# Patient Record
Sex: FEMALE | Race: WHITE | Employment: PART TIME | URBAN - METROPOLITAN AREA
[De-identification: names, ages, dates, MRNs, and addresses within clinical notes are randomized per-mention and may not be internally consistent; named-entity substitution may affect disease eponyms.]

---

## 2017-01-11 ENCOUNTER — OFFICE VISIT (OUTPATIENT)
Dept: PODIATRY | Age: 57
End: 2017-01-11

## 2017-01-11 VITALS
HEART RATE: 86 BPM | OXYGEN SATURATION: 97 % | DIASTOLIC BLOOD PRESSURE: 80 MMHG | SYSTOLIC BLOOD PRESSURE: 126 MMHG | TEMPERATURE: 97.2 F | HEIGHT: 69 IN

## 2017-01-11 DIAGNOSIS — E11.610 CHARCOT FOOT DUE TO DIABETES MELLITUS (HCC): Primary | ICD-10-CM

## 2017-01-11 PROCEDURE — 99213 OFFICE O/P EST LOW 20 MIN: CPT | Performed by: PODIATRIST

## 2017-02-22 ENCOUNTER — OFFICE VISIT (OUTPATIENT)
Dept: PODIATRY | Age: 57
End: 2017-02-22

## 2017-02-22 VITALS
SYSTOLIC BLOOD PRESSURE: 122 MMHG | RESPIRATION RATE: 14 BRPM | TEMPERATURE: 97.8 F | DIASTOLIC BLOOD PRESSURE: 86 MMHG | HEIGHT: 68 IN | HEART RATE: 77 BPM | OXYGEN SATURATION: 97 %

## 2017-02-22 DIAGNOSIS — E11.610 CHARCOT FOOT DUE TO DIABETES MELLITUS (HCC): Primary | ICD-10-CM

## 2017-02-22 PROCEDURE — 99213 OFFICE O/P EST LOW 20 MIN: CPT | Performed by: PODIATRIST

## 2017-04-05 ENCOUNTER — OFFICE VISIT (OUTPATIENT)
Dept: FAMILY MEDICINE CLINIC | Age: 57
End: 2017-04-05

## 2017-04-05 VITALS
TEMPERATURE: 96.6 F | HEART RATE: 75 BPM | SYSTOLIC BLOOD PRESSURE: 102 MMHG | WEIGHT: 257 LBS | DIASTOLIC BLOOD PRESSURE: 76 MMHG | HEIGHT: 68 IN | BODY MASS INDEX: 38.95 KG/M2 | RESPIRATION RATE: 12 BRPM

## 2017-04-05 DIAGNOSIS — Z79.4 CONTROLLED TYPE 2 DIABETES MELLITUS WITH INSULIN THERAPY (HCC): Primary | ICD-10-CM

## 2017-04-05 DIAGNOSIS — J01.00 ACUTE MAXILLARY SINUSITIS, RECURRENCE NOT SPECIFIED: ICD-10-CM

## 2017-04-05 DIAGNOSIS — H69.82 ETD (EUSTACHIAN TUBE DYSFUNCTION), LEFT: ICD-10-CM

## 2017-04-05 DIAGNOSIS — M32.9 SLE (SYSTEMIC LUPUS ERYTHEMATOSUS) (HCC): ICD-10-CM

## 2017-04-05 DIAGNOSIS — E11.9 CONTROLLED TYPE 2 DIABETES MELLITUS WITH INSULIN THERAPY (HCC): Primary | ICD-10-CM

## 2017-04-05 PROCEDURE — 99213 OFFICE O/P EST LOW 20 MIN: CPT | Performed by: FAMILY MEDICINE

## 2017-04-05 RX ORDER — SULFAMETHOXAZOLE AND TRIMETHOPRIM 800; 160 MG/1; MG/1
1 TABLET ORAL 2 TIMES DAILY
Qty: 20 TABLET | Refills: 0 | Status: SHIPPED | OUTPATIENT
Start: 2017-04-05 | End: 2017-04-15

## 2017-04-07 DIAGNOSIS — E11.9 TYPE II DIABETES MELLITUS (HCC): ICD-10-CM

## 2017-04-16 ASSESSMENT — ENCOUNTER SYMPTOMS
SINUS PRESSURE: 1
COUGH: 1
ABDOMINAL PAIN: 0
SHORTNESS OF BREATH: 0
WHEEZING: 0

## 2017-04-18 ENCOUNTER — OFFICE VISIT (OUTPATIENT)
Dept: FAMILY MEDICINE CLINIC | Age: 57
End: 2017-04-18

## 2017-04-18 VITALS
DIASTOLIC BLOOD PRESSURE: 84 MMHG | BODY MASS INDEX: 37.13 KG/M2 | RESPIRATION RATE: 12 BRPM | HEIGHT: 68 IN | HEART RATE: 75 BPM | WEIGHT: 245 LBS | SYSTOLIC BLOOD PRESSURE: 126 MMHG | TEMPERATURE: 96.7 F

## 2017-04-18 DIAGNOSIS — L01.00 IMPETIGO: Primary | ICD-10-CM

## 2017-04-18 DIAGNOSIS — H65.05 RECURRENT ACUTE SEROUS OTITIS MEDIA OF LEFT EAR: ICD-10-CM

## 2017-04-18 PROCEDURE — 99213 OFFICE O/P EST LOW 20 MIN: CPT | Performed by: FAMILY MEDICINE

## 2017-04-18 RX ORDER — CLINDAMYCIN HYDROCHLORIDE 300 MG/1
300 CAPSULE ORAL 3 TIMES DAILY
Qty: 30 CAPSULE | Refills: 0 | Status: SHIPPED | OUTPATIENT
Start: 2017-04-18 | End: 2017-08-04 | Stop reason: SDUPTHER

## 2017-04-18 RX ORDER — MUPIROCIN CALCIUM 20 MG/G
CREAM TOPICAL
Qty: 15 G | Refills: 1 | Status: SHIPPED | OUTPATIENT
Start: 2017-04-18 | End: 2017-05-18

## 2017-04-24 ENCOUNTER — OFFICE VISIT (OUTPATIENT)
Dept: PODIATRY | Age: 57
End: 2017-04-24

## 2017-04-24 VITALS
HEART RATE: 81 BPM | DIASTOLIC BLOOD PRESSURE: 82 MMHG | HEIGHT: 68 IN | OXYGEN SATURATION: 95 % | BODY MASS INDEX: 38.34 KG/M2 | SYSTOLIC BLOOD PRESSURE: 130 MMHG | WEIGHT: 253 LBS | RESPIRATION RATE: 16 BRPM

## 2017-04-24 DIAGNOSIS — G60.9 IDIOPATHIC PERIPHERAL NEUROPATHY: ICD-10-CM

## 2017-04-24 DIAGNOSIS — E11.610 CHARCOT'S ARTHROPATHY, DIABETIC (HCC): Primary | ICD-10-CM

## 2017-04-24 PROCEDURE — 99213 OFFICE O/P EST LOW 20 MIN: CPT | Performed by: PODIATRIST

## 2017-04-25 RX ORDER — GABAPENTIN 300 MG/1
CAPSULE ORAL
Qty: 90 CAPSULE | Refills: 2 | Status: SHIPPED | OUTPATIENT
Start: 2017-04-25 | End: 2017-06-21 | Stop reason: SDUPTHER

## 2017-04-30 ASSESSMENT — ENCOUNTER SYMPTOMS
RHINORRHEA: 1
ABDOMINAL PAIN: 0
BACK PAIN: 1
SINUS PRESSURE: 1
COUGH: 0
SHORTNESS OF BREATH: 0
SORE THROAT: 0

## 2017-05-10 DIAGNOSIS — E11.9 CONTROLLED TYPE 2 DIABETES MELLITUS WITH INSULIN THERAPY (HCC): ICD-10-CM

## 2017-05-10 DIAGNOSIS — D69.6 THROMBOCYTOPENIA (HCC): Primary | ICD-10-CM

## 2017-05-10 DIAGNOSIS — D70.9 NEUTROPENIA, UNSPECIFIED TYPE (HCC): ICD-10-CM

## 2017-05-10 DIAGNOSIS — Z79.4 CONTROLLED TYPE 2 DIABETES MELLITUS WITH INSULIN THERAPY (HCC): ICD-10-CM

## 2017-05-10 DIAGNOSIS — M32.9 SLE (SYSTEMIC LUPUS ERYTHEMATOSUS) (HCC): ICD-10-CM

## 2017-05-10 LAB
ALBUMIN SERPL-MCNC: 3.9 G/DL (ref 3.9–4.9)
ALP BLD-CCNC: 89 U/L (ref 40–130)
ALT SERPL-CCNC: 16 U/L (ref 0–33)
ANION GAP SERPL CALCULATED.3IONS-SCNC: 16 MEQ/L (ref 7–13)
ANISOCYTOSIS: ABNORMAL
AST SERPL-CCNC: 29 U/L (ref 0–35)
BANDED NEUTROPHILS RELATIVE PERCENT: 5 % (ref 5–11)
BASOPHILS ABSOLUTE: 0 K/UL (ref 0–0.2)
BASOPHILS RELATIVE PERCENT: 1.2 %
BILIRUB SERPL-MCNC: 0.5 MG/DL (ref 0–1.2)
BUN BLDV-MCNC: 12 MG/DL (ref 6–20)
BURR CELLS: ABNORMAL
CALCIUM SERPL-MCNC: 8.8 MG/DL (ref 8.6–10.2)
CHLORIDE BLD-SCNC: 101 MEQ/L (ref 98–107)
CO2: 23 MEQ/L (ref 22–29)
CREAT SERPL-MCNC: 0.9 MG/DL (ref 0.5–0.9)
EOSINOPHILS ABSOLUTE: 0 K/UL (ref 0–0.7)
EOSINOPHILS RELATIVE PERCENT: 2 %
GFR AFRICAN AMERICAN: >60
GFR NON-AFRICAN AMERICAN: >60
GLOBULIN: 2.9 G/DL (ref 2.3–3.5)
GLUCOSE BLD-MCNC: 155 MG/DL (ref 74–109)
HBA1C MFR BLD: 7 % (ref 4.8–5.9)
HCT VFR BLD CALC: 31.5 % (ref 37–47)
HEMOGLOBIN: 10.7 G/DL (ref 12–16)
LDL CHOLESTEROL DIRECT: 76 MG/DL (ref 0–129)
LYMPHOCYTES ABSOLUTE: 0.5 K/UL (ref 1–4.8)
LYMPHOCYTES RELATIVE PERCENT: 23 %
MCH RBC QN AUTO: 28.6 PG (ref 27–31.3)
MCHC RBC AUTO-ENTMCNC: 33.9 % (ref 33–37)
MCV RBC AUTO: 84.1 FL (ref 82–100)
MICROCYTES: ABNORMAL
MONOCYTES ABSOLUTE: 0.2 K/UL (ref 0.2–0.8)
MONOCYTES RELATIVE PERCENT: 10.5 %
NEUTROPHILS ABSOLUTE: 1.4 K/UL (ref 1.4–6.5)
NEUTROPHILS RELATIVE PERCENT: 59 %
PDW BLD-RTO: 15.8 % (ref 11.5–14.5)
PLATELET # BLD: 70 K/UL (ref 130–400)
PLATELET SLIDE REVIEW: ABNORMAL
POIKILOCYTES: ABNORMAL
POTASSIUM SERPL-SCNC: 4.1 MEQ/L (ref 3.5–5.1)
PROMYELOCYTES PERCENT: 1 %
RBC # BLD: 3.74 M/UL (ref 4.2–5.4)
SMUDGE CELLS: 3.8
SODIUM BLD-SCNC: 140 MEQ/L (ref 132–144)
TOTAL PROTEIN: 6.8 G/DL (ref 6.4–8.1)
WBC # BLD: 2.2 K/UL (ref 4.8–10.8)

## 2017-05-17 ENCOUNTER — OFFICE VISIT (OUTPATIENT)
Dept: FAMILY MEDICINE CLINIC | Age: 57
End: 2017-05-17

## 2017-05-17 VITALS
SYSTOLIC BLOOD PRESSURE: 126 MMHG | TEMPERATURE: 96.5 F | HEART RATE: 78 BPM | HEIGHT: 68 IN | RESPIRATION RATE: 16 BRPM | DIASTOLIC BLOOD PRESSURE: 82 MMHG | BODY MASS INDEX: 38.04 KG/M2 | WEIGHT: 251 LBS

## 2017-05-17 DIAGNOSIS — D70.8 OTHER NEUTROPENIA (HCC): ICD-10-CM

## 2017-05-17 DIAGNOSIS — D69.6 THROMBOCYTOPENIA, ACQUIRED (HCC): ICD-10-CM

## 2017-05-17 DIAGNOSIS — J40 SINOBRONCHITIS: ICD-10-CM

## 2017-05-17 DIAGNOSIS — J32.9 SINOBRONCHITIS: ICD-10-CM

## 2017-05-17 DIAGNOSIS — E11.9 CONTROLLED TYPE 2 DIABETES MELLITUS WITHOUT COMPLICATION, WITHOUT LONG-TERM CURRENT USE OF INSULIN (HCC): Primary | ICD-10-CM

## 2017-05-17 PROCEDURE — 99213 OFFICE O/P EST LOW 20 MIN: CPT | Performed by: FAMILY MEDICINE

## 2017-05-17 RX ORDER — AMOXICILLIN 500 MG/1
CAPSULE ORAL
Qty: 40 CAPSULE | Refills: 0 | Status: SHIPPED | OUTPATIENT
Start: 2017-05-17 | End: 2017-06-14

## 2017-05-26 DIAGNOSIS — M32.9 SLE (SYSTEMIC LUPUS ERYTHEMATOSUS) (HCC): ICD-10-CM

## 2017-05-26 DIAGNOSIS — D69.6 THROMBOCYTOPENIA (HCC): ICD-10-CM

## 2017-05-26 DIAGNOSIS — D70.9 NEUTROPENIA, UNSPECIFIED TYPE (HCC): ICD-10-CM

## 2017-05-26 LAB
BASOPHILS ABSOLUTE: 0.1 K/UL (ref 0–0.2)
BASOPHILS RELATIVE PERCENT: 2.1 %
EOSINOPHILS ABSOLUTE: 0.1 K/UL (ref 0–0.7)
EOSINOPHILS RELATIVE PERCENT: 3.1 %
HCT VFR BLD CALC: 35.6 % (ref 37–47)
HEMOGLOBIN: 11.5 G/DL (ref 12–16)
LYMPHOCYTES ABSOLUTE: 0.6 K/UL (ref 1–4.8)
LYMPHOCYTES RELATIVE PERCENT: 23.1 %
MCH RBC QN AUTO: 27.8 PG (ref 27–31.3)
MCHC RBC AUTO-ENTMCNC: 32.3 % (ref 33–37)
MCV RBC AUTO: 86.2 FL (ref 82–100)
MONOCYTES ABSOLUTE: 0.3 K/UL (ref 0.2–0.8)
MONOCYTES RELATIVE PERCENT: 12.2 %
NEUTROPHILS ABSOLUTE: 1.5 K/UL (ref 1.4–6.5)
NEUTROPHILS RELATIVE PERCENT: 59.5 %
PDW BLD-RTO: 16.2 % (ref 11.5–14.5)
PLATELET # BLD: 95 K/UL (ref 130–400)
PLATELET SLIDE REVIEW: ABNORMAL
RBC # BLD: 4.12 M/UL (ref 4.2–5.4)
WBC # BLD: 2.4 K/UL (ref 4.8–10.8)

## 2017-05-27 DIAGNOSIS — D70.8 OTHER NEUTROPENIA (HCC): ICD-10-CM

## 2017-05-27 DIAGNOSIS — D69.6 THROMBOCYTOPENIA (HCC): Primary | ICD-10-CM

## 2017-05-29 ASSESSMENT — ENCOUNTER SYMPTOMS
TROUBLE SWALLOWING: 0
BLOOD IN STOOL: 0
ABDOMINAL PAIN: 0
SORE THROAT: 1
RHINORRHEA: 1
SINUS PRESSURE: 1
BACK PAIN: 1
SHORTNESS OF BREATH: 0
VOICE CHANGE: 0

## 2017-06-14 ENCOUNTER — OFFICE VISIT (OUTPATIENT)
Dept: PODIATRY | Age: 57
End: 2017-06-14

## 2017-06-14 VITALS
RESPIRATION RATE: 14 BRPM | HEIGHT: 68 IN | HEART RATE: 77 BPM | BODY MASS INDEX: 38.49 KG/M2 | WEIGHT: 254 LBS | SYSTOLIC BLOOD PRESSURE: 132 MMHG | TEMPERATURE: 97.6 F | DIASTOLIC BLOOD PRESSURE: 80 MMHG

## 2017-06-14 DIAGNOSIS — E11.610 CHARCOT FOOT DUE TO DIABETES MELLITUS (HCC): Primary | ICD-10-CM

## 2017-06-14 DIAGNOSIS — Z98.890 POST-OPERATIVE STATE: ICD-10-CM

## 2017-06-14 PROCEDURE — 99214 OFFICE O/P EST MOD 30 MIN: CPT | Performed by: PODIATRIST

## 2017-06-21 ENCOUNTER — OFFICE VISIT (OUTPATIENT)
Dept: FAMILY MEDICINE CLINIC | Age: 57
End: 2017-06-21

## 2017-06-21 VITALS
DIASTOLIC BLOOD PRESSURE: 78 MMHG | HEART RATE: 67 BPM | SYSTOLIC BLOOD PRESSURE: 118 MMHG | TEMPERATURE: 96.7 F | HEIGHT: 68 IN | WEIGHT: 251 LBS | RESPIRATION RATE: 16 BRPM | BODY MASS INDEX: 38.04 KG/M2

## 2017-06-21 DIAGNOSIS — E11.42 DM TYPE 2 WITH DIABETIC PERIPHERAL NEUROPATHY (HCC): Primary | ICD-10-CM

## 2017-06-21 DIAGNOSIS — I10 ESSENTIAL HYPERTENSION: ICD-10-CM

## 2017-06-21 DIAGNOSIS — D70.2 OTHER DRUG-INDUCED NEUTROPENIA (HCC): ICD-10-CM

## 2017-06-21 PROCEDURE — 99213 OFFICE O/P EST LOW 20 MIN: CPT | Performed by: FAMILY MEDICINE

## 2017-06-21 RX ORDER — GABAPENTIN 300 MG/1
CAPSULE ORAL
Qty: 90 CAPSULE | Refills: 5 | Status: SHIPPED | OUTPATIENT
Start: 2017-06-21 | End: 2018-01-04 | Stop reason: SDUPTHER

## 2017-06-21 ASSESSMENT — PATIENT HEALTH QUESTIONNAIRE - PHQ9
2. FEELING DOWN, DEPRESSED OR HOPELESS: 0
SUM OF ALL RESPONSES TO PHQ QUESTIONS 1-9: 0
SUM OF ALL RESPONSES TO PHQ9 QUESTIONS 1 & 2: 0
1. LITTLE INTEREST OR PLEASURE IN DOING THINGS: 0

## 2017-07-02 ASSESSMENT — ENCOUNTER SYMPTOMS
ABDOMINAL PAIN: 0
NAUSEA: 0
DIARRHEA: 0
VOMITING: 0
BLOOD IN STOOL: 0
SHORTNESS OF BREATH: 0

## 2017-07-21 DIAGNOSIS — D70.2 OTHER DRUG-INDUCED NEUTROPENIA (HCC): ICD-10-CM

## 2017-07-21 LAB
BASOPHILS ABSOLUTE: 0 K/UL (ref 0–0.2)
BASOPHILS RELATIVE PERCENT: 1.4 %
EOSINOPHILS ABSOLUTE: 0.1 K/UL (ref 0–0.7)
EOSINOPHILS RELATIVE PERCENT: 3.7 %
HCT VFR BLD CALC: 33.3 % (ref 37–47)
HEMOGLOBIN: 11 G/DL (ref 12–16)
LYMPHOCYTES ABSOLUTE: 0.6 K/UL (ref 1–4.8)
LYMPHOCYTES RELATIVE PERCENT: 18.1 %
MCH RBC QN AUTO: 27.3 PG (ref 27–31.3)
MCHC RBC AUTO-ENTMCNC: 32.9 % (ref 33–37)
MCV RBC AUTO: 83 FL (ref 82–100)
MONOCYTES ABSOLUTE: 0.4 K/UL (ref 0.2–0.8)
MONOCYTES RELATIVE PERCENT: 12 %
NEUTROPHILS ABSOLUTE: 2.1 K/UL (ref 1.4–6.5)
NEUTROPHILS RELATIVE PERCENT: 64.8 %
PDW BLD-RTO: 16.9 % (ref 11.5–14.5)
PLATELET # BLD: 92 K/UL (ref 130–400)
PLATELET SLIDE REVIEW: ABNORMAL
RBC # BLD: 4.02 M/UL (ref 4.2–5.4)
RBC # BLD: NORMAL 10*6/UL
WBC # BLD: 3.3 K/UL (ref 4.8–10.8)

## 2017-08-02 ENCOUNTER — OFFICE VISIT (OUTPATIENT)
Dept: PODIATRY | Age: 57
End: 2017-08-02

## 2017-08-02 VITALS
SYSTOLIC BLOOD PRESSURE: 130 MMHG | DIASTOLIC BLOOD PRESSURE: 80 MMHG | HEIGHT: 68 IN | RESPIRATION RATE: 16 BRPM | HEART RATE: 77 BPM | WEIGHT: 251 LBS | BODY MASS INDEX: 38.04 KG/M2

## 2017-08-02 DIAGNOSIS — M25.474 SWELLING OF FOOT JOINT, RIGHT: Primary | ICD-10-CM

## 2017-08-02 DIAGNOSIS — E11.610 CHARCOT FOOT DUE TO DIABETES MELLITUS (HCC): ICD-10-CM

## 2017-08-02 PROCEDURE — 99214 OFFICE O/P EST MOD 30 MIN: CPT | Performed by: PODIATRIST

## 2017-08-04 ENCOUNTER — OFFICE VISIT (OUTPATIENT)
Dept: FAMILY MEDICINE CLINIC | Age: 57
End: 2017-08-04

## 2017-08-04 VITALS
RESPIRATION RATE: 14 BRPM | HEART RATE: 70 BPM | SYSTOLIC BLOOD PRESSURE: 116 MMHG | BODY MASS INDEX: 38.19 KG/M2 | WEIGHT: 252 LBS | HEIGHT: 68 IN | DIASTOLIC BLOOD PRESSURE: 80 MMHG | TEMPERATURE: 97 F

## 2017-08-04 DIAGNOSIS — D70.8 OTHER NEUTROPENIA (HCC): ICD-10-CM

## 2017-08-04 DIAGNOSIS — H65.05 RECURRENT ACUTE SEROUS OTITIS MEDIA OF LEFT EAR: ICD-10-CM

## 2017-08-04 DIAGNOSIS — L01.00 IMPETIGO: ICD-10-CM

## 2017-08-04 PROCEDURE — 99213 OFFICE O/P EST LOW 20 MIN: CPT | Performed by: FAMILY MEDICINE

## 2017-08-04 RX ORDER — CLINDAMYCIN HYDROCHLORIDE 300 MG/1
300 CAPSULE ORAL 3 TIMES DAILY
Qty: 30 CAPSULE | Refills: 0 | Status: SHIPPED | OUTPATIENT
Start: 2017-08-04 | End: 2017-08-14

## 2017-08-13 ASSESSMENT — ENCOUNTER SYMPTOMS
BLOOD IN STOOL: 0
SHORTNESS OF BREATH: 0
COUGH: 0
ABDOMINAL PAIN: 0
CHEST TIGHTNESS: 0

## 2017-08-25 ENCOUNTER — OFFICE VISIT (OUTPATIENT)
Dept: PODIATRY | Age: 57
End: 2017-08-25

## 2017-08-25 VITALS
BODY MASS INDEX: 36.73 KG/M2 | HEIGHT: 69 IN | WEIGHT: 248 LBS | RESPIRATION RATE: 14 BRPM | DIASTOLIC BLOOD PRESSURE: 78 MMHG | TEMPERATURE: 97.8 F | SYSTOLIC BLOOD PRESSURE: 118 MMHG | HEART RATE: 82 BPM

## 2017-08-25 DIAGNOSIS — M14.672 CHARCOT'S JOINT OF LEFT FOOT: ICD-10-CM

## 2017-08-25 DIAGNOSIS — M79.671 RIGHT FOOT PAIN: Primary | ICD-10-CM

## 2017-08-25 DIAGNOSIS — M14.671 CHARCOT'S JOINT OF RIGHT FOOT: ICD-10-CM

## 2017-08-25 PROCEDURE — 99213 OFFICE O/P EST LOW 20 MIN: CPT | Performed by: PODIATRIST

## 2017-08-27 DIAGNOSIS — E11.42 TYPE 2 DIABETES, CONTROLLED, WITH PERIPHERAL NEUROPATHY (HCC): ICD-10-CM

## 2017-08-27 RX ORDER — INSULIN GLARGINE 100 [IU]/ML
INJECTION, SOLUTION SUBCUTANEOUS
Qty: 5 PEN | Refills: 5 | Status: SHIPPED | OUTPATIENT
Start: 2017-08-27 | End: 2017-11-04 | Stop reason: ALTCHOICE

## 2017-09-13 ENCOUNTER — OFFICE VISIT (OUTPATIENT)
Dept: PODIATRY | Age: 57
End: 2017-09-13

## 2017-09-13 VITALS
RESPIRATION RATE: 14 BRPM | TEMPERATURE: 96.9 F | DIASTOLIC BLOOD PRESSURE: 70 MMHG | SYSTOLIC BLOOD PRESSURE: 118 MMHG | HEIGHT: 69 IN | HEART RATE: 78 BPM

## 2017-09-13 DIAGNOSIS — E11.610 CHARCOT FOOT DUE TO DIABETES MELLITUS (HCC): Primary | ICD-10-CM

## 2017-09-13 PROCEDURE — 99213 OFFICE O/P EST LOW 20 MIN: CPT | Performed by: PODIATRIST

## 2017-10-02 ENCOUNTER — OFFICE VISIT (OUTPATIENT)
Dept: FAMILY MEDICINE CLINIC | Age: 57
End: 2017-10-02

## 2017-10-02 VITALS
HEART RATE: 81 BPM | SYSTOLIC BLOOD PRESSURE: 128 MMHG | DIASTOLIC BLOOD PRESSURE: 82 MMHG | HEIGHT: 69 IN | RESPIRATION RATE: 14 BRPM | BODY MASS INDEX: 37.03 KG/M2 | WEIGHT: 250 LBS | TEMPERATURE: 96.7 F

## 2017-10-02 DIAGNOSIS — E11.9 TYPE 2 DIABETES MELLITUS WITHOUT COMPLICATION, WITH LONG-TERM CURRENT USE OF INSULIN (HCC): ICD-10-CM

## 2017-10-02 DIAGNOSIS — Z79.4 TYPE 2 DIABETES MELLITUS WITHOUT COMPLICATION, WITH LONG-TERM CURRENT USE OF INSULIN (HCC): ICD-10-CM

## 2017-10-02 DIAGNOSIS — L08.9 INFECTED SEBACEOUS CYST: ICD-10-CM

## 2017-10-02 DIAGNOSIS — Z23 NEED FOR INFLUENZA VACCINATION: ICD-10-CM

## 2017-10-02 DIAGNOSIS — L72.3 INFECTED SEBACEOUS CYST: Primary | ICD-10-CM

## 2017-10-02 DIAGNOSIS — L72.3 INFECTED SEBACEOUS CYST: ICD-10-CM

## 2017-10-02 DIAGNOSIS — L08.9 INFECTED SEBACEOUS CYST: Primary | ICD-10-CM

## 2017-10-02 PROCEDURE — 99213 OFFICE O/P EST LOW 20 MIN: CPT | Performed by: FAMILY MEDICINE

## 2017-10-02 PROCEDURE — 90471 IMMUNIZATION ADMIN: CPT | Performed by: FAMILY MEDICINE

## 2017-10-02 PROCEDURE — 90688 IIV4 VACCINE SPLT 0.5 ML IM: CPT | Performed by: FAMILY MEDICINE

## 2017-10-02 RX ORDER — SULFAMETHOXAZOLE AND TRIMETHOPRIM 800; 160 MG/1; MG/1
TABLET ORAL
Qty: 30 TABLET | Refills: 0 | Status: SHIPPED | OUTPATIENT
Start: 2017-10-02 | End: 2017-11-04 | Stop reason: ALTCHOICE

## 2017-10-02 NOTE — PROGRESS NOTES
Subjective  Remedios Najera, 65 y.o. female presents today with:  Chief Complaint   Patient presents with    Breast Mass     on right breast area that busted a few weeks ago       HPI  Patient presents today for a boil on right breast area that busted a few weeks ago. Had earlier smaller cyst which improved without rxs;now draining cyst inferior right breast;no fever/chills  Rev/rxs; No abuse nor side effects on meds   ; No cardio-pulmonary probs;compliant with diet/rxs;no new gi-gu complaints   Vaccine Information Sheet, \"Influenza - Inactivated\" OR \"Live - Intranasal\"  given to Remedios Najera. Patient responses:    Have you ever had a reaction to a flu vaccine? No  Are you able to eat eggs without adverse effects? Yes  Do you have any current illness? No  Have you ever had Guillian [place] Syndrome? No    Flu vaccine given per order. Please see immunization tab. Review of Systems   Constitutional: Negative for fatigue and fever. Respiratory: Negative for chest tightness and shortness of breath. Cardiovascular: Positive for chest pain (right breast). Negative for palpitations and leg swelling. Gastrointestinal: Negative for abdominal pain and blood in stool. Genitourinary: Negative for dysuria, flank pain and frequency. Musculoskeletal: Positive for arthralgias and back pain. Skin: Positive for rash. Neurological: Negative for weakness, light-headedness and headaches. Allergies   Allergen Reactions    Biaxin [Clarithromycin]      gi    Cephalexin      vomiting    Codeine Itching       Past Medical History:   Diagnosis Date    DJD (degenerative joint disease)     Dyslipidemia     Hypertension     Sensory neuropathy ([address]     [facility] lupus [facility]matosus [facility]     Type II diabetes mellitus ([facility] [facility]      Past Surgical History:   Procedure Laterality Date    CHOLECYSTECTOMY  2008      [id number] [facility]    RIGHT HAND    HEMORRHOID SURGERY  1982    OTHER SURGICAL HISTORY process tenderness and no muscular tenderness present. Carotid bruit is not present. No tracheal deviation present. No thyroid mass and no thyromegaly present. Cardiovascular: Normal rate, regular rhythm, S1 normal and S2 normal.   No extrasystoles are present. Exam reveals no S3 and no distant heart sounds. Pulmonary/Chest: She has no wheezes. She has no rhonchi. She has no rales. She exhibits tenderness. Right breast exhibits mass (2.5x2cm opened cyst;chaperoned/KS), skin change and tenderness. Skin: Rash (see above diagram) noted. Rash is pustular (6 o'clock on right breast). She is not diaphoretic. There is erythema. Psychiatric: Mood, memory and affect normal.          ;  Assessment & Plan   1. Infected sebaceous cyst,right breast  Wound Culture    sulfamethoxazole-trimethoprim (BACTRIM DS;SEPTRA DS) 800-160 MG per tablet   2. Need for influenza vaccination  INFLUENZA, QUADV, 3 YRS AND OLDER, IM, MDV, 0.5ML (FLUZONE QUADV)   3. Type 2 diabetes mellitus without complication, with long-term current use of insulin ([facility])  Microalbumin / creatinine urine ratio    insulin glargine (BASAGLAR KWIKPEN) 100 UNIT/ML injection pen   wound culture done;4x4 gauze applied;start bds and atif call;keli if worse  ;Continue same meds, as common side effects and use reviewed-call if ineffective or problems ;   Outpatient Encounter Prescriptions as of 10/2/2017   Medication Sig Dispense Refill    insulin glargine (BASAGLAR KWIKPEN) 100 UNIT/ML injection pen Inject 50 Units into the skin every morning (before breakfast) 5 Pen 5    metFORMIN (GLUCOPHAGE) 1000 MG tablet TAKE ONE TABLET BY MOUTH TWICE DAILY WITH MEALS 60 tablet 5    gabapentin (NEURONTIN) 300 MG capsule TAKE ONE CAPSULE BY MOUTH THREE TIMES DAILY 90 capsule 5    glipiZIDE (GLUCOTROL) 10 MG tablet TAKE ONE TABLET BY MOUTH THREE TIMES DAILY BEFORE  MEALS 90 tablet 5    leflunomide (ARAVA) 20 MG tablet       hydroxychloroquine (PLAQUENIL) 200 MG tablet

## 2017-10-02 NOTE — MR AVS SNAPSHOT
Additional Information about your Body Mass Index (BMI)           Your BMI as listed above is considered obese (30 or more). BMI is an estimate of body fat, calculated from your height and weight. The higher your BMI, the greater your risk of heart disease, high blood pressure, type 2 diabetes, stroke, gallstones, arthritis, sleep apnea, and certain cancers. BMI is not perfect. It may overestimate body fat in athletes and people who are more muscular. Even a small weight loss (between 5 and 10 percent of your current weight) by decreasing your calorie intake and becoming more physically active will help lower your risk of developing or worsening diseases associated with obesity. Learn more at: [email or web address]             Today's Medication Changes          These changes are accurate as of: 10/2/17 10:21 AM.  If you have any questions, ask your nurse or doctor. START taking these medications           sulfamethoxazole-trimethoprim 800-160 MG per tablet   Commonly known as:  BACTRIM DS;SEPTRA DS   Instructions:   Take 2 tabs stat, then 1 bid   Quantity:  30 tablet   Refills:  0   Started by:  Aicha Novak, DO            Where to Get Your Medications      These medications were sent to [address] [place] [address] [phone]  [address] [place] [address]     Phone:  [phone]     sulfamethoxazole-trimethoprim 800-160 MG per tablet               Your Current Medications Are              sulfamethoxazole-trimethoprim (BACTRIM DS;SEPTRA DS) 800-160 MG per tablet Take 2 tabs stat, then 1 bid    metFORMIN (GLUCOPHAGE) 1000 MG tablet TAKE ONE TABLET BY MOUTH TWICE DAILY WITH MEALS    gabapentin (NEURONTIN) 300 MG capsule TAKE ONE CAPSULE BY MOUTH THREE TIMES DAILY    glipiZIDE (GLUCOTROL) 10 MG tablet TAKE ONE TABLET BY MOUTH THREE TIMES DAILY BEFORE  MEALS

## 2017-10-03 LAB
CREATININE URINE: 52 MG/DL
MICROALBUMIN UR-MCNC: 1.8 MG/DL
MICROALBUMIN/CREAT UR-RTO: 34.6 MG/G (ref 0–30)

## 2017-10-04 ENCOUNTER — TELEPHONE (OUTPATIENT)
Dept: FAMILY MEDICINE CLINIC | Age: 57
End: 2017-10-04

## 2017-10-05 DIAGNOSIS — N61.0 CELLULITIS OF RIGHT BREAST: Primary | ICD-10-CM

## 2017-10-05 LAB
GRAM STAIN RESULT: ABNORMAL
ORGANISM: ABNORMAL
ORGANISM: ABNORMAL
WOUND/ABSCESS: ABNORMAL

## 2017-10-05 RX ORDER — MUPIROCIN CALCIUM 20 MG/G
CREAM TOPICAL
Qty: 15 G | Refills: 1 | Status: SHIPPED | OUTPATIENT
Start: 2017-10-05 | End: 2017-11-04

## 2017-10-06 ENCOUNTER — TELEPHONE (OUTPATIENT)
Dept: FAMILY MEDICINE CLINIC | Age: 57
End: 2017-10-06

## 2017-10-09 ASSESSMENT — ENCOUNTER SYMPTOMS
BLOOD IN STOOL: 0
BACK PAIN: 1
CHEST TIGHTNESS: 0
ABDOMINAL PAIN: 0
SHORTNESS OF BREATH: 0

## 2017-10-17 DIAGNOSIS — N61.1 ABSCESS OF RIGHT BREAST: Primary | ICD-10-CM

## 2017-10-17 RX ORDER — AMOXICILLIN AND CLAVULANATE POTASSIUM 875; 125 MG/1; MG/1
1 TABLET, FILM COATED ORAL 2 TIMES DAILY
Qty: 20 TABLET | Refills: 0 | Status: SHIPPED | OUTPATIENT
Start: 2017-10-17 | End: 2018-03-15 | Stop reason: SDUPTHER

## 2017-10-27 DIAGNOSIS — D70.8 OTHER NEUTROPENIA (HCC): ICD-10-CM

## 2017-10-27 LAB
ANION GAP SERPL CALCULATED.3IONS-SCNC: 15 MEQ/L (ref 7–13)
BASOPHILS ABSOLUTE: 0 K/UL (ref 0–0.2)
BASOPHILS RELATIVE PERCENT: 1.7 %
BUN BLDV-MCNC: 11 MG/DL (ref 6–20)
CALCIUM SERPL-MCNC: 9.2 MG/DL (ref 8.6–10.2)
CHLORIDE BLD-SCNC: 100 MEQ/L (ref 98–107)
CO2: 25 MEQ/L (ref 22–29)
CREAT SERPL-MCNC: 0.76 MG/DL (ref 0.5–0.9)
EOSINOPHILS ABSOLUTE: 0 K/UL (ref 0–0.7)
EOSINOPHILS RELATIVE PERCENT: 2.2 %
GFR AFRICAN AMERICAN: >60
GFR NON-AFRICAN AMERICAN: >60
GLUCOSE BLD-MCNC: 134 MG/DL (ref 74–109)
HBA1C MFR BLD: 7.2 % (ref 4.8–5.9)
HCT VFR BLD CALC: 34.2 % (ref 37–47)
HEMOGLOBIN: 11.4 G/DL (ref 12–16)
LYMPHOCYTES ABSOLUTE: 0.7 K/UL (ref 1–4.8)
LYMPHOCYTES RELATIVE PERCENT: 30.8 %
MCH RBC QN AUTO: 27.3 PG (ref 27–31.3)
MCHC RBC AUTO-ENTMCNC: 33.4 % (ref 33–37)
MCV RBC AUTO: 81.8 FL (ref 82–100)
MONOCYTES ABSOLUTE: 0.2 K/UL (ref 0.2–0.8)
MONOCYTES RELATIVE PERCENT: 10.6 %
NEUTROPHILS ABSOLUTE: 1.2 K/UL (ref 1.4–6.5)
NEUTROPHILS RELATIVE PERCENT: 54.7 %
PDW BLD-RTO: 17.5 % (ref 11.5–14.5)
PLATELET # BLD: 56 K/UL (ref 130–400)
PLATELET SLIDE REVIEW: ABNORMAL
POTASSIUM SERPL-SCNC: 4.4 MEQ/L (ref 3.5–5.1)
RBC # BLD: 4.18 M/UL (ref 4.2–5.4)
SLIDE REVIEW: ABNORMAL
SODIUM BLD-SCNC: 140 MEQ/L (ref 132–144)
WBC # BLD: 2.2 K/UL (ref 4.8–10.8)

## 2017-11-04 ENCOUNTER — OFFICE VISIT (OUTPATIENT)
Dept: FAMILY MEDICINE CLINIC | Age: 57
End: 2017-11-04

## 2017-11-04 VITALS
WEIGHT: 245 LBS | SYSTOLIC BLOOD PRESSURE: 128 MMHG | DIASTOLIC BLOOD PRESSURE: 86 MMHG | RESPIRATION RATE: 14 BRPM | HEART RATE: 69 BPM | TEMPERATURE: 97.1 F | HEIGHT: 69 IN | BODY MASS INDEX: 36.29 KG/M2

## 2017-11-04 DIAGNOSIS — M32.8 OTHER FORMS OF SYSTEMIC LUPUS ERYTHEMATOSUS, UNSPECIFIED ORGAN INVOLVEMENT STATUS (HCC): ICD-10-CM

## 2017-11-04 DIAGNOSIS — D69.59 DRUG-INDUCED IMMUNE THROMBOCYTOPENIA: ICD-10-CM

## 2017-11-04 DIAGNOSIS — T50.905A DRUG-INDUCED IMMUNE THROMBOCYTOPENIA: ICD-10-CM

## 2017-11-04 DIAGNOSIS — D70.2 DRUG-INDUCED LEUKOPENIA (HCC): Primary | ICD-10-CM

## 2017-11-04 PROCEDURE — G8484 FLU IMMUNIZE NO ADMIN: HCPCS | Performed by: FAMILY MEDICINE

## 2017-11-04 PROCEDURE — G8417 CALC BMI ABV UP PARAM F/U: HCPCS | Performed by: FAMILY MEDICINE

## 2017-11-04 PROCEDURE — 3014F SCREEN MAMMO DOC REV: CPT | Performed by: FAMILY MEDICINE

## 2017-11-04 PROCEDURE — 99213 OFFICE O/P EST LOW 20 MIN: CPT | Performed by: FAMILY MEDICINE

## 2017-11-04 PROCEDURE — G8427 DOCREV CUR MEDS BY ELIG CLIN: HCPCS | Performed by: FAMILY MEDICINE

## 2017-11-04 PROCEDURE — 1036F TOBACCO NON-USER: CPT | Performed by: FAMILY MEDICINE

## 2017-11-04 PROCEDURE — 3017F COLORECTAL CA SCREEN DOC REV: CPT | Performed by: FAMILY MEDICINE

## 2017-11-04 PROCEDURE — 3045F PR MOST RECENT HEMOGLOBIN A1C LEVEL 7.0-9.0%: CPT | Performed by: FAMILY MEDICINE

## 2017-11-04 NOTE — PROGRESS NOTES
Subjective  Madi Santiago, 65 y.o. female presents today with:  Chief Complaint   Patient presents with    Cyst     3 mnth f/u     Diabetes       HPI  Patient presents today for a three month follow up for cyst and diabetes. Pt aware of lower wbc and rheum d/c the Arava[still on plaquenil]  Joints--stable and NO rash/bleeding  Am sugars/100-145  ; No cardio-pulmonary probs;compliant with diet/rxs;no new gi-gu complaints   Review of Systems   Constitutional: Positive for fatigue. Negative for fever and unexpected weight change. Eyes: Negative for visual disturbance. Respiratory: Negative for shortness of breath and wheezing. Cardiovascular: Negative for chest pain, palpitations and leg swelling. Gastrointestinal: Negative for abdominal pain and blood in stool. Genitourinary: Negative for dysuria, flank pain and frequency. Musculoskeletal: Positive for arthralgias and myalgias. Negative for gait problem. Skin: Negative for rash. Neurological: Positive for numbness (feet). Negative for weakness and light-headedness. Psychiatric/Behavioral: Negative for confusion. right lower chest cyst doing well after bds/augmentin    Allergies   Allergen Reactions    Biaxin [Clarithromycin]      gi    Codeine Itching    Cephalexin Nausea And Vomiting       Past Medical History:   Diagnosis Date    DJD (degenerative joint disease)     Dyslipidemia     Hypertension     Sensory neuropathy ([address]     Systemic lupus erythematosus ([address]     Type II diabetes mellitus ([address]      Past Surgical History:   Procedure Laterality Date    CHOLECYSTECTOMY  2008    DR. TAVERA    CYST REMOVAL  1995    RIGHT HAND    HEMORRHOID SURGERY  1982    OTHER SURGICAL HISTORY Left 09/01/2016    CARMELITA WEBBER DPM  ARTHRODESIS TALONAVICULAR NAVICULAR CUNEIFORM 1ST METATARSAL CUNEIFORM BONE GRAFT    TUBAL LIGATION  1988    TUMOR REMOVAL  1984    LEFT LEG     Social History     Social History    Marital status:      Spouse Musculoskeletal: She exhibits no edema. Left knee: Tenderness found. Medial joint line (tr/4 tx) tenderness noted. Neurological: A sensory deficit (reduced touch/vib in feet x2) is present. Skin: Rash noted. Rash is nodular (cyst .wo redness/drainage). She is not diaphoretic. Psychiatric: Mood, memory and affect normal.     Orders Only on 10/27/2017   Component Date Value Ref Range Status    Hemoglobin A1C 10/27/2017 7.2* 4.8 - 5.9 % Final    Sodium 10/27/2017 140  132 - 144 mEq/L Final    Potassium 10/27/2017 4.4  3.5 - 5.1 mEq/L Final    Chloride 10/27/2017 100  98 - 107 mEq/L Final    CO2 10/27/2017 25  22 - 29 mEq/L Final    Anion Gap 10/27/2017 15* 7 - 13 mEq/L Final    Glucose 10/27/2017 134* 74 - 109 mg/dL Final    BUN 10/27/2017 11  6 - 20 mg/dL Final    CREATININE 10/27/2017 0.76  0.50 - 0.90 mg/dL Final    GFR Non- 10/27/2017 >60.0  >60 Final    Comment: >60 mL/min/1.73m2 EGFR, calc. for ages 25 and older using the  MDRD formula (not corrected for weight), is valid for stable  renal function.  GFR  10/27/2017 >60.0  >60 Final    Comment: >60 mL/min/1.73m2 EGFR, calc. for ages 25 and older using the  MDRD formula (not corrected for weight), is valid for stable  renal function.       Calcium 10/27/2017 9.2  8.6 - 10.2 mg/dL Final    WBC 10/27/2017 2.2* 4.8 - 10.8 K/uL Final    RBC 10/27/2017 4.18* 4.20 - 5.40 M/uL Final    Hemoglobin 10/27/2017 11.4* 12.0 - 16.0 g/dL Final    Hematocrit 10/27/2017 34.2* 37.0 - 47.0 % Final    MCV 10/27/2017 81.8* 82.0 - 100.0 fL Final    MCH 10/27/2017 27.3  27.0 - 31.3 pg Final    MCHC 10/27/2017 33.4  33.0 - 37.0 % Final    RDW 10/27/2017 17.5* 11.5 - 14.5 % Final    Platelets 44/81/0650 56* 130 - 400 K/uL Final    PLATELET SLIDE REVIEW 10/27/2017 Decreased   Final    SLIDE REVIEW 10/27/2017 see below   Final    Neutrophils % 10/27/2017 54.7  % Final    Lymphocytes % 10/27/2017 30.8  % Final  Monocytes % 10/27/2017 10.6  % Final    Eosinophils % 10/27/2017 2.2  % Final    Basophils % 10/27/2017 1.7  % Final    Neutrophils # 10/27/2017 1.2* 1.4 - 6.5 K/uL Final    Lymphocytes # 10/27/2017 0.7* 1.0 - 4.8 K/uL Final    Monocytes # 10/27/2017 0.2  0.2 - 0.8 K/uL Final    Eosinophils # 10/27/2017 0.0  0.0 - 0.7 K/uL Final    Basophils # 10/27/2017 0.0  0.0 - 0.2 K/uL Final     WOUND/ABSCESS 10/02/2017  9:37 PM [address] [facility]   Gram Stain Result  (Abnormal) 10/02/2017  9:37 PM [address] [facility]    (A)   Rare WBC's   No epithelial cells   Moderate Gram positive cocci in clusters-resembling Staph     Organism  (Abnormal) 10/02/2017  9:37 PM [address] [facility]   BHS Group B (Strep agalacticae)     WOUND/ABSCESS 10/02/2017  9:37 PM [address] [facility]   Moderate growth   No further workup   Susceptibility testing of penicillin and other beta-lactams is   not necessary for beta hemolytic Streptococci since resistant   strains have not been identified. (CLSI [id number], 2017)     Organism  (Abnormal) 10/02/2017  9:37 PM [address] [facility]   Staphylococcus coagulase-negative     WOUND/ABSCESS 10/02/2017  9:37 PM [address] [facility]   Heavy growth   No further workup     Testing Performed By     Lab - Abbreviation   ;rev lab  Assessment & Plan   1. Drug-induced leukopenia (HCC)  CBC Auto Differential   2. Other forms of systemic lupus erythematosus, unspecified organ involvement status (HCC)  C-Reactive Protein   3. Uncontrolled type 2 diabetes mellitus with diabetic cataract, without long-term current use of insulin ([facility]     4.  Drug-induced immune thrombocytopenia     f/u cbc in 4-6 wks OFF arava  Up pm lantus to 40-50  Orders Placed This Encounter   Procedures    CBC Auto Differential     Standing Status:   Future     Standing Expiration Date:   11/4/2018    C-Reactive Protein     Standing Status:   Future     Standing Expiration Date:   11/4/2018     No orders of the defined types were placed in this encounter. Medications Discontinued During This Encounter   Medication Reason    sulfamethoxazole-trimethoprim (BACTRIM DS;SEPTRA DS) 800-160 MG per tablet Therapy completed    leflunomide (ARAVA) 20 MG tablet Therapy completed    LANTUS SOLOSTAR 100 UNIT/ML injection pen Therapy completed     Return in about 2 months (around 2018) for dm. ;See above orders, as use and side effects reviewed ; Outpatient Encounter Prescriptions as of 2017   Medication Sig Dispense Refill    [] mupirocin (BACTROBAN) 2 % cream Apply topically 3 times daily. 15 g 1    insulin glargine (BASAGLAR KWIKPEN) 100 UNIT/ML injection pen Inject 50 Units into the skin every morning (before breakfast) 5 Pen 5    metFORMIN (GLUCOPHAGE) 1000 MG tablet TAKE ONE TABLET BY MOUTH TWICE DAILY WITH MEALS 60 tablet 5    gabapentin (NEURONTIN) 300 MG capsule TAKE ONE CAPSULE BY MOUTH THREE TIMES DAILY 90 capsule 5    [DISCONTINUED] glipiZIDE (GLUCOTROL) 10 MG tablet TAKE ONE TABLET BY MOUTH THREE TIMES DAILY BEFORE  MEALS 90 tablet 5    hydroxychloroquine (PLAQUENIL) 200 MG tablet       Cholecalciferol (VITAMIN D3) 47142 UNITS CAPS Take 1 capsule by mouth once a week      [DISCONTINUED] sulfamethoxazole-trimethoprim (BACTRIM DS;SEPTRA DS) 800-160 MG per tablet Take 2 tabs stat, then 1 bid 30 tablet 0    [DISCONTINUED] LANTUS SOLOSTAR 100 UNIT/ML injection pen INJECT 38 UNITS SUBCUTANEOUSLY ONCE DAILY 5 Pen 5    [DISCONTINUED] leflunomide (ARAVA) 20 MG tablet        No facility-administered encounter medications on file as of 2017. Call or return to clinic prn if these symptoms worsen or fail to improve as anticipated.       Janice Jang,

## 2017-11-09 DIAGNOSIS — E11.9 TYPE II DIABETES MELLITUS (HCC): ICD-10-CM

## 2017-11-09 RX ORDER — GLIPIZIDE 10 MG/1
TABLET ORAL
Qty: 90 TABLET | Refills: 5 | Status: SHIPPED | OUTPATIENT
Start: 2017-11-09 | End: 2018-03-12 | Stop reason: SDUPTHER

## 2017-11-12 ASSESSMENT — ENCOUNTER SYMPTOMS
BLOOD IN STOOL: 0
WHEEZING: 0
ABDOMINAL PAIN: 0
SHORTNESS OF BREATH: 0

## 2017-11-20 ENCOUNTER — OFFICE VISIT (OUTPATIENT)
Dept: PODIATRY | Age: 57
End: 2017-11-20

## 2017-11-20 VITALS
WEIGHT: 250 LBS | OXYGEN SATURATION: 97 % | SYSTOLIC BLOOD PRESSURE: 120 MMHG | HEART RATE: 74 BPM | TEMPERATURE: 97.4 F | HEIGHT: 69 IN | RESPIRATION RATE: 14 BRPM | BODY MASS INDEX: 37.03 KG/M2 | DIASTOLIC BLOOD PRESSURE: 78 MMHG

## 2017-11-20 DIAGNOSIS — E11.610 CHARCOT FOOT DUE TO DIABETES MELLITUS (HCC): Primary | ICD-10-CM

## 2017-11-20 PROCEDURE — 3045F PR MOST RECENT HEMOGLOBIN A1C LEVEL 7.0-9.0%: CPT | Performed by: PODIATRIST

## 2017-11-20 PROCEDURE — G8427 DOCREV CUR MEDS BY ELIG CLIN: HCPCS | Performed by: PODIATRIST

## 2017-11-20 PROCEDURE — 1036F TOBACCO NON-USER: CPT | Performed by: PODIATRIST

## 2017-11-20 PROCEDURE — G8417 CALC BMI ABV UP PARAM F/U: HCPCS | Performed by: PODIATRIST

## 2017-11-20 PROCEDURE — 3017F COLORECTAL CA SCREEN DOC REV: CPT | Performed by: PODIATRIST

## 2017-11-20 PROCEDURE — 99213 OFFICE O/P EST LOW 20 MIN: CPT | Performed by: PODIATRIST

## 2017-11-20 PROCEDURE — G8484 FLU IMMUNIZE NO ADMIN: HCPCS | Performed by: PODIATRIST

## 2017-11-20 PROCEDURE — 3014F SCREEN MAMMO DOC REV: CPT | Performed by: PODIATRIST

## 2017-11-24 NOTE — PROGRESS NOTES
Statement of Resident/Student Supervision:  The resident/student was under my direct supervision during today's patient encounter. I am in agreement with the medical chart documentation, as this is a reflection of my personal examination, assessment and treatment plan. Additionally, I was present with the resident/student during the entire history and exam and documented the patient's assessment and treatment plan. I discussed the management with the resident. Ryan Bowman

## 2017-12-15 DIAGNOSIS — M32.8 OTHER FORMS OF SYSTEMIC LUPUS ERYTHEMATOSUS, UNSPECIFIED ORGAN INVOLVEMENT STATUS (HCC): ICD-10-CM

## 2017-12-15 DIAGNOSIS — D70.2 DRUG-INDUCED LEUKOPENIA (HCC): ICD-10-CM

## 2017-12-15 LAB
BASOPHILS ABSOLUTE: 0 K/UL (ref 0–0.2)
BASOPHILS RELATIVE PERCENT: 1 %
C-REACTIVE PROTEIN: 2 MG/L (ref 0–5)
EOSINOPHILS ABSOLUTE: 0.1 K/UL (ref 0–0.7)
EOSINOPHILS RELATIVE PERCENT: 2.7 %
HCT VFR BLD CALC: 35.8 % (ref 37–47)
HEMOGLOBIN: 11.8 G/DL (ref 12–16)
LYMPHOCYTES ABSOLUTE: 0.7 K/UL (ref 1–4.8)
LYMPHOCYTES RELATIVE PERCENT: 28.8 %
MCH RBC QN AUTO: 28.5 PG (ref 27–31.3)
MCHC RBC AUTO-ENTMCNC: 33 % (ref 33–37)
MCV RBC AUTO: 86.2 FL (ref 82–100)
MONOCYTES ABSOLUTE: 0.2 K/UL (ref 0.2–0.8)
MONOCYTES RELATIVE PERCENT: 9.6 %
NEUTROPHILS ABSOLUTE: 1.4 K/UL (ref 1.4–6.5)
NEUTROPHILS RELATIVE PERCENT: 57.9 %
PDW BLD-RTO: 16.9 % (ref 11.5–14.5)
PLATELET # BLD: 79 K/UL (ref 130–400)
PLATELET SLIDE REVIEW: ABNORMAL
RBC # BLD: 4.16 M/UL (ref 4.2–5.4)
RBC # BLD: NORMAL 10*6/UL
WBC # BLD: 2.5 K/UL (ref 4.8–10.8)

## 2018-01-04 ENCOUNTER — OFFICE VISIT (OUTPATIENT)
Dept: FAMILY MEDICINE CLINIC | Age: 58
End: 2018-01-04

## 2018-01-04 VITALS
HEIGHT: 69 IN | DIASTOLIC BLOOD PRESSURE: 84 MMHG | WEIGHT: 269 LBS | SYSTOLIC BLOOD PRESSURE: 116 MMHG | TEMPERATURE: 97.2 F | RESPIRATION RATE: 16 BRPM | BODY MASS INDEX: 39.84 KG/M2 | HEART RATE: 72 BPM

## 2018-01-04 DIAGNOSIS — B96.89 ACUTE BACTERIAL SINUSITIS: Primary | ICD-10-CM

## 2018-01-04 DIAGNOSIS — J01.90 ACUTE BACTERIAL SINUSITIS: Primary | ICD-10-CM

## 2018-01-04 DIAGNOSIS — Z23 NEED FOR PROPHYLACTIC VACCINATION AGAINST STREPTOCOCCUS PNEUMONIAE (PNEUMOCOCCUS): ICD-10-CM

## 2018-01-04 DIAGNOSIS — E11.42 DM TYPE 2 WITH DIABETIC PERIPHERAL NEUROPATHY (HCC): ICD-10-CM

## 2018-01-04 DIAGNOSIS — I10 ESSENTIAL HYPERTENSION: ICD-10-CM

## 2018-01-04 DIAGNOSIS — D70.2 DRUG-INDUCED LEUKOPENIA (HCC): ICD-10-CM

## 2018-01-04 PROCEDURE — 3046F HEMOGLOBIN A1C LEVEL >9.0%: CPT | Performed by: FAMILY MEDICINE

## 2018-01-04 PROCEDURE — G8417 CALC BMI ABV UP PARAM F/U: HCPCS | Performed by: FAMILY MEDICINE

## 2018-01-04 PROCEDURE — G8484 FLU IMMUNIZE NO ADMIN: HCPCS | Performed by: FAMILY MEDICINE

## 2018-01-04 PROCEDURE — 3017F COLORECTAL CA SCREEN DOC REV: CPT | Performed by: FAMILY MEDICINE

## 2018-01-04 PROCEDURE — 99213 OFFICE O/P EST LOW 20 MIN: CPT | Performed by: FAMILY MEDICINE

## 2018-01-04 PROCEDURE — 3014F SCREEN MAMMO DOC REV: CPT | Performed by: FAMILY MEDICINE

## 2018-01-04 PROCEDURE — G8427 DOCREV CUR MEDS BY ELIG CLIN: HCPCS | Performed by: FAMILY MEDICINE

## 2018-01-04 PROCEDURE — 90471 IMMUNIZATION ADMIN: CPT | Performed by: FAMILY MEDICINE

## 2018-01-04 PROCEDURE — 90670 PCV13 VACCINE IM: CPT | Performed by: FAMILY MEDICINE

## 2018-01-04 PROCEDURE — 1036F TOBACCO NON-USER: CPT | Performed by: FAMILY MEDICINE

## 2018-01-04 RX ORDER — AMOXICILLIN 500 MG/1
CAPSULE ORAL
Qty: 40 CAPSULE | Refills: 0 | Status: SHIPPED | OUTPATIENT
Start: 2018-01-04 | End: 2018-01-24

## 2018-01-04 RX ORDER — GABAPENTIN 300 MG/1
CAPSULE ORAL
Qty: 90 CAPSULE | Refills: 5 | Status: SHIPPED | OUTPATIENT
Start: 2018-01-04 | End: 2018-05-29 | Stop reason: SDUPTHER

## 2018-01-04 NOTE — PROGRESS NOTES
Subjective  Creta Proud, 65 y.o. female presents today with:  Chief Complaint   Patient presents with    Diabetes     2 mnth f/u        HPI  Patient presents today for a two month follow up for diabetes. Marianna Hollingsworth f/u left Charcot's joint-left ankle/foot  Dr. Wesly Santiago; pm--on qhs basaglar qhs    OFF arava w/ leukopenia--no rash and sle-stable on new rxs  Rev/rxs; No abuse nor side effects on meds   ; No cardio-pulmonary probs;compliant with /rxs;no new gi-gu complaints   Review of Systems   Constitutional: Negative for fatigue, fever and unexpected weight change. HENT: Positive for congestion, sinus pain, sinus pressure and sore throat. Negative for mouth sores, tinnitus, trouble swallowing and voice change. Eyes: Negative for visual disturbance. Respiratory: Negative for cough and shortness of breath. Cardiovascular: Negative for chest pain. Gastrointestinal: Negative for abdominal pain and blood in stool. Genitourinary: Positive for frequency. Negative for dysuria and flank pain. Musculoskeletal: Positive for arthralgias, gait problem, joint swelling and myalgias. Skin: Negative for rash. Neurological: Positive for weakness (left ankle-foot) and numbness. Negative for light-headedness and headaches. Hematological: Does not bruise/bleed easily. Psychiatric/Behavioral: Negative for dysphoric mood. Allergies   Allergen Reactions    Biaxin [Clarithromycin]      gi    Codeine Itching    Cephalexin Nausea And Vomiting       Past Medical History:   Diagnosis Date    DJD (degenerative joint disease)     Dyslipidemia     Hypertension     Sensory neuropathy ([address]     Systemic lupus erythematosus ([address]     Type II diabetes mellitus ([address]      Past Surgical History:   Procedure Laterality Date    CHOLECYSTECTOMY  2008    DR. TAVERA    CYST REMOVAL  1995    RIGHT HAND    HEMORRHOID SURGERY  1982    OTHER SURGICAL HISTORY Left 09/01/2016    Mimi Zavaleta DPCARMELITA  ARTHRODESIS Left Ear: Ear canal normal. No tenderness. Tympanic membrane is retracted. Tympanic membrane is not injected. A middle ear effusion is present. Nose: Mucosal edema and rhinorrhea present. Left sinus exhibits maxillary sinus tenderness. Mouth/Throat: No oral lesions. Posterior oropharyngeal erythema present. No oropharyngeal exudate. Eyes: No scleral icterus. Neck: Normal range of motion. Neck supple. Carotid bruit is not present. No neck rigidity. No thyroid mass and no thyromegaly present. Cardiovascular: Normal rate, regular rhythm, S1 normal, S2 normal and normal heart sounds. No extrasystoles are present. No murmur heard. Pulmonary/Chest: Effort normal and breath sounds normal.   Musculoskeletal: She exhibits no edema. Neurological: She is alert and oriented to person, place, and time. She has intact cranial nerves. A sensory deficit (red vib in both feet) is present. Gait abnormal.   Skin: She is not diaphoretic.    Psychiatric: Mood and affect normal.     Orders Only on 12/15/2017   Component Date Value Ref Range Status    WBC 12/15/2017 2.5* 4.8 - 10.8 K/uL Final    RBC 12/15/2017 4.16* 4.20 - 5.40 M/uL Final    Hemoglobin 12/15/2017 11.8* 12.0 - 16.0 g/dL Final    Hematocrit 12/15/2017 35.8* 37.0 - 47.0 % Final    MCV 12/15/2017 86.2  82.0 - 100.0 fL Final    MCH 12/15/2017 28.5  27.0 - 31.3 pg Final    MCHC 12/15/2017 33.0  33.0 - 37.0 % Final    RDW 12/15/2017 16.9* 11.5 - 14.5 % Final    Platelets 55/46/3329 79* 130 - 400 K/uL Final    PLATELET SLIDE REVIEW 12/15/2017 Decreased   Final    Neutrophils % 12/15/2017 57.9  % Final    Lymphocytes % 12/15/2017 28.8  % Final    Monocytes % 12/15/2017 9.6  % Final    Eosinophils % 12/15/2017 2.7  % Final    Basophils % 12/15/2017 1.0  % Final    Neutrophils # 12/15/2017 1.4  1.4 - 6.5 K/uL Final    Lymphocytes # 12/15/2017 0.7* 1.0 - 4.8 K/uL Final    Monocytes # 12/15/2017 0.2  0.2 - 0.8 K/uL Final    Eosinophils # 12/15/2017 0.1  0.0 - 0.7 K/uL Final    Basophils # 12/15/2017 0.0  0.0 - 0.2 K/uL Final    RBC Morphology 12/15/2017 Normal   Final    CRP 12/15/2017 2.0  0.0 - 5.0 mg/L Final       10/27/2017  3:11 PM - Kem[facility], [facility] Incoming Lab Results From Soft     Component Results     Component Value Ref Range & Units Status Collected Lab   WBC 2.2   4.8 - 10.8 K/uL Final 10/27/2017 10:30 AM [facility]   RBC 4.18   4.20 - 5.40 M/uL Final 10/27/2017 10:30 AM [facility]   Hemoglobin 11.4   12.0 - 16.0 g/dL Final 10/27/2017 10:30 AM  - [facility]   Hematocrit 34.2   37.0 - 47.0 % Final 10/27/2017 10:30 AM  - [facility]   MCV 81.8   82.0 - 100.0 fL Final 10/27/2017 10:30 AM  - [facility]   MCH 27.3  27.0 - 31.3 pg Final 10/27/2017 10:30 AM  - [facility]   MCHC 33.4  33.0 - 37.0 % Final 10/27/2017 10:30 AM  - [facility]   RDW 17.5   11.5 - 14.5 % Final 10/27/2017 10:30 AM  - [facility]   Platelets 56   508 - 400 K/uL Final 10/27/2017 10:30 AM [address] [facility]   PLATELET SLIDE REVIEW Decreased   Final 10/27/2017 10:30 AM [address] REVIEW see below   Final 10/27/2017 10:30 AM [facility]   Slide review agrees with reported results   Neutrophils % 54.7  % Final 10/27/2017 10:30 AM [address]   Lymphocytes % 30.8  % Final 10/27/2017 10:30 AM [facility]   Monocytes % 10.6  % Final 10/27/2017 10:30 AM [facility]   Eosinophils % 2.2        10/27/2017  2:03 PM - Kem[facility], [facility] Incoming Lab Results From [facility]     Component Results     Component Value Ref Range & Units Status Collected Lab   Sodium 140  132 - 144 mEq/L Final 10/27/2017 10:30 AM [facility]   Potassium 4.4  3.5 - 5.1 mEq/L Final 10/27/2017 10:30 AM [facility]   Chloride 100  98 - 107 mEq/L Final 10/27/2017 10:30 AM [facility]   CO2 25  22 - 29 mEq/L Final 10/27/2017 10:30 AM Sig: TAKE ONE CAPSULE BY MOUTH THREE TIMES DAILY. Dispense:  90 capsule     Refill:  5    amoxicillin (AMOXIL) 500 MG capsule     Sig: Take 2 caps bid     Dispense:  40 capsule     Refill:  0     tolerates     Medications Discontinued During This Encounter   Medication Reason    gabapentin (NEURONTIN) 300 MG capsule Reorder     Return in about 9 weeks (around 3/8/2018). ;  Outpatient Encounter Prescriptions as of 1/4/2018   Medication Sig Dispense Refill    gabapentin (NEURONTIN) 300 MG capsule TAKE ONE CAPSULE BY MOUTH THREE TIMES DAILY. 90 capsule 5    glipiZIDE (GLUCOTROL) 10 MG tablet TAKE ONE TABLET BY MOUTH THREE TIMES DAILY BEFORE MEAL(S) 90 tablet 5    insulin glargine (BASAGLAR KWIKPEN) 100 UNIT/ML injection pen Inject 50 Units into the skin every morning (before breakfast) 5 Pen 5    metFORMIN (GLUCOPHAGE) 1000 MG tablet TAKE ONE TABLET BY MOUTH TWICE DAILY WITH MEALS 60 tablet 5    hydroxychloroquine (PLAQUENIL) 200 MG tablet       Cholecalciferol (VITAMIN D3) 40493 UNITS CAPS Take 1 capsule by mouth once a week      amoxicillin (AMOXIL) 500 MG capsule Take 2 caps bid 40 capsule 0    [DISCONTINUED] gabapentin (NEURONTIN) 300 MG capsule TAKE ONE CAPSULE BY MOUTH THREE TIMES DAILY 90 capsule 5     No facility-administered encounter medications on file as of 1/4/2018. call if worse  F/u podiatry  Call or return to clinic prn if these symptoms worsen or fail to improve as anticipated.       Surya Correia,

## 2018-01-11 ASSESSMENT — ENCOUNTER SYMPTOMS
BLOOD IN STOOL: 0
SINUS PAIN: 1
SHORTNESS OF BREATH: 0
ABDOMINAL PAIN: 0
VOICE CHANGE: 0
SINUS PRESSURE: 1
TROUBLE SWALLOWING: 0
SORE THROAT: 1
COUGH: 0

## 2018-01-24 ENCOUNTER — OFFICE VISIT (OUTPATIENT)
Dept: PODIATRY | Age: 58
End: 2018-01-24
Payer: COMMERCIAL

## 2018-01-24 VITALS
HEIGHT: 68 IN | OXYGEN SATURATION: 98 % | WEIGHT: 260 LBS | SYSTOLIC BLOOD PRESSURE: 122 MMHG | BODY MASS INDEX: 39.4 KG/M2 | TEMPERATURE: 97.9 F | DIASTOLIC BLOOD PRESSURE: 78 MMHG | RESPIRATION RATE: 16 BRPM | HEART RATE: 82 BPM

## 2018-01-24 DIAGNOSIS — E11.610 CHARCOT FOOT DUE TO DIABETES MELLITUS (HCC): Primary | ICD-10-CM

## 2018-01-24 PROCEDURE — 1036F TOBACCO NON-USER: CPT | Performed by: PODIATRIST

## 2018-01-24 PROCEDURE — 3046F HEMOGLOBIN A1C LEVEL >9.0%: CPT | Performed by: PODIATRIST

## 2018-01-24 PROCEDURE — G8417 CALC BMI ABV UP PARAM F/U: HCPCS | Performed by: PODIATRIST

## 2018-01-24 PROCEDURE — G8484 FLU IMMUNIZE NO ADMIN: HCPCS | Performed by: PODIATRIST

## 2018-01-24 PROCEDURE — 3014F SCREEN MAMMO DOC REV: CPT | Performed by: PODIATRIST

## 2018-01-24 PROCEDURE — 99213 OFFICE O/P EST LOW 20 MIN: CPT | Performed by: PODIATRIST

## 2018-01-24 PROCEDURE — 3017F COLORECTAL CA SCREEN DOC REV: CPT | Performed by: PODIATRIST

## 2018-01-24 PROCEDURE — G8427 DOCREV CUR MEDS BY ELIG CLIN: HCPCS | Performed by: PODIATRIST

## 2018-03-05 DIAGNOSIS — E11.42 DM TYPE 2 WITH DIABETIC PERIPHERAL NEUROPATHY (HCC): ICD-10-CM

## 2018-03-05 DIAGNOSIS — D70.2 DRUG-INDUCED LEUKOPENIA (HCC): ICD-10-CM

## 2018-03-05 LAB
ANION GAP SERPL CALCULATED.3IONS-SCNC: 16 MEQ/L (ref 7–13)
BASOPHILS ABSOLUTE: 0 K/UL (ref 0–0.2)
BASOPHILS RELATIVE PERCENT: 0.9 %
BUN BLDV-MCNC: 11 MG/DL (ref 6–20)
CALCIUM SERPL-MCNC: 9.5 MG/DL (ref 8.6–10.2)
CHLORIDE BLD-SCNC: 101 MEQ/L (ref 98–107)
CO2: 26 MEQ/L (ref 22–29)
CREAT SERPL-MCNC: 0.83 MG/DL (ref 0.5–0.9)
EOSINOPHILS ABSOLUTE: 0.1 K/UL (ref 0–0.7)
EOSINOPHILS RELATIVE PERCENT: 2.1 %
GFR AFRICAN AMERICAN: >60
GFR NON-AFRICAN AMERICAN: >60
GLUCOSE BLD-MCNC: 208 MG/DL (ref 74–109)
HBA1C MFR BLD: 7.6 % (ref 4.8–5.9)
HCT VFR BLD CALC: 37.5 % (ref 37–47)
HEMOGLOBIN: 12.6 G/DL (ref 12–16)
LYMPHOCYTES ABSOLUTE: 0.6 K/UL (ref 1–4.8)
LYMPHOCYTES RELATIVE PERCENT: 23.1 %
MCH RBC QN AUTO: 29.3 PG (ref 27–31.3)
MCHC RBC AUTO-ENTMCNC: 33.5 % (ref 33–37)
MCV RBC AUTO: 87.4 FL (ref 82–100)
MONOCYTES ABSOLUTE: 0.2 K/UL (ref 0.2–0.8)
MONOCYTES RELATIVE PERCENT: 8.2 %
NEUTROPHILS ABSOLUTE: 1.8 K/UL (ref 1.4–6.5)
NEUTROPHILS RELATIVE PERCENT: 65.7 %
PDW BLD-RTO: 17 % (ref 11.5–14.5)
PLATELET # BLD: 96 K/UL (ref 130–400)
PLATELET SLIDE REVIEW: ABNORMAL
POTASSIUM SERPL-SCNC: 4.5 MEQ/L (ref 3.5–5.1)
RBC # BLD: 4.29 M/UL (ref 4.2–5.4)
RBC # BLD: NORMAL 10*6/UL
SODIUM BLD-SCNC: 143 MEQ/L (ref 132–144)
WBC # BLD: 2.8 K/UL (ref 4.8–10.8)

## 2018-03-12 ENCOUNTER — OFFICE VISIT (OUTPATIENT)
Dept: FAMILY MEDICINE CLINIC | Age: 58
End: 2018-03-12
Payer: COMMERCIAL

## 2018-03-12 VITALS
RESPIRATION RATE: 16 BRPM | HEIGHT: 68 IN | SYSTOLIC BLOOD PRESSURE: 134 MMHG | TEMPERATURE: 96.3 F | HEART RATE: 83 BPM | WEIGHT: 259 LBS | BODY MASS INDEX: 39.25 KG/M2 | DIASTOLIC BLOOD PRESSURE: 78 MMHG

## 2018-03-12 DIAGNOSIS — H65.02 ACUTE SEROUS OTITIS MEDIA OF LEFT EAR, RECURRENCE NOT SPECIFIED: ICD-10-CM

## 2018-03-12 DIAGNOSIS — Z79.4 TYPE 2 DIABETES MELLITUS WITHOUT COMPLICATION, WITH LONG-TERM CURRENT USE OF INSULIN (HCC): Primary | ICD-10-CM

## 2018-03-12 DIAGNOSIS — D70.2 DRUG-INDUCED LEUKOPENIA (HCC): ICD-10-CM

## 2018-03-12 DIAGNOSIS — H61.22 IMPACTED CERUMEN OF LEFT EAR: ICD-10-CM

## 2018-03-12 DIAGNOSIS — I10 ESSENTIAL HYPERTENSION: ICD-10-CM

## 2018-03-12 DIAGNOSIS — R30.0 DYSURIA: ICD-10-CM

## 2018-03-12 DIAGNOSIS — N30.90 CYSTITIS: ICD-10-CM

## 2018-03-12 DIAGNOSIS — E11.9 TYPE 2 DIABETES MELLITUS WITHOUT COMPLICATION, WITH LONG-TERM CURRENT USE OF INSULIN (HCC): Primary | ICD-10-CM

## 2018-03-12 LAB
BILIRUBIN, POC: ABNORMAL
BLOOD URINE, POC: ABNORMAL
CLARITY, POC: CLEAR
COLOR, POC: YELLOW
GLUCOSE URINE, POC: ABNORMAL
KETONES, POC: ABNORMAL
LEUKOCYTE EST, POC: ABNORMAL
NITRITE, POC: ABNORMAL
PH, POC: 6
PROTEIN, POC: ABNORMAL
SPECIFIC GRAVITY, POC: 1.01
UROBILINOGEN, POC: ABNORMAL

## 2018-03-12 PROCEDURE — G8417 CALC BMI ABV UP PARAM F/U: HCPCS | Performed by: FAMILY MEDICINE

## 2018-03-12 PROCEDURE — 1036F TOBACCO NON-USER: CPT | Performed by: FAMILY MEDICINE

## 2018-03-12 PROCEDURE — 3045F PR MOST RECENT HEMOGLOBIN A1C LEVEL 7.0-9.0%: CPT | Performed by: FAMILY MEDICINE

## 2018-03-12 PROCEDURE — 81003 URINALYSIS AUTO W/O SCOPE: CPT | Performed by: FAMILY MEDICINE

## 2018-03-12 PROCEDURE — 3014F SCREEN MAMMO DOC REV: CPT | Performed by: FAMILY MEDICINE

## 2018-03-12 PROCEDURE — G8482 FLU IMMUNIZE ORDER/ADMIN: HCPCS | Performed by: FAMILY MEDICINE

## 2018-03-12 PROCEDURE — 3017F COLORECTAL CA SCREEN DOC REV: CPT | Performed by: FAMILY MEDICINE

## 2018-03-12 PROCEDURE — G8427 DOCREV CUR MEDS BY ELIG CLIN: HCPCS | Performed by: FAMILY MEDICINE

## 2018-03-12 PROCEDURE — 99214 OFFICE O/P EST MOD 30 MIN: CPT | Performed by: FAMILY MEDICINE

## 2018-03-12 PROCEDURE — 69210 REMOVE IMPACTED EAR WAX UNI: CPT | Performed by: FAMILY MEDICINE

## 2018-03-12 RX ORDER — FLUTICASONE PROPIONATE 50 MCG
2 SPRAY, SUSPENSION (ML) NASAL DAILY
Qty: 1 BOTTLE | Refills: 3 | Status: SHIPPED | OUTPATIENT
Start: 2018-03-12 | End: 2018-05-29 | Stop reason: ALTCHOICE

## 2018-03-12 RX ORDER — GLIPIZIDE 10 MG/1
TABLET ORAL
Qty: 90 TABLET | Refills: 5 | Status: SHIPPED | OUTPATIENT
Start: 2018-03-12 | End: 2018-08-02 | Stop reason: SDUPTHER

## 2018-03-12 RX ORDER — SULFAMETHOXAZOLE AND TRIMETHOPRIM 800; 160 MG/1; MG/1
1 TABLET ORAL 2 TIMES DAILY
Qty: 20 TABLET | Refills: 0 | Status: SHIPPED | OUTPATIENT
Start: 2018-03-12 | End: 2018-03-22

## 2018-03-12 NOTE — PROGRESS NOTES
tenderness. Mouth/Throat: No oral lesions. Posterior oropharyngeal erythema present. No oropharyngeal exudate. Eyes: No scleral icterus. Neck: Normal range of motion. Neck supple. Carotid bruit is not present. No neck rigidity. No thyroid mass and no thyromegaly present. Cardiovascular: Normal rate, regular rhythm, S1 normal, S2 normal and normal heart sounds. No extrasystoles are present. No murmur heard. Pulses:       Dorsalis pedis pulses are 2+ on the right side, and 2+ on the left side. Posterior tibial pulses are 2+ on the right side, and 2+ on the left side. Pulmonary/Chest: Effort normal. No respiratory distress. She has no wheezes. She has rhonchi in the left upper field. She has no rales. Abdominal: Normal appearance and bowel sounds are normal. She exhibits no mass. There is no hepatosplenomegaly. There is tenderness (tr/4 tx) in the suprapubic area. There is no rebound and no CVA tenderness. Musculoskeletal: She exhibits no edema. Skin: No rash (no new rashes) noted. She is not diaphoretic. Psychiatric: Mood, memory and affect normal.     Orders Only on 03/05/2018   Component Date Value Ref Range Status    WBC 03/05/2018 2.8* 4.8 - 10.8 K/uL Final    RBC 03/05/2018 4.29  4. 20 - 5.40 M/uL Final    Hemoglobin 03/05/2018 12.6  12.0 - 16.0 g/dL Final    Hematocrit 03/05/2018 37.5  37.0 - 47.0 % Final    MCV 03/05/2018 87.4  82.0 - 100.0 fL Final    MCH 03/05/2018 29.3  27.0 - 31.3 pg Final    MCHC 03/05/2018 33.5  33.0 - 37.0 % Final    RDW 03/05/2018 17.0* 11.5 - 14.5 % Final    Platelets 82/57/2815 96* 130 - 400 K/uL Final    PLATELET SLIDE REVIEW 03/05/2018 Decreased   Final    Neutrophils % 03/05/2018 65.7  % Final    Lymphocytes % 03/05/2018 23.1  % Final    Monocytes % 03/05/2018 8.2  % Final    Eosinophils % 03/05/2018 2.1  % Final    Basophils % 03/05/2018 0.9  % Final    Neutrophils # 03/05/2018 1.8  1.4 - 6.5 K/uL Final    Lymphocytes # 03/05/2018 0.6* Neg    Ketones, UA 03/12/2018  9:46 AM Unknown   Neg    Spec Grav, UA 03/12/2018  9:46 AM Unknown   1.015    Blood, UA POC 03/12/2018  9:46 AM Unknown   Neg    pH, UA 03/12/2018  9:46 AM Unknown   6.0    Protein, UA POC 03/12/2018  9:46 AM Unknown   Neg    Urobilinogen, UA 03/12/2018  9:46 AM Unknown   3.5 umol/L    Leukocytes, UA  (Abnormal) 03/12/2018  9:46 AM Unknown   70 Castillo/uL     Nitrite, UA 03/12/2018  9:46 AM Unknown   Neg    Lab and      ;  Assessment & Plan   1. Type 2 diabetes mellitus without complication, with long-term current use of insulin (HCC)  glipiZIDE (GLUCOTROL) 10 MG tablet    metFORMIN (GLUCOPHAGE) 1000 MG tablet    insulin glargine (BASAGLAR KWIKPEN) 100 UNIT/ML injection pen   2. Dysuria  POCT Urinalysis No Micro (Auto)    Urine Culture   3. Cystitis  POCT Urinalysis No Micro (Auto)    Urine Culture    sulfamethoxazole-trimethoprim (BACTRIM DS;SEPTRA DS) 800-160 MG per tablet   4. Impacted cerumen of left ear  74245 - NE REMOVE IMPACTED EAR WAX   5. Acute serous otitis media of left ear, recurrence not specified  sulfamethoxazole-trimethoprim (BACTRIM DS;SEPTRA DS) 800-160 MG per tablet    fluticasone (FLONASE) 50 MCG/ACT nasal spray   6. Drug-induced leukopenia (HCC),improved     7. Essential hypertension     disc lab and abnl ua  morena. bds and will start[c&s pending]  f/u Pod/Rheum prn  Call if worse;Ceruminosis is noted. Wax is removed by syringing and manual debridement. Instructions for home care to prevent wax buildup are given. Nonda Musa    ;See above orders, as use and side effects reviewed ;   Outpatient Encounter Prescriptions as of 3/12/2018   Medication Sig Dispense Refill    glipiZIDE (GLUCOTROL) 10 MG tablet TAKE ONE TABLET BY MOUTH THREE TIMES DAILY BEFORE MEAL(S) 90 tablet 5    metFORMIN (GLUCOPHAGE) 1000 MG tablet TAKE ONE TABLET BY MOUTH TWICE DAILY WITH MEALS 60 tablet 5    insulin glargine (BASAGLAR KWIKPEN) 100 UNIT/ML injection pen Inject 60 Units into the skin every DS) 800-160 MG per tablet     Sig: Take 1 tablet by mouth 2 times daily for 10 days Take with food     Dispense:  20 tablet     Refill:  0    fluticasone (FLONASE) 50 MCG/ACT nasal spray     Si sprays by Nasal route daily     Dispense:  1 Bottle     Refill:  3     Medications Discontinued During This Encounter   Medication Reason    glipiZIDE (GLUCOTROL) 10 MG tablet Reorder    metFORMIN (GLUCOPHAGE) 1000 MG tablet Reorder    insulin glargine (BASAGLAR KWIKPEN) 100 UNIT/ML injection pen Reorder     Return in about 2 months (around 2018). Cbc in 2 mos  Call or return to clinic prn if these symptoms worsen or fail to improve as anticipated.       Vishal Novoa, DO

## 2018-03-14 ENCOUNTER — OFFICE VISIT (OUTPATIENT)
Dept: PODIATRY | Age: 58
End: 2018-03-14
Payer: COMMERCIAL

## 2018-03-14 VITALS
BODY MASS INDEX: 40.62 KG/M2 | RESPIRATION RATE: 14 BRPM | OXYGEN SATURATION: 98 % | WEIGHT: 268 LBS | HEART RATE: 87 BPM | DIASTOLIC BLOOD PRESSURE: 72 MMHG | TEMPERATURE: 98 F | SYSTOLIC BLOOD PRESSURE: 124 MMHG | HEIGHT: 68 IN

## 2018-03-14 DIAGNOSIS — M19.90 OSTEOARTHRITIS, UNSPECIFIED OSTEOARTHRITIS TYPE, UNSPECIFIED SITE: Primary | ICD-10-CM

## 2018-03-14 DIAGNOSIS — M14.671 CHARCOT'S JOINT OF RIGHT FOOT: ICD-10-CM

## 2018-03-14 PROCEDURE — G8427 DOCREV CUR MEDS BY ELIG CLIN: HCPCS | Performed by: PODIATRIST

## 2018-03-14 PROCEDURE — 1036F TOBACCO NON-USER: CPT | Performed by: PODIATRIST

## 2018-03-14 PROCEDURE — 3014F SCREEN MAMMO DOC REV: CPT | Performed by: PODIATRIST

## 2018-03-14 PROCEDURE — G8417 CALC BMI ABV UP PARAM F/U: HCPCS | Performed by: PODIATRIST

## 2018-03-14 PROCEDURE — 3017F COLORECTAL CA SCREEN DOC REV: CPT | Performed by: PODIATRIST

## 2018-03-14 PROCEDURE — G8482 FLU IMMUNIZE ORDER/ADMIN: HCPCS | Performed by: PODIATRIST

## 2018-03-14 PROCEDURE — 99213 OFFICE O/P EST LOW 20 MIN: CPT | Performed by: PODIATRIST

## 2018-03-15 DIAGNOSIS — J01.90 ACUTE BACTERIAL SINUSITIS: Primary | ICD-10-CM

## 2018-03-15 DIAGNOSIS — B96.89 ACUTE BACTERIAL SINUSITIS: Primary | ICD-10-CM

## 2018-03-15 DIAGNOSIS — Z88.1 DRUG ALLERGY, ANTIBIOTIC: ICD-10-CM

## 2018-03-15 LAB
ORGANISM: ABNORMAL
URINE CULTURE, ROUTINE: ABNORMAL
URINE CULTURE, ROUTINE: ABNORMAL

## 2018-03-15 RX ORDER — AMOXICILLIN AND CLAVULANATE POTASSIUM 875; 125 MG/1; MG/1
1 TABLET, FILM COATED ORAL 2 TIMES DAILY
Qty: 20 TABLET | Refills: 0 | Status: SHIPPED | OUTPATIENT
Start: 2018-03-15 | End: 2018-03-25

## 2018-03-15 RX ORDER — METHYLPREDNISOLONE 4 MG/1
TABLET ORAL
Qty: 21 TABLET | Refills: 1 | Status: SHIPPED | OUTPATIENT
Start: 2018-03-15 | End: 2018-05-29 | Stop reason: ALTCHOICE

## 2018-03-19 ASSESSMENT — ENCOUNTER SYMPTOMS
COUGH: 1
BACK PAIN: 1
NAUSEA: 1
CHEST TIGHTNESS: 0
DIARRHEA: 0
SORE THROAT: 0
CONSTIPATION: 0
VOICE CHANGE: 0
SHORTNESS OF BREATH: 0
ABDOMINAL PAIN: 0
RHINORRHEA: 1
SINUS PAIN: 1

## 2018-05-29 ENCOUNTER — OFFICE VISIT (OUTPATIENT)
Dept: FAMILY MEDICINE CLINIC | Age: 58
End: 2018-05-29
Payer: COMMERCIAL

## 2018-05-29 VITALS
DIASTOLIC BLOOD PRESSURE: 74 MMHG | TEMPERATURE: 97.2 F | SYSTOLIC BLOOD PRESSURE: 128 MMHG | RESPIRATION RATE: 14 BRPM | HEART RATE: 80 BPM | HEIGHT: 68 IN | WEIGHT: 265 LBS | BODY MASS INDEX: 40.16 KG/M2

## 2018-05-29 DIAGNOSIS — N30.90 CYSTITIS: ICD-10-CM

## 2018-05-29 DIAGNOSIS — I10 ESSENTIAL HYPERTENSION: ICD-10-CM

## 2018-05-29 DIAGNOSIS — E11.42 DM TYPE 2 WITH DIABETIC PERIPHERAL NEUROPATHY (HCC): Primary | ICD-10-CM

## 2018-05-29 LAB
BILIRUBIN, POC: NORMAL
BLOOD URINE, POC: NORMAL
CLARITY, POC: CLEAR
COLOR, POC: YELLOW
GLUCOSE URINE, POC: NORMAL
KETONES, POC: NORMAL
LEUKOCYTE EST, POC: NORMAL
NITRITE, POC: NORMAL
PH, POC: 6
PROTEIN, POC: NORMAL
SPECIFIC GRAVITY, POC: 1.01
UROBILINOGEN, POC: NORMAL

## 2018-05-29 PROCEDURE — 2022F DILAT RTA XM EVC RTNOPTHY: CPT | Performed by: FAMILY MEDICINE

## 2018-05-29 PROCEDURE — 81003 URINALYSIS AUTO W/O SCOPE: CPT | Performed by: FAMILY MEDICINE

## 2018-05-29 PROCEDURE — G8417 CALC BMI ABV UP PARAM F/U: HCPCS | Performed by: FAMILY MEDICINE

## 2018-05-29 PROCEDURE — G8427 DOCREV CUR MEDS BY ELIG CLIN: HCPCS | Performed by: FAMILY MEDICINE

## 2018-05-29 PROCEDURE — 3045F PR MOST RECENT HEMOGLOBIN A1C LEVEL 7.0-9.0%: CPT | Performed by: FAMILY MEDICINE

## 2018-05-29 PROCEDURE — 1036F TOBACCO NON-USER: CPT | Performed by: FAMILY MEDICINE

## 2018-05-29 PROCEDURE — 3017F COLORECTAL CA SCREEN DOC REV: CPT | Performed by: FAMILY MEDICINE

## 2018-05-29 PROCEDURE — 99213 OFFICE O/P EST LOW 20 MIN: CPT | Performed by: FAMILY MEDICINE

## 2018-05-30 RX ORDER — GABAPENTIN 300 MG/1
CAPSULE ORAL
Qty: 90 CAPSULE | Refills: 5 | Status: SHIPPED | OUTPATIENT
Start: 2018-05-30 | End: 2019-02-01 | Stop reason: SDUPTHER

## 2018-06-04 ASSESSMENT — ENCOUNTER SYMPTOMS
BLOOD IN STOOL: 0
SHORTNESS OF BREATH: 0
ABDOMINAL PAIN: 0
CHEST TIGHTNESS: 0
NAUSEA: 0

## 2018-06-13 ENCOUNTER — OFFICE VISIT (OUTPATIENT)
Dept: PODIATRY | Age: 58
End: 2018-06-13
Payer: COMMERCIAL

## 2018-06-13 VITALS
HEART RATE: 66 BPM | RESPIRATION RATE: 14 BRPM | TEMPERATURE: 97 F | WEIGHT: 266.7 LBS | DIASTOLIC BLOOD PRESSURE: 78 MMHG | HEIGHT: 68 IN | BODY MASS INDEX: 40.42 KG/M2 | OXYGEN SATURATION: 99 % | SYSTOLIC BLOOD PRESSURE: 122 MMHG

## 2018-06-13 DIAGNOSIS — E11.610 CHARCOT FOOT DUE TO DIABETES MELLITUS (HCC): Primary | ICD-10-CM

## 2018-06-13 PROCEDURE — 1036F TOBACCO NON-USER: CPT | Performed by: PODIATRIST

## 2018-06-13 PROCEDURE — 3045F PR MOST RECENT HEMOGLOBIN A1C LEVEL 7.0-9.0%: CPT | Performed by: PODIATRIST

## 2018-06-13 PROCEDURE — 99213 OFFICE O/P EST LOW 20 MIN: CPT | Performed by: PODIATRIST

## 2018-06-13 PROCEDURE — 3017F COLORECTAL CA SCREEN DOC REV: CPT | Performed by: PODIATRIST

## 2018-06-13 PROCEDURE — G8417 CALC BMI ABV UP PARAM F/U: HCPCS | Performed by: PODIATRIST

## 2018-06-13 PROCEDURE — 2022F DILAT RTA XM EVC RTNOPTHY: CPT | Performed by: PODIATRIST

## 2018-06-13 PROCEDURE — G8427 DOCREV CUR MEDS BY ELIG CLIN: HCPCS | Performed by: PODIATRIST

## 2018-06-19 ENCOUNTER — NURSE ONLY (OUTPATIENT)
Dept: FAMILY MEDICINE CLINIC | Age: 58
End: 2018-06-19

## 2018-06-21 ENCOUNTER — PATIENT MESSAGE (OUTPATIENT)
Dept: FAMILY MEDICINE CLINIC | Age: 58
End: 2018-06-21

## 2018-06-22 RX ORDER — INSULIN LISPRO 100 [IU]/ML
INJECTION, SUSPENSION SUBCUTANEOUS
Qty: 5 PEN | Refills: 5 | Status: SHIPPED | OUTPATIENT
Start: 2018-06-22 | End: 2018-06-25 | Stop reason: CLARIF

## 2018-08-02 ENCOUNTER — OFFICE VISIT (OUTPATIENT)
Dept: FAMILY MEDICINE CLINIC | Age: 58
End: 2018-08-02
Payer: COMMERCIAL

## 2018-08-02 VITALS
WEIGHT: 260 LBS | DIASTOLIC BLOOD PRESSURE: 74 MMHG | TEMPERATURE: 96.8 F | HEIGHT: 68 IN | RESPIRATION RATE: 14 BRPM | HEART RATE: 73 BPM | BODY MASS INDEX: 39.4 KG/M2 | SYSTOLIC BLOOD PRESSURE: 116 MMHG

## 2018-08-02 DIAGNOSIS — E11.9 TYPE 2 DIABETES MELLITUS WITHOUT COMPLICATION, WITH LONG-TERM CURRENT USE OF INSULIN (HCC): Primary | ICD-10-CM

## 2018-08-02 DIAGNOSIS — Z79.4 TYPE 2 DIABETES MELLITUS WITHOUT COMPLICATION, WITH LONG-TERM CURRENT USE OF INSULIN (HCC): Primary | ICD-10-CM

## 2018-08-02 PROCEDURE — G8427 DOCREV CUR MEDS BY ELIG CLIN: HCPCS | Performed by: FAMILY MEDICINE

## 2018-08-02 PROCEDURE — 3017F COLORECTAL CA SCREEN DOC REV: CPT | Performed by: FAMILY MEDICINE

## 2018-08-02 PROCEDURE — 2022F DILAT RTA XM EVC RTNOPTHY: CPT | Performed by: FAMILY MEDICINE

## 2018-08-02 PROCEDURE — 1036F TOBACCO NON-USER: CPT | Performed by: FAMILY MEDICINE

## 2018-08-02 PROCEDURE — 3045F PR MOST RECENT HEMOGLOBIN A1C LEVEL 7.0-9.0%: CPT | Performed by: FAMILY MEDICINE

## 2018-08-02 PROCEDURE — G8417 CALC BMI ABV UP PARAM F/U: HCPCS | Performed by: FAMILY MEDICINE

## 2018-08-02 PROCEDURE — 99213 OFFICE O/P EST LOW 20 MIN: CPT | Performed by: FAMILY MEDICINE

## 2018-08-02 RX ORDER — GLIPIZIDE 10 MG/1
TABLET ORAL
Qty: 90 TABLET | Refills: 5 | Status: SHIPPED | OUTPATIENT
Start: 2018-08-02 | End: 2019-03-13 | Stop reason: SDUPTHER

## 2018-08-02 ASSESSMENT — PATIENT HEALTH QUESTIONNAIRE - PHQ9
1. LITTLE INTEREST OR PLEASURE IN DOING THINGS: 0
SUM OF ALL RESPONSES TO PHQ QUESTIONS 1-9: 0
SUM OF ALL RESPONSES TO PHQ9 QUESTIONS 1 & 2: 0
2. FEELING DOWN, DEPRESSED OR HOPELESS: 0

## 2018-08-09 ASSESSMENT — ENCOUNTER SYMPTOMS
ABDOMINAL PAIN: 0
CHEST TIGHTNESS: 0
NAUSEA: 0
DIARRHEA: 0
SHORTNESS OF BREATH: 0
BLOOD IN STOOL: 0

## 2018-08-15 ENCOUNTER — OFFICE VISIT (OUTPATIENT)
Dept: PODIATRY | Age: 58
End: 2018-08-15
Payer: COMMERCIAL

## 2018-08-15 VITALS
OXYGEN SATURATION: 97 % | BODY MASS INDEX: 40.28 KG/M2 | HEART RATE: 79 BPM | DIASTOLIC BLOOD PRESSURE: 80 MMHG | TEMPERATURE: 97 F | HEIGHT: 68 IN | RESPIRATION RATE: 14 BRPM | SYSTOLIC BLOOD PRESSURE: 136 MMHG | WEIGHT: 265.8 LBS

## 2018-08-15 DIAGNOSIS — E11.610 CHARCOT FOOT DUE TO DIABETES MELLITUS (HCC): Primary | ICD-10-CM

## 2018-08-15 PROCEDURE — 2022F DILAT RTA XM EVC RTNOPTHY: CPT | Performed by: PODIATRIST

## 2018-08-15 PROCEDURE — 1036F TOBACCO NON-USER: CPT | Performed by: PODIATRIST

## 2018-08-15 PROCEDURE — 99213 OFFICE O/P EST LOW 20 MIN: CPT | Performed by: PODIATRIST

## 2018-08-15 PROCEDURE — G8417 CALC BMI ABV UP PARAM F/U: HCPCS | Performed by: PODIATRIST

## 2018-08-15 PROCEDURE — 3045F PR MOST RECENT HEMOGLOBIN A1C LEVEL 7.0-9.0%: CPT | Performed by: PODIATRIST

## 2018-08-15 PROCEDURE — 3017F COLORECTAL CA SCREEN DOC REV: CPT | Performed by: PODIATRIST

## 2018-08-15 PROCEDURE — G8427 DOCREV CUR MEDS BY ELIG CLIN: HCPCS | Performed by: PODIATRIST

## 2018-10-17 DIAGNOSIS — Z79.4 TYPE 2 DIABETES MELLITUS WITHOUT COMPLICATION, WITH LONG-TERM CURRENT USE OF INSULIN (HCC): ICD-10-CM

## 2018-10-17 DIAGNOSIS — E11.9 TYPE 2 DIABETES MELLITUS WITHOUT COMPLICATION, WITH LONG-TERM CURRENT USE OF INSULIN (HCC): ICD-10-CM

## 2018-10-17 LAB
ANION GAP SERPL CALCULATED.3IONS-SCNC: 19 MEQ/L (ref 7–13)
BUN BLDV-MCNC: 14 MG/DL (ref 6–20)
CALCIUM SERPL-MCNC: 9.3 MG/DL (ref 8.6–10.2)
CHLORIDE BLD-SCNC: 100 MEQ/L (ref 98–107)
CO2: 24 MEQ/L (ref 22–29)
CREAT SERPL-MCNC: 0.82 MG/DL (ref 0.5–0.9)
GFR AFRICAN AMERICAN: >60
GFR NON-AFRICAN AMERICAN: >60
GLUCOSE BLD-MCNC: 137 MG/DL (ref 74–109)
HBA1C MFR BLD: 6.8 % (ref 4.8–5.9)
LDL CHOLESTEROL DIRECT: 79 MG/DL (ref 0–129)
POTASSIUM SERPL-SCNC: 4.6 MEQ/L (ref 3.5–5.1)
SODIUM BLD-SCNC: 143 MEQ/L (ref 132–144)

## 2018-11-09 ENCOUNTER — OFFICE VISIT (OUTPATIENT)
Dept: FAMILY MEDICINE CLINIC | Age: 58
End: 2018-11-09
Payer: COMMERCIAL

## 2018-11-09 VITALS
TEMPERATURE: 97.1 F | BODY MASS INDEX: 40.87 KG/M2 | DIASTOLIC BLOOD PRESSURE: 74 MMHG | WEIGHT: 268.8 LBS | SYSTOLIC BLOOD PRESSURE: 118 MMHG | HEART RATE: 88 BPM | RESPIRATION RATE: 18 BRPM

## 2018-11-09 DIAGNOSIS — Z79.4 CONTROLLED TYPE 2 DIABETES MELLITUS WITH INSULIN THERAPY (HCC): Primary | ICD-10-CM

## 2018-11-09 DIAGNOSIS — E53.8 B12 DEFICIENCY: ICD-10-CM

## 2018-11-09 DIAGNOSIS — E11.9 CONTROLLED TYPE 2 DIABETES MELLITUS WITH INSULIN THERAPY (HCC): Primary | ICD-10-CM

## 2018-11-09 DIAGNOSIS — M14.679 CHARCOT'S JOINT OF ANKLE, UNSPECIFIED LATERALITY: ICD-10-CM

## 2018-11-09 PROCEDURE — 99213 OFFICE O/P EST LOW 20 MIN: CPT | Performed by: FAMILY MEDICINE

## 2018-11-09 PROCEDURE — 3044F HG A1C LEVEL LT 7.0%: CPT | Performed by: FAMILY MEDICINE

## 2018-11-09 PROCEDURE — G8417 CALC BMI ABV UP PARAM F/U: HCPCS | Performed by: FAMILY MEDICINE

## 2018-11-09 PROCEDURE — 3017F COLORECTAL CA SCREEN DOC REV: CPT | Performed by: FAMILY MEDICINE

## 2018-11-09 PROCEDURE — 1036F TOBACCO NON-USER: CPT | Performed by: FAMILY MEDICINE

## 2018-11-09 PROCEDURE — 2022F DILAT RTA XM EVC RTNOPTHY: CPT | Performed by: FAMILY MEDICINE

## 2018-11-09 PROCEDURE — G8427 DOCREV CUR MEDS BY ELIG CLIN: HCPCS | Performed by: FAMILY MEDICINE

## 2018-11-09 PROCEDURE — G8484 FLU IMMUNIZE NO ADMIN: HCPCS | Performed by: FAMILY MEDICINE

## 2018-11-09 RX ORDER — LANOLIN ALCOHOL/MO/W.PET/CERES
1000 CREAM (GRAM) TOPICAL DAILY
COMMUNITY

## 2018-11-09 NOTE — PROGRESS NOTES
;  Subjective  Sean Hernandez, 63 y.o. female presents today with:  Chief Complaint   Patient presents with    Diabetes     follow up    Immunizations     flu shot       HPI  To recheck dm  Overall, on 2-oral rx and split insulin, am sugars better[]  Rev/rxs; No abuse nor side effects on meds   F/u Podiatry[charcots], and Rheum[SLE]  Back/knees--same and on chr brace for left foot/ankle  ; No cardio-pulmonary probs;compliant with diet/rxs;no new gi-gu complaints   ;NO cp/palp/mora/edema   Mood-stable  Review of Systems   Constitutional: Positive for fatigue. Negative for fever and unexpected weight change. Eyes: Negative for visual disturbance. Respiratory: Negative for shortness of breath. Cardiovascular: Negative for palpitations and leg swelling. Gastrointestinal: Negative for abdominal pain, blood in stool and diarrhea. Genitourinary: Negative for dysuria, flank pain and frequency. Musculoskeletal: Positive for arthralgias and myalgias. Skin: Negative for pallor. Neurological: Positive for weakness (of both ankle) and numbness (lower feet). Negative for light-headedness and headaches. Psychiatric/Behavioral: Negative for dysphoric mood. Allergies   Allergen Reactions    Biaxin [Clarithromycin]      gi    Codeine Itching    Cephalexin Nausea And Vomiting       Past Medical History:   Diagnosis Date    DJD (degenerative joint disease)     Dyslipidemia     Hypertension     Sensory neuropathy     Systemic lupus erythematosus ([address]     Type II diabetes mellitus ([facility] [address]      Past Surgical History:   Procedure Laterality Date    CHOLECYSTECTOMY  2008    DR. TAVERA    CYST REMOVAL  1995    RIGHT HAND    HEMORRHOID SURGERY  1982    OTHER SURGICAL HISTORY Left 09/01/2016    CARMELITA WEBBER DPM  ARTHRODESIS TALONAVICULAR NAVICULAR CUNEIFORM 1ST METATARSAL CUNEIFORM BONE GRAFT    TUBAL LIGATION  1988    TUMOR REMOVAL  1984    LEFT LEG     Social History     Social History    Marital status:      Spouse name: N/A    Number of children: N/A    Years of education: N/A     Occupational History    Not on file. Social History Main Topics    Smoking status: Former Smoker     Packs/day: 1.50     Years: 27.00     Types: Cigarettes     Start date: 6/15/1974     Quit date: 4/17/2001    Smokeless tobacco: Never Used      Comment: Quit    Alcohol use No    Drug use: No    Sexual activity: Yes     Partners: Male      Comment:      Other Topics Concern    Not on file     Social History Narrative    No narrative on file     Family History   Problem Relation Age of Onset    Heart Failure Father     Diabetes Mother           Current Outpatient Prescriptions on File Prior to Visit   Medication Sig Dispense Refill    metFORMIN (GLUCOPHAGE) 1000 MG tablet TAKE ONE TABLET BY MOUTH TWICE DAILY WITH MEALS 60 tablet 5    glipiZIDE (GLUCOTROL) 10 MG tablet TAKE ONE TABLET BY MOUTH THREE TIMES DAILY BEFORE MEAL(S) 90 tablet 5    insulin aspart protamine-insulin aspart (NOVOLOG MIX 70/30 FLEXPEN) (70-30) 100 UNIT/ML injection Inject 20 Units into the skin 2 times daily (with meals) 5 pen 5    hydroxychloroquine (PLAQUENIL) 200 MG tablet       Cholecalciferol (VITAMIN D3) 34141 UNITS CAPS Take 1 capsule by mouth once a week      gabapentin (NEURONTIN) 300 MG capsule TAKE ONE CAPSULE BY MOUTH THREE TIMES DAILY. 90 capsule 5     No current facility-administered medications on file prior to visit. Objective    Vitals:    11/09/18 0854   BP: 118/74   Site: Left Upper Arm   Position: Sitting   Cuff Size: Large Adult   Pulse: 88   Resp: 18   Temp: 97.1 °F (36.2 °C)   TempSrc: Temporal   Weight: 268 lb 12.8 oz (121.9 kg)     Physical Exam   Constitutional: She is oriented to person, place, and time. She appears well-developed and well-nourished. No distress. HENT:   Head: Normocephalic. Right Ear: Hearing normal. Tympanic membrane is retracted. Tympanic membrane is not injected. D3) 90162 UNITS CAPS Take 1 capsule by mouth once a week      gabapentin (NEURONTIN) 300 MG capsule TAKE ONE CAPSULE BY MOUTH THREE TIMES DAILY. 90 capsule 5     No facility-administered encounter medications on file as of 11/9/2018. Orders Placed This Encounter   Procedures    Hemoglobin A1C     Standing Status:   Future     Standing Expiration Date:   11/9/2019    Basic Metabolic Panel     Standing Status:   Future     Standing Expiration Date:   11/9/2019     No orders of the defined types were placed in this encounter. There are no discontinued medications. Return in about 4 months (around 3/9/2019) for dm.  ;continue accuchecks and DM/diet/exercise;continue annual eye.a nd nightly foot exam;   Same rx as above,use s/e reviewed;target goals reviewed;report significant glucose changes; keep f/u lab and appt.;prior home/ov lab reviewed   Call or return to clinic prn if these symptoms worsen or fail to improve as anticipated. Zulma Mayen DO          Assessment & Plan    Diagnosis Orders   1. Controlled type 2 diabetes mellitus with insulin therapy ([facility] [address]  Hemoglobin M3S    Basic Metabolic Panel   2. Charcot's joint of ankle, unspecified laterality     3. B12 deficiency  vitamin B-12 (CYANOCOBALAMIN) 1000 MCG tablet     Orders Placed This Encounter   Procedures    Hemoglobin A1C     Standing Status:   Future     Standing Expiration Date:   11/9/2019    Basic Metabolic Panel     Standing Status:   Future     Standing Expiration Date:   11/9/2019     No orders of the defined types were placed in this encounter. There are no discontinued medications. Return in about 4 months (around 3/9/2019) for dm. Call or return to clinic prn if these symptoms worsen or fail to improve as anticipated.       Zulma Mayen DO

## 2018-11-18 ASSESSMENT — ENCOUNTER SYMPTOMS
BLOOD IN STOOL: 0
ABDOMINAL PAIN: 0
DIARRHEA: 0
SHORTNESS OF BREATH: 0

## 2018-11-28 ENCOUNTER — OFFICE VISIT (OUTPATIENT)
Dept: FAMILY MEDICINE CLINIC | Age: 58
End: 2018-11-28
Payer: COMMERCIAL

## 2018-11-28 VITALS
HEIGHT: 68 IN | TEMPERATURE: 97.6 F | BODY MASS INDEX: 40.77 KG/M2 | DIASTOLIC BLOOD PRESSURE: 78 MMHG | RESPIRATION RATE: 14 BRPM | HEART RATE: 76 BPM | WEIGHT: 269 LBS | SYSTOLIC BLOOD PRESSURE: 118 MMHG

## 2018-11-28 DIAGNOSIS — L01.00 IMPETIGO: ICD-10-CM

## 2018-11-28 DIAGNOSIS — L21.9 SEBORRHEIC DERMATITIS: Primary | ICD-10-CM

## 2018-11-28 PROCEDURE — G8484 FLU IMMUNIZE NO ADMIN: HCPCS | Performed by: FAMILY MEDICINE

## 2018-11-28 PROCEDURE — 2022F DILAT RTA XM EVC RTNOPTHY: CPT | Performed by: FAMILY MEDICINE

## 2018-11-28 PROCEDURE — G8427 DOCREV CUR MEDS BY ELIG CLIN: HCPCS | Performed by: FAMILY MEDICINE

## 2018-11-28 PROCEDURE — 3017F COLORECTAL CA SCREEN DOC REV: CPT | Performed by: FAMILY MEDICINE

## 2018-11-28 PROCEDURE — 1036F TOBACCO NON-USER: CPT | Performed by: FAMILY MEDICINE

## 2018-11-28 PROCEDURE — 3044F HG A1C LEVEL LT 7.0%: CPT | Performed by: FAMILY MEDICINE

## 2018-11-28 PROCEDURE — 99213 OFFICE O/P EST LOW 20 MIN: CPT | Performed by: FAMILY MEDICINE

## 2018-11-28 PROCEDURE — G8417 CALC BMI ABV UP PARAM F/U: HCPCS | Performed by: FAMILY MEDICINE

## 2018-11-28 RX ORDER — CLINDAMYCIN HYDROCHLORIDE 300 MG/1
300 CAPSULE ORAL 3 TIMES DAILY
Qty: 30 CAPSULE | Refills: 0 | Status: SHIPPED | OUTPATIENT
Start: 2018-11-28 | End: 2018-12-08

## 2018-11-28 RX ORDER — TRIAMCINOLONE ACETONIDE 1 MG/G
CREAM TOPICAL
Qty: 15 G | Refills: 2 | Status: SHIPPED | OUTPATIENT
Start: 2018-11-28

## 2018-11-28 NOTE — PROGRESS NOTES
deviation or nasal septal hematoma. No epistaxis. No foreign bodies. Right sinus exhibits no maxillary sinus tenderness. Left sinus exhibits no maxillary sinus tenderness. Mouth/Throat: Oropharynx is clear and moist. No uvula swelling. Scaling,red m-p rash; no ulcers/crusting; no lesions  Minor lichenification   Neck: No JVD present. Carotid bruit is not present. No thyroid mass and no thyromegaly present. Pulmonary/Chest: No respiratory distress. She has no wheezes. She has no rhonchi. She has no rales. Lymphadenopathy:        Head (right side): No submental and no submandibular adenopathy present. Head (left side): No submental and no submandibular adenopathy present. Right cervical: No posterior cervical adenopathy present. Left cervical: No posterior cervical adenopathy present.   ;  Assessment & Plan    Diagnosis Orders   1. Seborrheic dermatitis,infected,facial  triamcinolone (KENALOG) 0.1 % cream   2. Uncontrolled type 2 diabetes mellitus with insulin therapy (HCC)  insulin aspart protamine-insulin aspart (NOVOLOG MIX 70/30 FLEXPEN) (70-30) 100 UNIT/ML injection   3. Impetigo,early  clindamycin (CLEOCIN) 300 MG capsule     No orders of the defined types were placed in this encounter. Orders Placed This Encounter   Medications    insulin aspart protamine-insulin aspart (NOVOLOG MIX 70/30 FLEXPEN) (70-30) 100 UNIT/ML injection     Sig: Inject 20 Units into the skin 2 times daily (with meals)     Dispense:  5 pen     Refill:  5    clindamycin (CLEOCIN) 300 MG capsule     Sig: Take 1 capsule by mouth 3 times daily for 10 days Take 1 cap tid     Dispense:  30 capsule     Refill:  0    triamcinolone (KENALOG) 0.1 % cream     Sig: Apply topically 2 times daily.      Dispense:  15 g     Refill:  2   cool compresses and call if HA or advancing rash/agrees  Alternate kenalog w/ bactroban cream qid  Medications Discontinued During This Encounter   Medication Reason    insulin aspart

## 2018-12-05 ASSESSMENT — ENCOUNTER SYMPTOMS
VOICE CHANGE: 0
EYE PAIN: 0
RHINORRHEA: 1
SINUS PAIN: 0
PHOTOPHOBIA: 0
EYE REDNESS: 0
SORE THROAT: 0
COUGH: 0
EYE DISCHARGE: 0
TROUBLE SWALLOWING: 0
SINUS PRESSURE: 0

## 2018-12-08 ENCOUNTER — PATIENT MESSAGE (OUTPATIENT)
Dept: FAMILY MEDICINE CLINIC | Age: 58
End: 2018-12-08

## 2018-12-13 DIAGNOSIS — E11.9 CONTROLLED TYPE 2 DIABETES MELLITUS WITH INSULIN THERAPY (HCC): ICD-10-CM

## 2018-12-13 DIAGNOSIS — Z79.4 CONTROLLED TYPE 2 DIABETES MELLITUS WITH INSULIN THERAPY (HCC): ICD-10-CM

## 2018-12-13 LAB
ANION GAP SERPL CALCULATED.3IONS-SCNC: 14 MEQ/L (ref 7–13)
BUN BLDV-MCNC: 13 MG/DL (ref 6–20)
CALCIUM SERPL-MCNC: 9.4 MG/DL (ref 8.6–10.2)
CHLORIDE BLD-SCNC: 101 MEQ/L (ref 98–107)
CO2: 25 MEQ/L (ref 22–29)
CREAT SERPL-MCNC: 0.76 MG/DL (ref 0.5–0.9)
GFR AFRICAN AMERICAN: >60
GFR NON-AFRICAN AMERICAN: >60
GLUCOSE BLD-MCNC: 143 MG/DL (ref 74–109)
HBA1C MFR BLD: 6.7 % (ref 4.8–5.9)
POTASSIUM SERPL-SCNC: 4.8 MEQ/L (ref 3.5–5.1)
SODIUM BLD-SCNC: 140 MEQ/L (ref 132–144)

## 2019-02-01 ENCOUNTER — PATIENT MESSAGE (OUTPATIENT)
Dept: FAMILY MEDICINE CLINIC | Age: 59
End: 2019-02-01

## 2019-02-01 DIAGNOSIS — E11.42 DM TYPE 2 WITH DIABETIC PERIPHERAL NEUROPATHY (HCC): ICD-10-CM

## 2019-02-01 RX ORDER — INSULIN LISPRO 100 [IU]/ML
36 INJECTION, SUSPENSION SUBCUTANEOUS 2 TIMES DAILY WITH MEALS
Qty: 5 PEN | Refills: 3 | Status: SHIPPED | OUTPATIENT
Start: 2019-02-01

## 2019-02-01 RX ORDER — GABAPENTIN 300 MG/1
CAPSULE ORAL
Qty: 90 CAPSULE | Refills: 5 | Status: SHIPPED | OUTPATIENT
Start: 2019-02-01 | End: 2019-03-05

## 2019-03-13 ENCOUNTER — OFFICE VISIT (OUTPATIENT)
Dept: FAMILY MEDICINE CLINIC | Age: 59
End: 2019-03-13
Payer: MEDICARE

## 2019-03-13 VITALS
SYSTOLIC BLOOD PRESSURE: 124 MMHG | RESPIRATION RATE: 16 BRPM | WEIGHT: 242 LBS | DIASTOLIC BLOOD PRESSURE: 86 MMHG | TEMPERATURE: 96.6 F | HEART RATE: 82 BPM | BODY MASS INDEX: 36.68 KG/M2 | HEIGHT: 68 IN

## 2019-03-13 DIAGNOSIS — I10 ESSENTIAL HYPERTENSION: ICD-10-CM

## 2019-03-13 DIAGNOSIS — Z79.4 TYPE 2 DIABETES MELLITUS WITHOUT COMPLICATION, WITH LONG-TERM CURRENT USE OF INSULIN (HCC): ICD-10-CM

## 2019-03-13 DIAGNOSIS — B96.89 ACUTE BACTERIAL SINUSITIS: Primary | ICD-10-CM

## 2019-03-13 DIAGNOSIS — J01.90 ACUTE BACTERIAL SINUSITIS: Primary | ICD-10-CM

## 2019-03-13 DIAGNOSIS — D70.2 DRUG-INDUCED LEUKOPENIA (HCC): ICD-10-CM

## 2019-03-13 DIAGNOSIS — E11.9 TYPE 2 DIABETES MELLITUS WITHOUT COMPLICATION, WITH LONG-TERM CURRENT USE OF INSULIN (HCC): ICD-10-CM

## 2019-03-13 PROCEDURE — G8484 FLU IMMUNIZE NO ADMIN: HCPCS | Performed by: FAMILY MEDICINE

## 2019-03-13 PROCEDURE — 1036F TOBACCO NON-USER: CPT | Performed by: FAMILY MEDICINE

## 2019-03-13 PROCEDURE — 2022F DILAT RTA XM EVC RTNOPTHY: CPT | Performed by: FAMILY MEDICINE

## 2019-03-13 PROCEDURE — 3046F HEMOGLOBIN A1C LEVEL >9.0%: CPT | Performed by: FAMILY MEDICINE

## 2019-03-13 PROCEDURE — 3017F COLORECTAL CA SCREEN DOC REV: CPT | Performed by: FAMILY MEDICINE

## 2019-03-13 PROCEDURE — G8427 DOCREV CUR MEDS BY ELIG CLIN: HCPCS | Performed by: FAMILY MEDICINE

## 2019-03-13 PROCEDURE — 99213 OFFICE O/P EST LOW 20 MIN: CPT | Performed by: FAMILY MEDICINE

## 2019-03-13 PROCEDURE — G8417 CALC BMI ABV UP PARAM F/U: HCPCS | Performed by: FAMILY MEDICINE

## 2019-03-13 RX ORDER — AMOXICILLIN AND CLAVULANATE POTASSIUM 875; 125 MG/1; MG/1
1 TABLET, FILM COATED ORAL 2 TIMES DAILY
Qty: 20 TABLET | Refills: 0 | Status: SHIPPED | OUTPATIENT
Start: 2019-03-13 | End: 2019-03-23

## 2019-03-13 RX ORDER — GLIPIZIDE 10 MG/1
TABLET ORAL
Qty: 90 TABLET | Refills: 5 | Status: SHIPPED | OUTPATIENT
Start: 2019-03-13

## 2019-03-13 RX ORDER — FERROUS SULFATE 325(65) MG
325 TABLET ORAL
COMMUNITY

## 2019-03-13 ASSESSMENT — PATIENT HEALTH QUESTIONNAIRE - PHQ9
2. FEELING DOWN, DEPRESSED OR HOPELESS: 0
1. LITTLE INTEREST OR PLEASURE IN DOING THINGS: 0
SUM OF ALL RESPONSES TO PHQ QUESTIONS 1-9: 0
SUM OF ALL RESPONSES TO PHQ QUESTIONS 1-9: 0
SUM OF ALL RESPONSES TO PHQ9 QUESTIONS 1 & 2: 0

## 2019-03-20 ASSESSMENT — ENCOUNTER SYMPTOMS
RHINORRHEA: 1
COUGH: 0
ABDOMINAL PAIN: 0
SHORTNESS OF BREATH: 0
SINUS PRESSURE: 0
SINUS PAIN: 1

## 2019-03-26 ENCOUNTER — TELEPHONE (OUTPATIENT)
Dept: FAMILY MEDICINE CLINIC | Age: 59
End: 2019-03-26

## 2019-07-03 ENCOUNTER — TELEPHONE (OUTPATIENT)
Dept: PHARMACY | Facility: CLINIC | Age: 59
End: 2019-07-03

## 2023-01-26 PROBLEM — M19.049 OSTEOARTHRITIS, HAND: Status: ACTIVE | Noted: 2023-01-26

## 2023-01-26 PROBLEM — L02.612 ABSCESS OF LEFT FOOT EXCLUDING TOES: Status: ACTIVE | Noted: 2023-01-26

## 2023-01-26 PROBLEM — E11.42 DM TYPE 2 WITH DIABETIC PERIPHERAL NEUROPATHY (MULTI): Status: ACTIVE | Noted: 2023-01-26

## 2023-01-26 PROBLEM — M32.19: Status: ACTIVE | Noted: 2023-01-26

## 2023-01-26 PROBLEM — I10 BENIGN HYPERTENSION: Status: ACTIVE | Noted: 2023-01-26

## 2023-01-26 PROBLEM — E66.09 OBESITY DUE TO EXCESS CALORIES WITHOUT SERIOUS COMORBIDITY: Status: ACTIVE | Noted: 2023-01-26

## 2023-01-26 PROBLEM — J32.9 SINOBRONCHITIS: Status: ACTIVE | Noted: 2023-01-26

## 2023-01-26 PROBLEM — E66.9 OBESITY (BMI 35.0-39.9 WITHOUT COMORBIDITY): Status: ACTIVE | Noted: 2023-01-26

## 2023-01-26 PROBLEM — M25.519 SHOULDER PAIN: Status: ACTIVE | Noted: 2023-01-26

## 2023-01-26 PROBLEM — D72.819 CHRONIC LEUKOPENIA: Status: ACTIVE | Noted: 2023-01-26

## 2023-01-26 PROBLEM — H69.91 DYSFUNCTION OF RIGHT EUSTACHIAN TUBE: Status: ACTIVE | Noted: 2023-01-26

## 2023-01-26 PROBLEM — H65.01 NON-RECURRENT ACUTE SEROUS OTITIS MEDIA OF RIGHT EAR: Status: ACTIVE | Noted: 2023-01-26

## 2023-01-26 PROBLEM — D72.819: Status: ACTIVE | Noted: 2023-01-26

## 2023-01-26 PROBLEM — M75.41 IMPINGEMENT SYNDROME OF RIGHT SHOULDER: Status: ACTIVE | Noted: 2023-01-26

## 2023-01-26 PROBLEM — M87.9: Status: ACTIVE | Noted: 2023-01-26

## 2023-01-26 PROBLEM — H93.11 SUBJECTIVE TINNITUS OF RIGHT EAR: Status: ACTIVE | Noted: 2023-01-26

## 2023-01-26 PROBLEM — M75.51 ACUTE BURSITIS OF RIGHT SHOULDER: Status: ACTIVE | Noted: 2023-01-26

## 2023-01-26 PROBLEM — M14.679 CHARCOT'S JOINT, UNSPECIFIED ANKLE AND FOOT: Status: ACTIVE | Noted: 2023-01-26

## 2023-01-26 PROBLEM — S86.019A: Status: ACTIVE | Noted: 2023-01-26

## 2023-01-26 PROBLEM — J40 SINOBRONCHITIS: Status: ACTIVE | Noted: 2023-01-26

## 2023-01-26 PROBLEM — M25.511 PAIN IN JOINT OF RIGHT SHOULDER: Status: ACTIVE | Noted: 2023-01-26

## 2023-01-26 RX ORDER — ALBUTEROL SULFATE 90 UG/1
2 AEROSOL, METERED RESPIRATORY (INHALATION) EVERY 6 HOURS PRN
COMMUNITY
Start: 2020-12-22

## 2023-01-26 RX ORDER — MULTIVIT WITH MINERALS/HERBS
1 TABLET ORAL DAILY
COMMUNITY
End: 2023-03-02 | Stop reason: ENTERED-IN-ERROR

## 2023-01-26 RX ORDER — THIAMINE HCL 250 MG
250 TABLET ORAL EVERY OTHER DAY
COMMUNITY
Start: 2020-12-22

## 2023-01-26 RX ORDER — ACETAMINOPHEN, DEXTROMETHORPHAN HBR, DOXYLAMINE SUCCINATE, PHENYLEPHRINE HCL 650; 20; 12.5; 1 MG/30ML; MG/30ML; MG/30ML; MG/30ML
1 SOLUTION ORAL DAILY
COMMUNITY

## 2023-01-26 RX ORDER — FUROSEMIDE 20 MG/1
20 TABLET ORAL DAILY
COMMUNITY
Start: 2021-02-25 | End: 2023-05-10 | Stop reason: SDUPTHER

## 2023-01-26 RX ORDER — CHOLECALCIFEROL (VITAMIN D3) 125 MCG
125 CAPSULE ORAL
COMMUNITY

## 2023-01-26 RX ORDER — METFORMIN HYDROCHLORIDE 1000 MG/1
1000 TABLET ORAL 2 TIMES DAILY
COMMUNITY
End: 2023-03-10 | Stop reason: SDUPTHER

## 2023-01-26 RX ORDER — LANOLIN ALCOHOL/MO/W.PET/CERES
CREAM (GRAM) TOPICAL
COMMUNITY

## 2023-01-26 RX ORDER — INSULIN LISPRO 100 [IU]/ML
44 INJECTION, SUSPENSION SUBCUTANEOUS 2 TIMES DAILY
COMMUNITY
Start: 2020-12-22 | End: 2023-08-16

## 2023-01-26 RX ORDER — HYDROXYCHLOROQUINE SULFATE 200 MG/1
200 TABLET, FILM COATED ORAL
COMMUNITY
End: 2024-06-04 | Stop reason: SDUPTHER

## 2023-01-26 RX ORDER — MULTIVIT-MIN/FERROUS FUMARATE 15 MG
1 TABLET ORAL DAILY
COMMUNITY
Start: 2020-06-30

## 2023-03-10 ENCOUNTER — OFFICE VISIT (OUTPATIENT)
Dept: PRIMARY CARE | Facility: CLINIC | Age: 63
End: 2023-03-10
Payer: MEDICARE

## 2023-03-10 VITALS
HEIGHT: 68 IN | OXYGEN SATURATION: 98 % | BODY MASS INDEX: 37.44 KG/M2 | RESPIRATION RATE: 14 BRPM | HEART RATE: 81 BPM | WEIGHT: 247 LBS | TEMPERATURE: 96.8 F | SYSTOLIC BLOOD PRESSURE: 116 MMHG | DIASTOLIC BLOOD PRESSURE: 74 MMHG

## 2023-03-10 DIAGNOSIS — D72.819 CHRONIC LEUKOPENIA: ICD-10-CM

## 2023-03-10 DIAGNOSIS — E11.42 DM TYPE 2 WITH DIABETIC PERIPHERAL NEUROPATHY (MULTI): ICD-10-CM

## 2023-03-10 DIAGNOSIS — D72.819 SYSTEMIC LUPUS ERYTHEMATOSUS (SLE) WITH LEUKOPENIA (MULTI): ICD-10-CM

## 2023-03-10 DIAGNOSIS — M14.679 CHARCOT'S JOINT, UNSPECIFIED ANKLE AND FOOT: Primary | ICD-10-CM

## 2023-03-10 DIAGNOSIS — M32.19 SYSTEMIC LUPUS ERYTHEMATOSUS (SLE) WITH LEUKOPENIA (MULTI): ICD-10-CM

## 2023-03-10 DIAGNOSIS — E66.9 OBESITY (BMI 35.0-39.9 WITHOUT COMORBIDITY): ICD-10-CM

## 2023-03-10 PROBLEM — S86.019A: Status: RESOLVED | Noted: 2023-01-26 | Resolved: 2023-03-10

## 2023-03-10 PROBLEM — I10 BENIGN HYPERTENSION: Status: RESOLVED | Noted: 2023-01-26 | Resolved: 2023-03-10

## 2023-03-10 PROBLEM — L02.612 ABSCESS OF LEFT FOOT EXCLUDING TOES: Status: RESOLVED | Noted: 2023-01-26 | Resolved: 2023-03-10

## 2023-03-10 PROCEDURE — 1036F TOBACCO NON-USER: CPT | Performed by: FAMILY MEDICINE

## 2023-03-10 PROCEDURE — 3078F DIAST BP <80 MM HG: CPT | Performed by: FAMILY MEDICINE

## 2023-03-10 PROCEDURE — 3074F SYST BP LT 130 MM HG: CPT | Performed by: FAMILY MEDICINE

## 2023-03-10 PROCEDURE — 99214 OFFICE O/P EST MOD 30 MIN: CPT | Performed by: FAMILY MEDICINE

## 2023-03-10 RX ORDER — METFORMIN HYDROCHLORIDE 1000 MG/1
1000 TABLET ORAL 2 TIMES DAILY
Qty: 180 TABLET | Refills: 3 | Status: SHIPPED | OUTPATIENT
Start: 2023-03-10 | End: 2023-11-14 | Stop reason: SDUPTHER

## 2023-03-10 ASSESSMENT — ENCOUNTER SYMPTOMS
DEPRESSION: 0
OCCASIONAL FEELINGS OF UNSTEADINESS: 0

## 2023-03-10 ASSESSMENT — PATIENT HEALTH QUESTIONNAIRE - PHQ9
SUM OF ALL RESPONSES TO PHQ9 QUESTIONS 1 AND 2: 0
1. LITTLE INTEREST OR PLEASURE IN DOING THINGS: NOT AT ALL
2. FEELING DOWN, DEPRESSED OR HOPELESS: NOT AT ALL

## 2023-03-10 NOTE — PROGRESS NOTES
Subjective   Patient ID: Debra Marroquin is a 62 y.o. female who presents for Diabetes (5 month follow up).  HPI  62-year-old female with a history of systemic lupus with secondary leukopenia is here to recheck on her diabetes does take metformin as milligrams twice a day as well as her 40 to 60 units of Humalog 75/25 twice a day.  Diet is fairly good stable otherwise her A1c last autumn was 5.8 and recent on her sugar was 116 with normal kidney functions and liver functions.  White count was 3300 which has been an average for her.  She is still taking Plaquenil will be seeing her new rheumatologist soon to review her rheumatoid medicines.  Does take a left foot brace with forward of her foot because of Charcot joint no pain obviously but definite the intermittent swelling toward the end of the day does take Lasix 20 mg daily which is helped her pretibial edema.  Otherwise no other blood pressure medicines use cholesterols been always excellent  Denies any change in visual acuity or foot examination.  Supplemental B12 and vitamin D with history of B12 and B vitamin D deficiency.  Chronic meds reviewed no reported side effects.  No resting or exertional chest pain shortness of palpitations or new GI- issues.  Stable  Review of Systems   Constitutional:  Negative for appetite change and fever.   HENT:  Positive for rhinorrhea. Negative for sinus pressure and sore throat.    Eyes:  Negative for visual disturbance.   Respiratory:  Negative for cough, chest tightness and shortness of breath.    Cardiovascular:  Negative for palpitations and leg swelling.   Gastrointestinal:  Negative for abdominal pain, blood in stool and nausea.   Genitourinary:  Positive for frequency. Negative for dysuria, flank pain and hematuria.   Musculoskeletal:  Positive for arthralgias, back pain, gait problem and joint swelling. Negative for neck pain.   Skin:  Negative for rash.   Neurological:  Positive for weakness and numbness. Negative  "for headaches.   Psychiatric/Behavioral:  Negative for confusion, dysphoric mood and suicidal ideas.        Objective   /74 (BP Location: Right arm, Patient Position: Sitting, BP Cuff Size: Large adult)   Pulse 81   Temp 36 °C (96.8 °F) (Temporal)   Resp 14   Ht 1.727 m (5' 8\")   Wt 112 kg (247 lb)   SpO2 98%   BMI 37.56 kg/m²     Collected: 11/21/22 0843  Final result   Glucose 112 High  mg/dL Anion Gap 11 mmol/L   Sodium 138 mmol/L Urea Nitrogen 18 mg/dL   Potassium 4.2 mmol/L Creatinine 1.03 mg/dL   Chloride 100 mmol/L GFR Female 62 mL/min/1.73m2    Bicarbonate 31 mmol/L Calcium 9.5 mg/dL   11/21/22 1619CBC and Auto Differential   Collected: 11/21/22 0843  Final result   WBC 3.3 Low  x10E9/L Lymphocytes % 34.6 %   RBC 4.04 x10E12/L Monocytes % 7.6 %   Hemoglobin 12.4 g/dL Eosinophils % 2.1 %   Hematocrit 38.3 % Basophils % 0.6 %   MCV 95 fL Neutrophils Absolute 1.79 x10E9/L   MCHC 32.4 g/dL Lymphocytes Absolute 1.13 Low  x10E9/L   Platelets 95 Low  x10E9/L Monocytes Absolute 0.25 x10E9/L   RDW 14.7 High  % Eosinophils Absolute 0.07 x10E9/L   Neutrophils % 54.8 % Basophils Absolute 0.02 x10E9/L   Immature Granulocytes %, Automated 0.3 %      A1C  Order: [id number]  Status: Final result       Visible to patient: Yes (seen)    0 Result Notes       1 HM Topic             Component Ref Range & Units 7 mo ago  (8/16/22) 11 mo ago  (4/1/22) 1 yr ago  (11/18/21) 1 yr ago  (7/15/21) 1 yr ago  (3/31/21) 2 yr ago  (12/4/20) 2 yr ago  (6/29/20)   Hemoglobin A1C % 5.8 Abnormal  6.2 Abnormal  CM 5.4 CM 5.5 CM 7.2 CM 6.7 CM 6.4 CM   Comment:      Diagnos              Physical Exam  Vital signs reviewed and are normal.  Eyes eyes show PERRLA bilaterally sclera is clear no nystagmus or parable edema  ENT-- minimal turbinate swelling otherwise no thick exudate or polyps TMs scarred retracted without redness oral exam is benign ulcerations or masses  Neck neck is supple out masses adenopathy bruits or " rigidity  Cardiovascular-one half over 6 is logics murmur otherwise no diastolic murmurs, S3 gallop or ectopy.  No JVD  Respiratory-chest clear anteriorly and posteriorly  Abdominal no organomegaly mass or rebound no CVA tenderness  Peripheral vascular distal pulses are intact still some reduced sensation to vibration in the left foot much more than the right foot.  Musculoskeletal definite weakness of dorsiflexion of the left foot mild edema around the ankle joint but otherwise no palpable tenderness no break in the skin no color change of the lower extremities minimal tenderness the anterior aspect of both shoulders but range of motion is fairly stable at the medial SI joints bilaterally but no midline lumbar tenderness no posterior rashes no CVA tenderness noted skin no rashes petechiae or jaundice  Mood mood is stable without anxiety depressive or cognitive issues  Meds reviewed has lab pending for A1c lipid CMP.      Assessment/Plan   Problem List Items Addressed This Visit          Nervous    DM type 2 with diabetic peripheral neuropathy (CMS/HCC)   Follow-up with a new rheumatologist soon she will get her fasting BMP A1c and lipid panel before next visit in 2 to 3 months.  Continue good diet as well as low-salt low carbohydrate and low-fat diet.  We will check with her rheumatologist regarding her chronic Plaquenil and doing well leukopenia otherwise leukocyte counts usually range from 3-4000.  Checks are relatively higher recently and will increase her evening 75/25 Humalog to 10 units.  Continue frequent small meals increasing liquids advised continue annual eye nightly foot examination  @discharge  The above diagnosis and treatment plan was discussed with the patient patient will continue appropriate diet and exercise as reviewed  Patient will recheck earlier if any interval problems of significance or clinical worsening of the above problems.  Agrees above surveillance.  All question were addressed  regarding above meds

## 2023-03-14 ENCOUNTER — LAB (OUTPATIENT)
Dept: LAB | Facility: LAB | Age: 63
End: 2023-03-14
Payer: MEDICARE

## 2023-03-14 DIAGNOSIS — E11.42 DM TYPE 2 WITH DIABETIC PERIPHERAL NEUROPATHY (MULTI): ICD-10-CM

## 2023-03-14 LAB
ALANINE AMINOTRANSFERASE (SGPT) (U/L) IN SER/PLAS: 20 U/L (ref 7–45)
ALBUMIN (G/DL) IN SER/PLAS: 4.3 G/DL (ref 3.4–5)
ALKALINE PHOSPHATASE (U/L) IN SER/PLAS: 66 U/L (ref 33–136)
ANION GAP IN SER/PLAS: 12 MMOL/L (ref 10–20)
ANTI-DNA (DS): 37 IU/ML
ANTICARDIOLIPIN IGA ANTIBODY: 9.6 APL U/ML (ref 0–20)
ANTICARDIOLIPIN IGG ANTIBODY: <1.6 GPL U/ML (ref 0–20)
ANTICARDIOLIPIN IGM ANTIBODY: 0.3 MPL U/ML (ref 0–20)
ASPARTATE AMINOTRANSFERASE (SGOT) (U/L) IN SER/PLAS: 30 U/L (ref 9–39)
BASOPHILS (10*3/UL) IN BLOOD BY AUTOMATED COUNT: 0.03 X10E9/L (ref 0–0.1)
BASOPHILS/100 LEUKOCYTES IN BLOOD BY AUTOMATED COUNT: 0.8 % (ref 0–2)
BILIRUBIN TOTAL (MG/DL) IN SER/PLAS: 1.1 MG/DL (ref 0–1.2)
C REACTIVE PROTEIN (MG/L) IN SER/PLAS: 0.19 MG/DL
CALCIUM (MG/DL) IN SER/PLAS: 9.7 MG/DL (ref 8.6–10.3)
CARBON DIOXIDE, TOTAL (MMOL/L) IN SER/PLAS: 31 MMOL/L (ref 21–32)
CHLORIDE (MMOL/L) IN SER/PLAS: 99 MMOL/L (ref 98–107)
COMPLEMENT C3 (MG/DL) IN SER/PLAS: 119 MG/DL (ref 87–200)
CREATININE (MG/DL) IN SER/PLAS: 1 MG/DL (ref 0.5–1.05)
CREATININE (MG/DL) IN URINE: 24.4 MG/DL (ref 20–320)
EOSINOPHILS (10*3/UL) IN BLOOD BY AUTOMATED COUNT: 0.06 X10E9/L (ref 0–0.7)
EOSINOPHILS/100 LEUKOCYTES IN BLOOD BY AUTOMATED COUNT: 1.6 % (ref 0–6)
ERYTHROCYTE DISTRIBUTION WIDTH (RATIO) BY AUTOMATED COUNT: 14.6 % (ref 11.5–14.5)
ERYTHROCYTE MEAN CORPUSCULAR HEMOGLOBIN CONCENTRATION (G/DL) BY AUTOMATED: 33.8 G/DL (ref 32–36)
ERYTHROCYTE MEAN CORPUSCULAR VOLUME (FL) BY AUTOMATED COUNT: 91 FL (ref 80–100)
ERYTHROCYTES (10*6/UL) IN BLOOD BY AUTOMATED COUNT: 4.28 X10E12/L (ref 4–5.2)
ESTIMATED AVERAGE GLUCOSE FOR HBA1C: 123 MG/DL
GFR FEMALE: 64 ML/MIN/1.73M2
GLUCOSE (MG/DL) IN SER/PLAS: 181 MG/DL (ref 74–99)
HEMATOCRIT (%) IN BLOOD BY AUTOMATED COUNT: 39 % (ref 36–46)
HEMOGLOBIN (G/DL) IN BLOOD: 13.2 G/DL (ref 12–16)
HEMOGLOBIN A1C/HEMOGLOBIN TOTAL IN BLOOD: 5.9 %
IMMATURE GRANULOCYTES/100 LEUKOCYTES IN BLOOD BY AUTOMATED COUNT: 0.3 % (ref 0–0.9)
LEUKOCYTES (10*3/UL) IN BLOOD BY AUTOMATED COUNT: 3.7 X10E9/L (ref 4.4–11.3)
LYMPHOCYTES (10*3/UL) IN BLOOD BY AUTOMATED COUNT: 0.75 X10E9/L (ref 1.2–4.8)
LYMPHOCYTES/100 LEUKOCYTES IN BLOOD BY AUTOMATED COUNT: 20.1 % (ref 13–44)
MONOCYTES (10*3/UL) IN BLOOD BY AUTOMATED COUNT: 0.25 X10E9/L (ref 0.1–1)
MONOCYTES/100 LEUKOCYTES IN BLOOD BY AUTOMATED COUNT: 6.7 % (ref 2–10)
NEUTROPHILS (10*3/UL) IN BLOOD BY AUTOMATED COUNT: 2.64 X10E9/L (ref 1.2–7.7)
NEUTROPHILS/100 LEUKOCYTES IN BLOOD BY AUTOMATED COUNT: 70.5 % (ref 40–80)
PLATELETS (10*3/UL) IN BLOOD AUTOMATED COUNT: 93 X10E9/L (ref 150–450)
POTASSIUM (MMOL/L) IN SER/PLAS: 4 MMOL/L (ref 3.5–5.3)
PROTEIN (MG/DL) IN URINE: <4 MG/DL (ref 5–24)
PROTEIN TOTAL: 7.9 G/DL (ref 6.4–8.2)
PROTEIN/CREATININE (MG/MG) IN URINE: ABNORMAL MG/MG CREAT (ref 0–0.17)
SODIUM (MMOL/L) IN SER/PLAS: 138 MMOL/L (ref 136–145)
UREA NITROGEN (MG/DL) IN SER/PLAS: 13 MG/DL (ref 6–23)

## 2023-03-14 PROCEDURE — 83036 HEMOGLOBIN GLYCOSYLATED A1C: CPT

## 2023-03-14 PROCEDURE — 36415 COLL VENOUS BLD VENIPUNCTURE: CPT

## 2023-03-14 ASSESSMENT — ENCOUNTER SYMPTOMS
ABDOMINAL PAIN: 0
FLANK PAIN: 0
DYSURIA: 0
HEMATURIA: 0
CONFUSION: 0
NECK PAIN: 0
FEVER: 0
ARTHRALGIAS: 1
DYSPHORIC MOOD: 0
CHEST TIGHTNESS: 0
NUMBNESS: 1
SINUS PRESSURE: 0
HEADACHES: 0
SORE THROAT: 0
APPETITE CHANGE: 0
RHINORRHEA: 1
COUGH: 0
SHORTNESS OF BREATH: 0
BLOOD IN STOOL: 0
WEAKNESS: 1
NAUSEA: 0
JOINT SWELLING: 1
BACK PAIN: 1
PALPITATIONS: 0
FREQUENCY: 1

## 2023-03-15 LAB
ANA PATTERN: ABNORMAL
ANA TITER: ABNORMAL
ANTI-CENTROMERE: <0.2 AI
ANTI-CHROMATIN: 0.3 AI
ANTI-DNA (DS): 37 IU/ML
ANTI-JO-1 IGG: <0.2 AI
ANTI-NUCLEAR ANTIBODY (ANA): POSITIVE
ANTI-RIBOSOMAL P: <0.2 AI
ANTI-RNP: 0.2 AI
ANTI-SCL-70: <0.2 AI
ANTI-SM/RNP: <0.2 AI
ANTI-SM: <0.2 AI
ANTI-SSA: <0.2 AI
ANTI-SSB: <0.2 AI

## 2023-03-16 LAB
DILUTE RUSSELL VIPER VENOM TIME CONF: 1.16 RATIO
DILUTE RUSSELL VIPER VENOM TIME SCREEN: 0.91 RATIO
DILUTE RUSSELL VIPER VENOM TIME TEST RATIO: 0.79 RATIO
LUPUS ANTICOAGULANT INTERPRETATION: NORMAL
NORMALIZED SILICA CLOTTING TIME (RATIO) OF PPP: 0.77 RATIO
SILICA CLOTTING TIME CONFIRMATION: 1.32 RATIO
SILICA CLOTTING TIME SCREEN: 1.02 RATIO

## 2023-04-25 ENCOUNTER — OFFICE VISIT (OUTPATIENT)
Dept: PRIMARY CARE | Facility: CLINIC | Age: 63
End: 2023-04-25
Payer: MEDICARE

## 2023-04-25 VITALS
TEMPERATURE: 96.8 F | OXYGEN SATURATION: 97 % | BODY MASS INDEX: 38.19 KG/M2 | HEIGHT: 68 IN | WEIGHT: 252 LBS | SYSTOLIC BLOOD PRESSURE: 124 MMHG | DIASTOLIC BLOOD PRESSURE: 76 MMHG | RESPIRATION RATE: 16 BRPM | HEART RATE: 72 BPM

## 2023-04-25 DIAGNOSIS — D72.819 CHRONIC LEUKOPENIA: ICD-10-CM

## 2023-04-25 DIAGNOSIS — Z00.00 ROUTINE GENERAL MEDICAL EXAMINATION AT HEALTH CARE FACILITY: Primary | ICD-10-CM

## 2023-04-25 DIAGNOSIS — M32.19 SYSTEMIC LUPUS ERYTHEMATOSUS (SLE) WITH LEUKOPENIA (MULTI): ICD-10-CM

## 2023-04-25 DIAGNOSIS — D72.819 SYSTEMIC LUPUS ERYTHEMATOSUS (SLE) WITH LEUKOPENIA (MULTI): ICD-10-CM

## 2023-04-25 DIAGNOSIS — E11.42 DM TYPE 2 WITH DIABETIC PERIPHERAL NEUROPATHY (MULTI): ICD-10-CM

## 2023-04-25 PROCEDURE — 3078F DIAST BP <80 MM HG: CPT | Performed by: FAMILY MEDICINE

## 2023-04-25 PROCEDURE — 1036F TOBACCO NON-USER: CPT | Performed by: FAMILY MEDICINE

## 2023-04-25 PROCEDURE — 3074F SYST BP LT 130 MM HG: CPT | Performed by: FAMILY MEDICINE

## 2023-04-25 PROCEDURE — 99212 OFFICE O/P EST SF 10 MIN: CPT | Performed by: FAMILY MEDICINE

## 2023-04-25 PROCEDURE — 3044F HG A1C LEVEL LT 7.0%: CPT | Performed by: FAMILY MEDICINE

## 2023-04-25 ASSESSMENT — ACTIVITIES OF DAILY LIVING (ADL)
DRESSING: INDEPENDENT
DOING_HOUSEWORK: INDEPENDENT
MANAGING_FINANCES: INDEPENDENT
TAKING_MEDICATION: INDEPENDENT
GROCERY_SHOPPING: INDEPENDENT
BATHING: INDEPENDENT

## 2023-04-25 ASSESSMENT — PATIENT HEALTH QUESTIONNAIRE - PHQ9
2. FEELING DOWN, DEPRESSED OR HOPELESS: NOT AT ALL
1. LITTLE INTEREST OR PLEASURE IN DOING THINGS: NOT AT ALL
SUM OF ALL RESPONSES TO PHQ9 QUESTIONS 1 AND 2: 0

## 2023-04-25 ASSESSMENT — ENCOUNTER SYMPTOMS
DEPRESSION: 0
OCCASIONAL FEELINGS OF UNSTEADINESS: 0
LOSS OF SENSATION IN FEET: 0

## 2023-04-25 NOTE — PROGRESS NOTES
Subjective   Reason for Visit: Debra Marroquin is an 62 y.o. female here for a Medicare Wellness visit.     63yo  female with a history of type 2 diabetes on split dose of NPH twice a day and taking about 30 to 40 units in the morning and 45-50 in the evening still morning sugars range from 104-180.  Her recent A1c is good at 5.9 recent sugars 180 gratefully her kidney function is normal liver function are normal and above all her white count went from 3300 up to 3700 which is good for her  Is seeing a new rheumatologist I did review her rheumatoid profile with positive ELROY and definitely positive anti-DNA compatible with her systemic lupus syndrome Plaquenil remains to twice a day.  Patient otherwise takes Lasix 20 mg daily in addition to her metformin at twice a day and 1 baby aspirin  Recent sinobronchitis improved and no striking shortness of breath cough wheezing chest pain palpitations.  No new GI/ issues  Follows at podiatry because of chronic Charcot joint left foot greater than right with left foot splint.  Otherwise no new synovitis the upper or lower extremities was has been stable in the Plaquenil with no  eye problems.       Reviewed all medications by prescribing practitioner or clinical pharmacist (such as prescriptions, OTCs, herbal therapies and supplements) and documented in the medical record.    HPI please see above    Patient Care Team:  Michael Burnette DO as PCP - General  Michael Burnette DO as PCP - [employer] PCP     Review of Systems   Constitutional:  Negative for fatigue and fever.   Eyes:  Negative for visual disturbance.   Respiratory:  Negative for cough, choking and shortness of breath.    Cardiovascular:  Negative for chest pain, palpitations and leg swelling.   Gastrointestinal:  Negative for abdominal pain and blood in stool.   Genitourinary:  Positive for frequency. Negative for dysuria and hematuria.   Musculoskeletal:  Positive for arthralgias and myalgias.   Skin:   "Negative for rash.   Neurological:  Negative for dizziness and tremors.   Hematological:  Negative for adenopathy.   Psychiatric/Behavioral:  Negative for confusion, sleep disturbance and suicidal ideas.        Objective   Vitals:  /76 (BP Location: Right arm, Patient Position: Sitting, BP Cuff Size: Large adult)   Pulse 72   Temp 36 °C (96.8 °F) (Temporal)   Resp 16   Ht 1.727 m (5' 8\")   Wt 114 kg (252 lb)   SpO2 97%   BMI 38.32 kg/m²     03/16/23 1217Lupus Anticoag. With Interpretation[Estevez]   Collected: 03/14/23 1126  Final result   DRVVT SCREEN 0.91 RATIO SCT CONFIRMATION 1.32 RATIO   DRVVT CONFIRMATION 1.16 RATIO SCT TEST RATIO 0.77 RATIO   DRVVT TEST RATIO 0.79 RATIO Lupus Anticoagulant Interpretation SEE BELOW    SCT SCREEN 1.02 RATIO     03/15/23 1634ENA Panel   Collected: 03/14/23 1126  Final result   Anti-SM <0.2 AI  Anti-ARIANA-1 IgG <0.2 AI    Anti-RNP 0.2 AI  Anti-Chromatin 0.3 AI    Anti-SM/RNP <0.2 AI  Anti-Centromere <0.2 AI    Anti-SSA <0.2 AI  Anti-Ribosomal P <0.2 AI    Anti-SSB <0.2 AI  Anti-DNA (DS) 37.0 Abnormal  IU/mL    Anti-SCL-70 <0.2 AI      03/15/23 1634ANA with Reflex to TRESA   Collected: 03/14/23 1126  Final result   ANTI-NUCLEAR ANTIBODY (ELROY) POSITIVE Abnormal   ELROY Pattern HOMOGENEOUS   ELROY Titer 1:160     03/14/23 2244Anti-Cardiolipin Antibody (IgA, IgG, and IgM)   Collected: 03/14/23 1126  Final result   Anticardiolipin IgG <1.6 GPL U/mL  Anticardiolipin IgM 0.3 MPL U/mL    Anticardiolipin IgA 9.6 APL U/mL      03/14/23 2244Anti-DNA Antibody, Double-Stranded   Collected: 03/14/23 1126  Final result   Anti-DNA (DS) 37.0 Abnormal  IU/mL      03/14/23 2212C3 Complement   Collected: 03/14/23 1126  Final result   COMPLEMENT C3 (MG/DL) IN SER/PLAS 119 mg/dL     03/14/23 2138Hemoglobin A1c   Collected: 03/14/23 1125  Final result  Specimen: Blood, Venous   Hemoglobin A1C 5.9 Abnormal  %  Estimated Average Glucose 123 MG/DL   03/14/23 1743CBC and Auto Differential "   Collected: 03/14/23 1126  Final result   WBC 3.7 Low  x10E9/L Lymphocytes % 20.1 %   RBC 4.28 x10E12/L Monocytes % 6.7 %   Hemoglobin 13.2 g/dL Eosinophils % 1.6 %   Hematocrit 39.0 % Basophils % 0.8 %   MCV 91 fL Neutrophils Absolute 2.64 x10E9/L   MCHC 33.8 g/dL Lymphocytes Absolute 0.75 Low  x10E9/L   Platelets 93 Low  x10E9/L Monocytes Absolute 0.25 x10E9/L   RDW 14.6 High  % Eosinophils Absolute 0.06 x10E9/L   Neutrophils % 70.5 % Basophils Absolute 0.03 x10E9/L   Immature Granulocytes %, Automated 0.3 %        24.4 mg/dL     03/14/23 1703Comprehensive Metabolic Panel   Collected: 03/14/23 1126  Final result   Glucose 181 High  mg/dL GFR Female 64 mL/min/1.73m2    Sodium 138 mmol/L Calcium 9.7 mg/dL   Potassium 4.0 mmol/L Albumin 4.3 g/dL   Chloride 99 mmol/L Alkaline Phosphatase 66 U/L   Bicarbonate 31 mmol/L Total Protein 7.9 g/dL   Anion Gap 12 mmol/L AST 30 U/L   Urea Nitrogen 13 mg/dL Total Bilirubin 1.1 mg/dL   Creatinine 1.00 mg/dL ALT (SGPT) 20 U/L     Contains abnormal data CBC and Auto Differential  Order: [id number]  Status: Final result       Visible to patient: Yes (seen)    0 Result Notes   important suggestion  Newer results are available. Click to view them now.                  Component Ref Range & Units 5 mo ago  (11/21/22) 8 mo ago  (8/16/22) 1 yr ago  (11/12/21) 1 yr ago  (7/15/21) 1 yr ago  (5/10/21) 2 yr ago  (12/4/20) 2 yr ago  (8/4/20)   WBC 4.4 - 11.3 x10E9/L 3.3 Low  2.4 Low  5.0 3.0 Low  2.9 Low  3.3 Low  4.3 Low    RBC 4.00 - 5.20 x10E12/L 4.04 3.93 Low  4.18 4.26 3.94 Low  4.09 4.14   Hemoglobin 12.0 - 16.0 g/dL 12.4 11.6 Low  13.1 13.6 12.6 12.8 13.1   Hematocrit 36.0 - 46.0 % 38.3 36.8 40.3 40.4 37.8 38.9 37.6   MCV 80 - 100 fL 95 94 96 95 96 95 91   MCHC 32.0 - 36.0 g/dL 32.4 31.5 Low  32.5 33.7 33.3 32.9 34.8   Platelets 150 - 450 x10E9/L 95 Low  71 Low  128 Low  112 Low  89 Low  99 Low  112 Low    RDW 11.5 - 14.5 % 14.7 High  14.4 14.5 14.3 14.6 High  14.7 High  14.4    Neutrophils % 40.0 - 80.0 % 54.8 66.0 72.9 63.2 65.0 68.7 72.1   Immature Granulocytes %, Automated 0.0 - 0.9 % 0.3 0.4 CM 0.4 CM 0.3 CM 0.7 CM 0.3 CM 0.5 CM   Comment:  Immature Granulocyte Count (IG) includes promyelocytes,   myelocytes and metamyelocytes but does not include bands.   Percent differential counts (%) should be interpreted in the   context of the absolute cell counts (cells/L).   Lymphocytes % 13.0 - 44.0 % 34.6 21.3 16.7 24.5 21.6 20.1 18.0   Monocytes % 2.0 - 10.0 % 7.6 9.0 8.2 9.7 9.3 8.5 7.3   Eosinophils % 0.0 - 6.0 % 2.1 2.5 1.2 2.0 2.4 1.5 1.6   Basophils % 0.0 - 2.0 % 0.6 0.8 0.6 0.3 1.0 0.9 0.5   Neutrophils Absolute 1.20 - 7.70 x10E9/L 1.79 1.61 3.66 1.88 1.89 2.25 3.08   Lymphocytes Absolute 1.20 - 4.80 x10E9/L 1.13 Low  0.52 Low  0.84 Low  0.73 Low  0.63 Low  0.66 Low  0.77 Low    Monocytes Absolute 0.10 - 1.00 x10E9/L 0.25 0.22 0.41 0.29 0.27 0.28 0.31   Eosinophils Absolute 0.00 - 0.70 x10E9/L 0.07 0.06 0.06 0.06 0.07 0.05 0.07   Basophils Absolute 0.00 - 0.10 x10E9/L 0.02 0.02 0.03 0.01 0.03 0.03 0.02   Resulting Agency                Physical Exam  Adult signs reviewed and are normal  Neck neck is without masses adenopathy bruits or rigidity thyroid is normal  Cardiovascular-RSR without S3 gallop or ectopy soft grade 1/2 over 6 systolic extra murmur heard at the aortic area  Respiratory chest clear anteriorly and posteriorly without peripheral edema  Assessment/Plan   Problem List Items Addressed This Visit    None  And with her elevated sugar in the morning will increase to 60 units of NPH before supper and her usual 40 units before breakfast we will continue low-salt low carbohydrate and low-fat diet  Recheck in 3 months at which time check an office A1c  Continue follow-up with podiatry  The above Lasix as well as her statin therapy baby aspirin a day and Plaquenil  Continue follow-up with rheumatology for lupus syndrome  Happy her white count is elevated the best its been about  2 years.  Reviewed room lab with patient  Patient specifically after discussion declines both mammography and colonoscopy and FIT testing.  Pains up-to-date on immunizations and suggested shingles shot this summer  @discharge  The above diagnosis and treatment plan was discussed with the patient patient will continue appropriate diet and exercise as reviewed  Patient will recheck earlier if any interval problems of significance or clinical worsening of the above problems.  Agrees above surveillance.  All question were addressed regarding above meds

## 2023-04-29 PROBLEM — J40 SINOBRONCHITIS: Status: RESOLVED | Noted: 2023-01-26 | Resolved: 2023-04-29

## 2023-04-29 PROBLEM — H65.01 NON-RECURRENT ACUTE SEROUS OTITIS MEDIA OF RIGHT EAR: Status: RESOLVED | Noted: 2023-01-26 | Resolved: 2023-04-29

## 2023-04-29 PROBLEM — J32.9 SINOBRONCHITIS: Status: RESOLVED | Noted: 2023-01-26 | Resolved: 2023-04-29

## 2023-04-29 ASSESSMENT — ENCOUNTER SYMPTOMS
DYSURIA: 0
PALPITATIONS: 0
MYALGIAS: 1
SLEEP DISTURBANCE: 0
ADENOPATHY: 0
TREMORS: 0
COUGH: 0
CONFUSION: 0
FREQUENCY: 1
ARTHRALGIAS: 1
ABDOMINAL PAIN: 0
DIZZINESS: 0
SHORTNESS OF BREATH: 0
CHOKING: 0
HEMATURIA: 0
BLOOD IN STOOL: 0
FATIGUE: 0
FEVER: 0

## 2023-05-10 ENCOUNTER — OFFICE VISIT (OUTPATIENT)
Dept: PRIMARY CARE | Facility: CLINIC | Age: 63
End: 2023-05-10
Payer: MEDICARE

## 2023-05-10 ENCOUNTER — APPOINTMENT (OUTPATIENT)
Dept: PRIMARY CARE | Facility: CLINIC | Age: 63
End: 2023-05-10
Payer: MEDICARE

## 2023-05-10 VITALS
RESPIRATION RATE: 16 BRPM | TEMPERATURE: 96 F | DIASTOLIC BLOOD PRESSURE: 70 MMHG | HEIGHT: 68 IN | HEART RATE: 71 BPM | SYSTOLIC BLOOD PRESSURE: 114 MMHG | OXYGEN SATURATION: 98 % | BODY MASS INDEX: 38.49 KG/M2 | WEIGHT: 254 LBS

## 2023-05-10 DIAGNOSIS — I10 BENIGN HYPERTENSION: ICD-10-CM

## 2023-05-10 DIAGNOSIS — M32.19 SYSTEMIC LUPUS ERYTHEMATOSUS (SLE) WITH LEUKOPENIA (MULTI): ICD-10-CM

## 2023-05-10 DIAGNOSIS — E11.42 DM TYPE 2 WITH DIABETIC PERIPHERAL NEUROPATHY (MULTI): Primary | ICD-10-CM

## 2023-05-10 DIAGNOSIS — D72.819 SYSTEMIC LUPUS ERYTHEMATOSUS (SLE) WITH LEUKOPENIA (MULTI): ICD-10-CM

## 2023-05-10 PROCEDURE — 3044F HG A1C LEVEL LT 7.0%: CPT | Performed by: FAMILY MEDICINE

## 2023-05-10 PROCEDURE — 1036F TOBACCO NON-USER: CPT | Performed by: FAMILY MEDICINE

## 2023-05-10 PROCEDURE — 3078F DIAST BP <80 MM HG: CPT | Performed by: FAMILY MEDICINE

## 2023-05-10 PROCEDURE — 3074F SYST BP LT 130 MM HG: CPT | Performed by: FAMILY MEDICINE

## 2023-05-10 PROCEDURE — 99213 OFFICE O/P EST LOW 20 MIN: CPT | Performed by: FAMILY MEDICINE

## 2023-05-10 RX ORDER — FUROSEMIDE 20 MG/1
20 TABLET ORAL DAILY
Qty: 90 TABLET | Refills: 3 | Status: SHIPPED | OUTPATIENT
Start: 2023-05-10

## 2023-05-10 NOTE — PROGRESS NOTES
Subjective   Patient ID: Debra Marroquin is a 62 y.o. female who presents for Diabetes (2 month follow up ).  HPI  Pleasant 62-year-old female history of systemic lupus now followed by a new rheumatologist.  Did review recent the rheumatoid lab which showed persistent elevation of anti-DNA antibody as well as positive ELROY.  Gratefully her white count is improved but remains on Plaquenil 200 mg twice a day.  The patient takes her 75/25 Humalog but about 35 to 40 units the morning and 40 5 in the evening highest time day with sugars elevated are in the morning and will go up to 50 units before supper diabetic goals diabetic diet reviewed.  Otherwise active individual with her Charcot process of both ankles without any exertional chest pain short of breath palpitations or new GI- issues.  Mood stable  Patient is declined statins in the past  Reviewed low-salt low-fat low carbohydrate diet in addition her Lasix 20 mg in the morning.  Also remains on metformin 1000 mg twice a day.  Otherwise despite her elevated sugar her A1c has been less than 6.0 in the last 6 months  Lab Results   Component Value Date    HGBA1C 5.9 (A) 03/14/2023    HGBA1C 5.8 (A) 08/16/2022    HGBA1C 6.2 (A) 04/01/2022     Lab Results   Component Value Date    CREATININE 1.00 03/14/2023         Review of Systems   Constitutional:  Negative for fatigue and fever.   Eyes:  Negative for visual disturbance.   Respiratory:  Negative for cough, chest tightness and shortness of breath.    Cardiovascular:  Negative for chest pain, palpitations and leg swelling.   Gastrointestinal:  Negative for abdominal pain and blood in stool.   Genitourinary:  Negative for dysuria, frequency and hematuria.   Musculoskeletal:  Positive for arthralgias, back pain and myalgias.   Skin:  Negative for rash.   Neurological:  Negative for weakness and headaches.   Psychiatric/Behavioral:  Negative for behavioral problems, dysphoric mood, sleep disturbance and suicidal ideas.         Objective    Contains abnormal data ELROY with Reflex to TRESA  Order: [id number]  Status: Final result       Visible to patient: Yes (seen)    0 Result Notes       Component Ref Range & Units 2 mo ago   ANTI-NUCLEAR ANTIBODY (ELROY) NEGATIVE POSITIVE Abnormal    Comment:   The Antinuclear Antibody (ELROY) test was performed using    indirect immunofluorescence assay with HEp-2 cells slide.   ELROY Titer <1:80 1:160   ELROY Pattern  HOMOGENEOUS   Resulting Agency  [facility]                 Contains abnormal data Anti-DNA Antibody, Double-Stranded  Order: [id number]  Status: Final result       Visible to patient: Yes (seen)    0 Result Notes   important suggestion  Newer results are available. Click to view them now.            Component Ref Range & Units 2 mo ago  (3/14/23) 3 yr ago  (10/8/19) 3 yr ago  (6/4/19)   Anti-DNA (DS) IU/mL 37.0 Abnormal  15.0 Abnormal  CM 18.0 Abnormal  CM   Comment: REF VALUES  NEGATIVE:    <= 4 IU/ML  EQUIVOCAL:   5- 9 IU/ML  POSITIV       Contains abnormal data Comprehensive Metabolic Panel  Order: [id number]  Status: Final result       Visible to patient: Yes (seen)    0 Result Notes              Component Ref Range & Units 2 mo ago  (3/14/23) 5 mo ago  (11/21/22) 9 mo ago  (8/16/22) 1 yr ago  (4/1/22) 1 yr ago  (11/18/21) 1 yr ago  (11/12/21) 1 yr ago  (11/12/21) 1 yr ago  (11/12/21)   Glucose 74 - 99 mg/dL 181 High  112 High  151 High  141 High  130 High       Sodium 136 - 145 mmol/L 138 138 140 140 139      Potassium 3.5 - 5.3 mmol/L 4.0 4.2 4.0 4.1 3.9      Chloride 98 - 107 mmol/L 99 100 101 102 102      Bicarbonate 21 - 32 mmol/L 31 31 31 30 26      Anion Gap 10 - 20 mmol/L 12 11 12 12 15      Urea Nitrogen 6 - 23 mg/dL 13 18 11 13 13      Creatinine 0.50 - 1.05 mg/dL 1.00 1.03 0.92 0.86 0.86  0.93    GFR Female >90 mL/min/1.73m2 64 62 CM 71 CM 77 CM       Comment:  CALCULATIONS OF ESTIMATED GFR ARE PERFORMED   USING THE 2021 CKD-EPI STUDY REFIT EQUATION   WITHOUT THE RACE VARIABLE FOR THE  IDMS-TRACEABLE   CREATININE METHODS.    https://jasn.asnjournals.org/content/early/2021/09/22/ASN.2465524516   Calcium 8.6 - 10.3 mg/dL 9.7 9.5 9.3 9.3 9.5      Albumin 3.4 - 5.0 g/dL 4.3  4.1 3.5       Alkaline Phosphatase 33 - 136 U/L 66  103 73       Total Protein 6.4 - 8.2 g/dL 7.9  7.4 7.6       AST 9 - 39 U/L 30  41 High  21    31   Total Bilirubin 0.0 - 1.2 mg/dL 1.1  0.6 0.7       ALT (SGPT) 7 - 45 U/L 20  37 CM 11 CM  20 CM     Comment:  Patients treate                  Physical Exam  Signs reviewed and normal  General-inspection reveals a well-developed well-nourished individual in no acute distress  Peripheral vascular left cock-up splint is noted but otherwise no striking of vibratory sensation deficits vascular tone is normal  Neck neck is all without masses adenopathy bruits or rigidity thyroid is normal  Respiratory-chest clear anteriorly and posteriorly  Cardiovascular RSR without murmurs gallop or ectopy  Mood-mood is stable anxiety oppressive or cognitive issues  Sugar 180 reviewed but otherwise thankfully normal kidney functions with a little active lupus chronic meds reviewed home sugars reviewed which still shows a 1 25-1 80 in the morning      Assessment/Plan   Problem List Items Addressed This Visit          Circulatory    Benign hypertension   Continue follow-up with a new rheumatologist and rheumatologic medicines.  Continue low-salt low-fat low carbohydrate diet  We will come in in 3 months we will get previsit A1c CMP  Continue increasing liquids and follow-up with and  Annually and also every 6 months no retinopathy at this time continue nightly foot examination will increase to 50 units of 75/25 before supper.  Continue above medicines diet and healthy routines  @discharge  The above diagnosis and treatment plan was discussed with the patient patient will continue appropriate diet and exercise as reviewed  Patient will recheck earlier if any interval problems of significance or  clinical worsening of the above problems.  Agrees above surveillance.  All question were addressed regarding above meds

## 2023-05-14 PROBLEM — H69.91 DYSFUNCTION OF RIGHT EUSTACHIAN TUBE: Status: RESOLVED | Noted: 2023-01-26 | Resolved: 2023-05-14

## 2023-05-14 ASSESSMENT — ENCOUNTER SYMPTOMS
CHEST TIGHTNESS: 0
ABDOMINAL PAIN: 0
DYSPHORIC MOOD: 0
SLEEP DISTURBANCE: 0
HEADACHES: 0
MYALGIAS: 1
ARTHRALGIAS: 1
PALPITATIONS: 0
BLOOD IN STOOL: 0
FEVER: 0
HEMATURIA: 0
FREQUENCY: 0
WEAKNESS: 0
SHORTNESS OF BREATH: 0
BACK PAIN: 1
FATIGUE: 0
DYSURIA: 0
COUGH: 0

## 2023-05-15 ENCOUNTER — APPOINTMENT (OUTPATIENT)
Dept: PRIMARY CARE | Facility: CLINIC | Age: 63
End: 2023-05-15
Payer: MEDICARE

## 2023-07-03 ENCOUNTER — LAB (OUTPATIENT)
Dept: LAB | Facility: LAB | Age: 63
End: 2023-07-03
Payer: MEDICARE

## 2023-07-03 DIAGNOSIS — D72.819 SYSTEMIC LUPUS ERYTHEMATOSUS (SLE) WITH LEUKOPENIA (MULTI): ICD-10-CM

## 2023-07-03 DIAGNOSIS — M32.19 SYSTEMIC LUPUS ERYTHEMATOSUS (SLE) WITH LEUKOPENIA (MULTI): ICD-10-CM

## 2023-07-03 DIAGNOSIS — E11.42 DM TYPE 2 WITH DIABETIC PERIPHERAL NEUROPATHY (MULTI): ICD-10-CM

## 2023-07-03 LAB
ALANINE AMINOTRANSFERASE (SGPT) (U/L) IN SER/PLAS: 18 U/L (ref 7–45)
ALBUMIN (G/DL) IN SER/PLAS: 4.4 G/DL (ref 3.4–5)
ALKALINE PHOSPHATASE (U/L) IN SER/PLAS: 83 U/L (ref 33–136)
ANION GAP IN SER/PLAS: 15 MMOL/L (ref 10–20)
ASPARTATE AMINOTRANSFERASE (SGOT) (U/L) IN SER/PLAS: 30 U/L (ref 9–39)
BILIRUBIN TOTAL (MG/DL) IN SER/PLAS: 0.9 MG/DL (ref 0–1.2)
CALCIUM (MG/DL) IN SER/PLAS: 9.5 MG/DL (ref 8.6–10.3)
CARBON DIOXIDE, TOTAL (MMOL/L) IN SER/PLAS: 26 MMOL/L (ref 21–32)
CHLORIDE (MMOL/L) IN SER/PLAS: 101 MMOL/L (ref 98–107)
CREATININE (MG/DL) IN SER/PLAS: 1.01 MG/DL (ref 0.5–1.05)
ESTIMATED AVERAGE GLUCOSE FOR HBA1C: 120 MG/DL
GFR FEMALE: 63 ML/MIN/1.73M2
GLUCOSE (MG/DL) IN SER/PLAS: 158 MG/DL (ref 74–99)
HEMOGLOBIN A1C/HEMOGLOBIN TOTAL IN BLOOD: 5.8 %
POTASSIUM (MMOL/L) IN SER/PLAS: 3.9 MMOL/L (ref 3.5–5.3)
PROTEIN TOTAL: 7.6 G/DL (ref 6.4–8.2)
SODIUM (MMOL/L) IN SER/PLAS: 138 MMOL/L (ref 136–145)
UREA NITROGEN (MG/DL) IN SER/PLAS: 15 MG/DL (ref 6–23)

## 2023-07-03 PROCEDURE — 80053 COMPREHEN METABOLIC PANEL: CPT

## 2023-07-03 PROCEDURE — 83036 HEMOGLOBIN GLYCOSYLATED A1C: CPT

## 2023-07-03 PROCEDURE — 36415 COLL VENOUS BLD VENIPUNCTURE: CPT

## 2023-07-11 ENCOUNTER — OFFICE VISIT (OUTPATIENT)
Dept: PRIMARY CARE | Facility: CLINIC | Age: 63
End: 2023-07-11
Payer: MEDICARE

## 2023-07-11 VITALS
BODY MASS INDEX: 38.04 KG/M2 | HEIGHT: 68 IN | RESPIRATION RATE: 16 BRPM | HEART RATE: 65 BPM | TEMPERATURE: 96.9 F | WEIGHT: 251 LBS | OXYGEN SATURATION: 97 % | SYSTOLIC BLOOD PRESSURE: 124 MMHG | DIASTOLIC BLOOD PRESSURE: 76 MMHG

## 2023-07-11 DIAGNOSIS — E11.42 DM TYPE 2 WITH DIABETIC PERIPHERAL NEUROPATHY (MULTI): Primary | ICD-10-CM

## 2023-07-11 DIAGNOSIS — I10 BENIGN HYPERTENSION: ICD-10-CM

## 2023-07-11 DIAGNOSIS — D72.819 SYSTEMIC LUPUS ERYTHEMATOSUS (SLE) WITH LEUKOPENIA (MULTI): ICD-10-CM

## 2023-07-11 DIAGNOSIS — M32.19 SYSTEMIC LUPUS ERYTHEMATOSUS (SLE) WITH LEUKOPENIA (MULTI): ICD-10-CM

## 2023-07-11 PROCEDURE — 3044F HG A1C LEVEL LT 7.0%: CPT | Performed by: FAMILY MEDICINE

## 2023-07-11 PROCEDURE — 99213 OFFICE O/P EST LOW 20 MIN: CPT | Performed by: FAMILY MEDICINE

## 2023-07-11 PROCEDURE — 3074F SYST BP LT 130 MM HG: CPT | Performed by: FAMILY MEDICINE

## 2023-07-11 PROCEDURE — 1036F TOBACCO NON-USER: CPT | Performed by: FAMILY MEDICINE

## 2023-07-11 PROCEDURE — 3078F DIAST BP <80 MM HG: CPT | Performed by: FAMILY MEDICINE

## 2023-07-11 NOTE — PROGRESS NOTES
"Subjective   Patient ID: Debra Marroquin is a 63 y.o. female who presents for Diabetes (2 month follow up ).  HPI  63-year-old female here to recheck her diabetes past medical history followed for hypertension and Charcot deformity the left foot as well as stomach lupus she remains on her Plaquenil twice a day and is followed by rheumatologist regularly [place] CBC liver function kidney function been normal recently to takes her 30 units in the morning and 40 units in the afternoon of the 75/25 insulin as well as 1000 metformin twice a day.  Follow diet fairly well and her sugars range from 130 in 120s up to 155.  Recent sugars 158 and earlier A1c is 5.8  Lasix 20 mg daily for peripheral edema as well as hypertension  Patient remains as active as she can with her Charcot brace to the left foot without any chest pain shortness palpitations or new GI- issues.  Follows a low-salt low carbohydrate diet fairly well  Current Meds reviewed no reported side effects.  Reviewed earlier lab with stable A1c liver and kidney functions  By ophthalmology every 6 months  Review of Systems   Constitutional:  Negative for fatigue and fever.   Eyes:  Negative for visual disturbance.   Respiratory:  Negative for cough, chest tightness and shortness of breath.    Cardiovascular:  Positive for leg swelling. Negative for chest pain and palpitations.   Gastrointestinal:  Negative for abdominal pain and blood in stool.   Genitourinary:  Negative for dysuria, frequency and hematuria.   Musculoskeletal:  Positive for arthralgias and gait problem. Negative for myalgias.   Skin:  Negative for rash.   Neurological:  Negative for weakness and headaches.   Psychiatric/Behavioral:  Negative for behavioral problems and sleep disturbance.        Objective   /76 (BP Location: Right arm, Patient Position: Sitting, BP Cuff Size: Large adult)   Pulse 65   Temp 36.1 °C (96.9 °F) (Temporal)   Resp 16   Ht 1.727 m (5' 8\")   Wt 114 kg (251 lb)   " SpO2 97%   BMI 38.16 kg/m²     Updated   Procedure   07/03/23 2105  Hemoglobin A1C   Collected: 07/03/23 0835  Final result  Specimen: Blood, Venous    Hemoglobin A1C 5.8 Abnormal  %  Estimated Average Glucose 120 MG/DL          07/03/23 1357  Comprehensive metabolic panel   Collected: 07/03/23 0835  Final result  Specimen: Blood, Venous    Glucose 158 High  mg/dL GFR Female 63 mL/min/1.73m2    Sodium 138 mmol/L Calcium 9.5 mg/dL   Potassium 3.9 mmol/L Albumin 4.4 g/dL   Chloride 101 mmol/L Alkaline Phosphatase 83 U/L   Bicarbonate 26 mmol/L Total Protein 7.6 g/dL   Anion Gap 15 mmol/L AST 30 U/L   Urea Nitrogen 15 mg/dL Total Bilirubin 0.9 mg/dL   Creatinine 1.01 mg/dL ALT (SGPT) 18 U/L                 Physical Exam  Reviewed and normal pulse ox normal 3 pound weight loss noted  General-inspection of the well-developed well-nourished large from individual in no acute distress  Neck neck is without mass adenopathy bruits rigidity thyroid is normal  Respiratory-chest clear anteriorly and posteriorly  Cardiovascular RSR without murmurs gallop or ectopy  Peripheral vascular slightly reduced sensation to vibration in the feet and left cock-up splint is on her left foot.  No pretibial edema noted today.  Skin-no new rash petechiae or jaundice  Mood mood is stable out anxiety depressive or anxiety or cognitive issues      Assessment/Plan   Problem List Items Addressed This Visit    None  Due to recent sugar 158 normal A1c of 5.8 we will continue her current split dose of 75/25 insulin as well as her metformin continue low-salt low-fat low carbohydrate diet.  We will come in about 4 months we will get previsit A1c BMP continue follow-up with her rheumatologist as well as her Plaquenil which she is tolerated well continue annual eye exam every 6 months  May continue Lasix and take Tylenol if needed for pain.  Otherwise frequent small meals advised otherwise observing good routines follow-up early if interval  problems  @discharge  The above diagnosis and treatment plan was discussed with the patient patient will continue appropriate diet and exercise as reviewed  Patient will recheck earlier if any interval problems of significance or clinical worsening of the above problems.  Agrees above surveillance.  All question were addressed regarding above meds

## 2023-07-16 ASSESSMENT — ENCOUNTER SYMPTOMS
FATIGUE: 0
WEAKNESS: 0
ARTHRALGIAS: 1
COUGH: 0
CHEST TIGHTNESS: 0
SLEEP DISTURBANCE: 0
DYSURIA: 0
BLOOD IN STOOL: 0
PALPITATIONS: 0
SHORTNESS OF BREATH: 0
MYALGIAS: 0
FREQUENCY: 0
HEMATURIA: 0
ABDOMINAL PAIN: 0
HEADACHES: 0
FEVER: 0

## 2023-08-16 DIAGNOSIS — E11.42 DM TYPE 2 WITH DIABETIC PERIPHERAL NEUROPATHY (MULTI): ICD-10-CM

## 2023-08-16 RX ORDER — INSULIN LISPRO 100 [IU]/ML
INJECTION, SUSPENSION SUBCUTANEOUS
Qty: 15 ML | Refills: 0 | Status: SHIPPED | OUTPATIENT
Start: 2023-08-16 | End: 2023-09-05

## 2023-09-02 DIAGNOSIS — E11.42 DM TYPE 2 WITH DIABETIC PERIPHERAL NEUROPATHY (MULTI): ICD-10-CM

## 2023-09-05 RX ORDER — INSULIN LISPRO 100 [IU]/ML
INJECTION, SUSPENSION SUBCUTANEOUS
Qty: 15 ML | Refills: 3 | Status: SHIPPED | OUTPATIENT
Start: 2023-09-05 | End: 2024-01-15 | Stop reason: SDUPTHER

## 2023-10-16 ENCOUNTER — OFFICE VISIT (OUTPATIENT)
Dept: PRIMARY CARE | Facility: CLINIC | Age: 63
End: 2023-10-16
Payer: MEDICARE

## 2023-10-16 VITALS
HEIGHT: 68 IN | SYSTOLIC BLOOD PRESSURE: 126 MMHG | TEMPERATURE: 96.9 F | DIASTOLIC BLOOD PRESSURE: 82 MMHG | RESPIRATION RATE: 16 BRPM | HEART RATE: 56 BPM | OXYGEN SATURATION: 97 % | BODY MASS INDEX: 38.95 KG/M2 | WEIGHT: 257 LBS

## 2023-10-16 DIAGNOSIS — H73.22 MYRINGITIS OF LEFT EAR: ICD-10-CM

## 2023-10-16 DIAGNOSIS — R09.81 SINUS CONGESTION: Primary | ICD-10-CM

## 2023-10-16 DIAGNOSIS — E11.42 DM TYPE 2 WITH DIABETIC PERIPHERAL NEUROPATHY (MULTI): ICD-10-CM

## 2023-10-16 PROCEDURE — 3074F SYST BP LT 130 MM HG: CPT | Performed by: FAMILY MEDICINE

## 2023-10-16 PROCEDURE — 99213 OFFICE O/P EST LOW 20 MIN: CPT | Performed by: FAMILY MEDICINE

## 2023-10-16 PROCEDURE — 3044F HG A1C LEVEL LT 7.0%: CPT | Performed by: FAMILY MEDICINE

## 2023-10-16 PROCEDURE — 1036F TOBACCO NON-USER: CPT | Performed by: FAMILY MEDICINE

## 2023-10-16 PROCEDURE — 3079F DIAST BP 80-89 MM HG: CPT | Performed by: FAMILY MEDICINE

## 2023-10-16 RX ORDER — DOXYCYCLINE 100 MG/1
100 CAPSULE ORAL
Qty: 20 CAPSULE | Refills: 0 | Status: SHIPPED | OUTPATIENT
Start: 2023-10-16 | End: 2023-10-26

## 2023-10-16 NOTE — PROGRESS NOTES
Subjective   Patient ID: Debra Marroquin is a 63 y.o. female who presents for Earache (Left ear).  HPI  63-year-old female history of lupus syndrome type 2 diabetes on insulin is here because of 3 to 5 days of postnasal drainage sinus pressure and deep left ear pressure some occasional popping and diminished hearing in the left ear.  Otherwise no otorrhea high fever shaking chills taste or smell disturbance Home COVID test was negative no increasing shortness of breath chest pain palpitations or cough.  Is still on her Plaquenil through rheumatology and takes her diabetic medicines as above  Gratefully no striking wheezing shortness of breath or cough at this time nasal steroids and antihistamines continued sinus pressure and deep left ear pressure  Chronic meds reviewed no reported side effects.  Is also followed by podiatry for her Charcot process of her left foot  Review of Systems   Constitutional:  Positive for fatigue. Negative for fever and unexpected weight change.   HENT:  Positive for ear pain, rhinorrhea, sinus pressure, sinus pain and sore throat. Negative for ear discharge, trouble swallowing and voice change.    Eyes:  Negative for discharge and visual disturbance.   Respiratory:  Negative for cough, chest tightness, shortness of breath and wheezing.    Cardiovascular:  Negative for chest pain, palpitations and leg swelling.   Gastrointestinal:  Negative for abdominal pain and blood in stool.   Genitourinary:  Negative for dysuria, frequency and hematuria.   Musculoskeletal:  Positive for arthralgias and gait problem. Negative for myalgias.   Skin:  Negative for rash.   Neurological:  Positive for headaches. Negative for weakness and light-headedness.   Psychiatric/Behavioral:  Negative for behavioral problems, sleep disturbance and suicidal ideas.        Objective   /82 (BP Location: Right arm, Patient Position: Sitting, BP Cuff Size: Large adult)   Pulse 56   Temp 36.1 °C (96.9 °F) (Temporal)   " Resp 16   Ht 1.727 m (5' 8\")   Wt 117 kg (257 lb)   SpO2 97%   BMI 39.08 kg/m²   ntains abnormal data Hemoglobin A1C  Order: [id number]  Status: Final result       Visible to patient: Yes (seen)       Dx: DM type 2 with diabetic peripheral ne...    2 Result Notes       1 HM Topic            Component  Ref Range & Units 3 mo ago  (7/3/23) 7 mo ago  (3/14/23) 1 yr ago  (8/16/22) 1 yr ago  (5/3/22) 1 yr ago  (4/1/22) 1 yr ago  (3/30/22) 1 yr ago  (11/18/21)   Hemoglobin A1C  % 5.8 Abnormal  5.9 Abnormal  CM 5.8 Abnormal  CM 6.4 High  R, CM 6.2 Abnormal  CM 6.5 High  R, CM 5.4 CM   Comment:      Diagnosis of Diabetes-Adults   Non-Diabetic: < or = 5.6%   Increased risk for developing diabetes: 5.7-6.4%   Diagnostic of diabetes: > or = 6.5%  .       Monitoring of Diabetes                Age (y)     Therapeutic Goal (%)   Adults:        ensive metabolic panel  Order: [id number]  Status: Final result       Visible to patient: Yes (seen)       Dx: DM type 2 with diabetic peripheral ne...    2 Result Notes              Component  Ref Range & Units 3 mo ago  (7/3/23) 7 mo ago  (3/14/23) 11 mo ago  (11/21/22) 1 yr ago  (8/16/22) 1 yr ago  (4/1/22) 1 yr ago  (11/18/21) 1 yr ago  (11/12/21) 1 yr ago  (11/12/21) 1 yr ago  (11/12/21)   Glucose  74 - 99 mg/dL 158 High  181 High  112 High  151 High  141 High  130 High       Sodium  136 - 145 mmol/L 138 138 138 140 140 139      Potassium  3.5 - 5.3 mmol/L 3.9 4.0 4.2 4.0 4.1 3.9      Chloride  98 - 107 mmol/L 101 99 100 101 102 102      Bicarbonate  21 - 32 mmol/L 26 31 31 31 30 26      Anion Gap  10 - 20 mmol/L 15 12 11 12 12 15      Urea Nitrogen  6 - 23 mg/dL 15 13 18 11 13 13      Creatinine  0.50 - 1.05 mg/dL 1.01 1.00 1.03 0.92 0.86 0.86  0.93    GFR Female  >90 mL/min/1.73m2 63 64 CM 62 CM 71 CM 77 CM       Comment:  CALCULATIONS OF ESTIMATED GFR ARE PERFORMED   USING THE 2021 CKD-EPI STUDY REFIT EQUATION   WITHOUT THE RACE VARIABLE FOR THE IDMS-TRACEABLE   CREATININE " METHODS.    https://jasn.asnjournals.org/content/early/2021/09/22/ASN.7277636283   Calcium  8.6 - 10.3 mg/dL 9.5 9.7 9.5 9.3 9.3 9.5      Albumin  3.4 - 5.0 g/dL 4.4 4.3  4.1 3.5       Alkaline Phosphatase  33 - 136 U/L 83 66  103 73       Total Protein  6.4 - 8.2 g/dL 7.6 7.9  7.4 7.6       AST  9 - 39 U/L 30 30  41 High  21    31   Total Bilirubin  0.0 - 1.2 mg/dL 0.9 1.1  0.6 0.7       ALT (SGPT)  7 - 45 U/L 18 20 CM  37 CM 11 CM  20 CM     Comment:  Patients treated with Sulfasalazine may generate   falsely decreased results for ALT.   Resulting                 Order: [id number]  Status: Final result       Visible to patient: Yes (seen)    0 Result Notes         Component  Ref Range & Units 7 mo ago  (3/14/23) 7 mo ago  (3/14/23) 4 yr ago  (10/8/19) 4 yr ago  (6/4/19)   Anti-SM  AI <0.2      Comment: REF VALUES  < 1.0 = NEGATIVE  >=1.0 = POSITIVE   Anti-RNP  AI 0.2      Comment: REF VALUES  < 1.0 = NEGATIVE  >=1.0 = POSITIVE   Anti-SM/RNP  AI <0.2      Comment: REF VALUES  < 1.0 = NEGATIVE  >=1.0 = POSITIVE   Anti-SSA  AI <0.2      Comment: REF VALUES  < 1.0 = NEGATIVE  >=1.0 = POSITIVE   Anti-SSB  AI <0.2      Comment: REF VALUES  < 1.0 = NEGATIVE  >=1.0 = POSITIVE   Anti-SCL-70  AI <0.2      Comment: REF VALUES  < 1.0 = NEGATIVE  >=1.0 = POSITIVE   Anti-ARIANA-1 IgG  AI <0.2      Comment: REF VALUES  < 1.0 = NEGATIVE  >=1.0 = POSITIVE   Anti-Chromatin  AI 0.3      Comment: REF VALUES  < 1.0 = NEGATIVE  >=1.0 = POSITIVE   Anti-Centromere  AI <0.2      Comment: REF VALUES  < 1.0 = NEGATIVE  >=1.0 = POSITIVE   Anti-Ribosomal P  AI <0.2      Comment: REF VALUES  < 1.0 = NEGATIVE  >=1.0 = POSITIVE   Anti-DNA (DS)  IU/mL 37.0 Abnormal  37.0 Abnormal  CM 15.0 Abnormal  CM 18.0 Abnormal  CM   Comment: REF VALUES  NEGATIVE:    <= 4 IU/ML  EQUIVOCAL:   5- 9 IU/ML  POSITIVE:    >=10 IU/ML   Resulting Agency [facility]              Specimen Collected: 03/14/23 11:26 Last Resulted: 03/15/23 16:34        Lab  Flowsheet        Order Details        View Encounter        Lab and Collection Details        Routing        Result History     View All Conversations on this Encounter        CM=Additional comments        Result Care Coordination                Physical Exam  Vital signs reviewed and normal  General inspection feels a pelvic otherwise nontender nontoxic individual in no acute distress  ENT the left TM shows a bleb at the posterior aspect with air-fluid levels appear hyperemia right TM shows scarring and retraction only no shows a cell deviation with marked edema with yellow rhinorrhea in the left more than right very minimal tenderness to left maxillary sinus oral exam shows posterior injection only no ulcerations or exudate  Neck is supple masses adenopathy bruits or rigidity thyroid is normal no JVD  Pulmonary-clear without wheezing rales or rhonchi  Cardiovascular regular sinus rhythm without significant murmurs gallop or ectopy  Neurological no cranial nerve deficits speech is clear and gait is usual for her with her left cock-up splint      Assessment/Plan   Problem List Items Addressed This Visit    None  Potential atypical infection with bleb of the left ear we use nasal Afrin for the next 5 days can use low-dose Sudafed as well as her antihistamine and nasal saline we will add doxycycline for possible atypical infection and 100 mg twice a day use and side effects reviewed  May continue her chronic medicines outlined above as well as low-salt low carbohydrate low-fat diet.  Follow-up before the holidays to recheck progress in regards to her diabetes is followed by new rheumatologist.  Change in visual acuity through ophthalmology since she is on Plaquenil and 400 mg daily.  I can send it in days if any interval worsening of the left ear pain  @discharge  The above diagnosis and treatment plan was discussed with the patient patient will continue appropriate diet and exercise as reviewed  Patient will recheck  earlier if any interval problems of significance or clinical worsening of the above problems.  Agrees above surveillance.  All question were addressed regarding above meds

## 2023-10-19 PROBLEM — R09.81 SINUS CONGESTION: Status: ACTIVE | Noted: 2023-10-19

## 2023-10-19 PROBLEM — H73.22 MYRINGITIS OF LEFT EAR: Status: ACTIVE | Noted: 2023-10-19

## 2023-10-19 ASSESSMENT — ENCOUNTER SYMPTOMS
WEAKNESS: 0
CHEST TIGHTNESS: 0
EYE DISCHARGE: 0
FEVER: 0
HEADACHES: 1
MYALGIAS: 0
UNEXPECTED WEIGHT CHANGE: 0
RHINORRHEA: 1
PALPITATIONS: 0
HEMATURIA: 0
SINUS PAIN: 1
LIGHT-HEADEDNESS: 0
SHORTNESS OF BREATH: 0
FATIGUE: 1
COUGH: 0
SLEEP DISTURBANCE: 0
BLOOD IN STOOL: 0
ABDOMINAL PAIN: 0
FREQUENCY: 0
VOICE CHANGE: 0
DYSURIA: 0
SINUS PRESSURE: 1
SORE THROAT: 1
TROUBLE SWALLOWING: 0
WHEEZING: 0
ARTHRALGIAS: 1

## 2023-10-23 ENCOUNTER — CLINICAL SUPPORT (OUTPATIENT)
Dept: PRIMARY CARE | Facility: CLINIC | Age: 63
End: 2023-10-23
Payer: MEDICARE

## 2023-10-23 DIAGNOSIS — Z23 NEED FOR INFLUENZA VACCINATION: ICD-10-CM

## 2023-10-23 PROCEDURE — 90682 RIV4 VACC RECOMBINANT DNA IM: CPT | Performed by: FAMILY MEDICINE

## 2023-10-23 PROCEDURE — G0008 ADMIN INFLUENZA VIRUS VAC: HCPCS | Performed by: FAMILY MEDICINE

## 2023-10-23 NOTE — PROGRESS NOTES
Patient presents today for influenza vaccination. Patient answered all screening questions for influenza vaccination. VIS given to patient. Patient received Flublok via IM injection of left deltoid. Patient tolerated vaccination well.

## 2023-10-24 ENCOUNTER — OFFICE VISIT (OUTPATIENT)
Dept: RHEUMATOLOGY | Facility: CLINIC | Age: 63
End: 2023-10-24
Payer: MEDICARE

## 2023-10-24 VITALS
BODY MASS INDEX: 38.8 KG/M2 | DIASTOLIC BLOOD PRESSURE: 74 MMHG | HEIGHT: 68 IN | WEIGHT: 256 LBS | SYSTOLIC BLOOD PRESSURE: 125 MMHG | HEART RATE: 71 BPM

## 2023-10-24 DIAGNOSIS — M32.9 SYSTEMIC LUPUS ERYTHEMATOSUS, UNSPECIFIED SLE TYPE, UNSPECIFIED ORGAN INVOLVEMENT STATUS (MULTI): Primary | ICD-10-CM

## 2023-10-24 PROCEDURE — 3044F HG A1C LEVEL LT 7.0%: CPT | Performed by: INTERNAL MEDICINE

## 2023-10-24 PROCEDURE — 3078F DIAST BP <80 MM HG: CPT | Performed by: INTERNAL MEDICINE

## 2023-10-24 PROCEDURE — 1036F TOBACCO NON-USER: CPT | Performed by: INTERNAL MEDICINE

## 2023-10-24 PROCEDURE — 99214 OFFICE O/P EST MOD 30 MIN: CPT | Performed by: INTERNAL MEDICINE

## 2023-10-24 PROCEDURE — 3074F SYST BP LT 130 MM HG: CPT | Performed by: INTERNAL MEDICINE

## 2023-10-24 RX ORDER — LANOLIN ALCOHOL/MO/W.PET/CERES
400 CREAM (GRAM) TOPICAL DAILY
COMMUNITY
End: 2023-11-14 | Stop reason: ENTERED-IN-ERROR

## 2023-10-24 RX ORDER — MULTIVIT-MIN/FERROUS FUMARATE 15 MG
1 TABLET ORAL DAILY
COMMUNITY
Start: 2020-06-30 | End: 2023-11-14 | Stop reason: ENTERED-IN-ERROR

## 2023-10-24 ASSESSMENT — PAIN SCALES - GENERAL: PAINLEVEL: 0-NO PAIN

## 2023-10-24 NOTE — PROGRESS NOTES
Subjective   Patient ID: Debra Marroquin is a 63 y.o. female who presents for Lupus (Pain in both hands. Dr Becerra patient.).    HPI  63 yr old WF with H/O SLE. She has leukopenia/lymphopenia, anti-DNA antibody positive and low C4. She has been on Plaquenil for several years.  She reports bilateral shoulder pain with L> R. Hurts to lay on them at night. No morning stiffness. Knees get stiff with prolonged sitting. She has OA in the knees.   She is able to make a fist.   No mouth sores, rash or photosensitivity, chest pain, Raynaud's or dry mouth.   Has dry eyes.      DEXA: 05/23:  Total femur T score: 1.8  Femur neck T score: 0.6  Spine L1-4 T score: 2.3  AN 1/160  dsDNA 37, C3, C4 NL, TRESA neg  Interval history:  She reports hand joint pain, stiffness, but denies joint swelling.  She has neck and shoulder pain at right site  She reports dry eyes and dry skin, dry mouth  Her recent eye exam for HCQ was in 09/23    ROS  Joint pain in hands: negative  Joint swelling: negative  Morning stiffness and duration: positive around 30 min   strength: normal  Oral ulcer: negative  Genital ulcer: negative  Raynaud phenomenon: negative  Chest pain/dyspnea: negative  Low back pain: negative  Visual problem: negative  Dry eyes/dry mouth: negative  Skin rash/scaling/psoriasis: negative       Objective     PEXAM  VS reviewed, WNL  General: Alert, no distress   HEENT: Normocephalic/atraumatic, No alopecia. PERRLA. Sclera white, conjunctiva pink, no malar rash. no oral or nasal ulcer. Oral cavity pink and moist, no erythema or exudate, dentition good.   Neck: supple  Respiratory: CTA B, no adventitious breath sounds  Cardiac: RRR, no murmurs, carotid, or bruits  Abdominal: symmetrical, soft, non-tender, non-distended, normoactive BSx4 quadrants, no CVA tenderness or suprapubic tenderness  MSK: Joints of upper and lower extremities were assessed for synovitis and ROM.    Today she has no evidence of synovitis in the joints of her  hands or wrists, tender joint count 0, swollen joint count 0   Extremities: no clubbing, no cyanosis, no edema  Skin: Skin warm and moist.   Neuro: non-focal, Strength 5/5 throughout. Normal gait. No cerebellar pathologic exam     Assessment/Plan   63 yr old with SLE which is in remission. Continue Plaquenil. Z  Recent eyexam in 09/23, NL     Right shoulder subacromial bursitis: Offered cortisone shot but declined.      Knee OA: Continue Tylenol arthritis.      DXA came NL    -will check her lupus markers  -will see her in 8-9 months

## 2023-10-25 ENCOUNTER — LAB (OUTPATIENT)
Dept: LAB | Facility: LAB | Age: 63
End: 2023-10-25
Payer: MEDICARE

## 2023-10-25 DIAGNOSIS — E11.42 DM TYPE 2 WITH DIABETIC PERIPHERAL NEUROPATHY (MULTI): ICD-10-CM

## 2023-10-25 DIAGNOSIS — M32.9 SYSTEMIC LUPUS ERYTHEMATOSUS, UNSPECIFIED SLE TYPE, UNSPECIFIED ORGAN INVOLVEMENT STATUS (MULTI): ICD-10-CM

## 2023-10-25 LAB
ANION GAP SERPL CALC-SCNC: 11 MMOL/L (ref 10–20)
BUN SERPL-MCNC: 11 MG/DL (ref 6–23)
C3 SERPL-MCNC: 138 MG/DL (ref 87–200)
C4 SERPL-MCNC: 11 MG/DL (ref 10–50)
CALCIUM SERPL-MCNC: 9.2 MG/DL (ref 8.6–10.3)
CHLORIDE SERPL-SCNC: 103 MMOL/L (ref 98–107)
CO2 SERPL-SCNC: 30 MMOL/L (ref 21–32)
CREAT SERPL-MCNC: 0.94 MG/DL (ref 0.5–1.05)
CRP SERPL-MCNC: 0.54 MG/DL
DSDNA AB SER-ACNC: 17 IU/ML
ERYTHROCYTE [DISTWIDTH] IN BLOOD BY AUTOMATED COUNT: 15.1 % (ref 11.5–14.5)
EST. AVERAGE GLUCOSE BLD GHB EST-MCNC: 128 MG/DL
GFR SERPL CREATININE-BSD FRML MDRD: 69 ML/MIN/1.73M*2
GLUCOSE SERPL-MCNC: 131 MG/DL (ref 74–99)
HBA1C MFR BLD: 6.1 %
HCT VFR BLD AUTO: 36.9 % (ref 36–46)
HGB BLD-MCNC: 12.2 G/DL (ref 12–16)
MCH RBC QN AUTO: 31.4 PG (ref 26–34)
MCHC RBC AUTO-ENTMCNC: 33.1 G/DL (ref 32–36)
MCV RBC AUTO: 95 FL (ref 80–100)
NRBC BLD-RTO: 0 /100 WBCS (ref 0–0)
PLATELET # BLD AUTO: 91 X10*3/UL (ref 150–450)
PMV BLD AUTO: 10.1 FL (ref 7.5–11.5)
POTASSIUM SERPL-SCNC: 4.2 MMOL/L (ref 3.5–5.3)
RBC # BLD AUTO: 3.88 X10*6/UL (ref 4–5.2)
SODIUM SERPL-SCNC: 140 MMOL/L (ref 136–145)
WBC # BLD AUTO: 2.8 X10*3/UL (ref 4.4–11.3)

## 2023-10-25 PROCEDURE — 86160 COMPLEMENT ANTIGEN: CPT

## 2023-10-25 PROCEDURE — 36415 COLL VENOUS BLD VENIPUNCTURE: CPT

## 2023-10-25 PROCEDURE — 86225 DNA ANTIBODY NATIVE: CPT

## 2023-10-25 PROCEDURE — 83036 HEMOGLOBIN GLYCOSYLATED A1C: CPT

## 2023-10-25 PROCEDURE — 85027 COMPLETE CBC AUTOMATED: CPT

## 2023-10-25 PROCEDURE — 80048 BASIC METABOLIC PNL TOTAL CA: CPT

## 2023-10-25 PROCEDURE — 86140 C-REACTIVE PROTEIN: CPT

## 2023-10-26 DIAGNOSIS — M32.9 SYSTEMIC LUPUS ERYTHEMATOSUS, UNSPECIFIED SLE TYPE, UNSPECIFIED ORGAN INVOLVEMENT STATUS (MULTI): Primary | ICD-10-CM

## 2023-11-14 ENCOUNTER — OFFICE VISIT (OUTPATIENT)
Dept: PRIMARY CARE | Facility: CLINIC | Age: 63
End: 2023-11-14
Payer: MEDICARE

## 2023-11-14 VITALS
SYSTOLIC BLOOD PRESSURE: 126 MMHG | RESPIRATION RATE: 16 BRPM | DIASTOLIC BLOOD PRESSURE: 68 MMHG | OXYGEN SATURATION: 98 % | HEART RATE: 63 BPM | HEIGHT: 68 IN | WEIGHT: 258 LBS | TEMPERATURE: 96.6 F | BODY MASS INDEX: 39.1 KG/M2

## 2023-11-14 DIAGNOSIS — D72.819 SYSTEMIC LUPUS ERYTHEMATOSUS (SLE) WITH LEUKOPENIA (MULTI): ICD-10-CM

## 2023-11-14 DIAGNOSIS — E11.42 DM TYPE 2 WITH DIABETIC PERIPHERAL NEUROPATHY (MULTI): Primary | ICD-10-CM

## 2023-11-14 DIAGNOSIS — M32.19 SYSTEMIC LUPUS ERYTHEMATOSUS (SLE) WITH LEUKOPENIA (MULTI): ICD-10-CM

## 2023-11-14 DIAGNOSIS — I10 BENIGN HYPERTENSION: ICD-10-CM

## 2023-11-14 PROCEDURE — 99213 OFFICE O/P EST LOW 20 MIN: CPT | Performed by: FAMILY MEDICINE

## 2023-11-14 PROCEDURE — 3078F DIAST BP <80 MM HG: CPT | Performed by: FAMILY MEDICINE

## 2023-11-14 PROCEDURE — 3044F HG A1C LEVEL LT 7.0%: CPT | Performed by: FAMILY MEDICINE

## 2023-11-14 PROCEDURE — 1036F TOBACCO NON-USER: CPT | Performed by: FAMILY MEDICINE

## 2023-11-14 PROCEDURE — 3074F SYST BP LT 130 MM HG: CPT | Performed by: FAMILY MEDICINE

## 2023-11-14 RX ORDER — MULTIVIT WITH MINERALS/HERBS
1 TABLET ORAL DAILY
COMMUNITY

## 2023-11-14 RX ORDER — METFORMIN HYDROCHLORIDE 1000 MG/1
1000 TABLET ORAL 2 TIMES DAILY
Qty: 180 TABLET | Refills: 3 | Status: SHIPPED | OUTPATIENT
Start: 2023-11-14 | End: 2024-11-08

## 2023-11-14 RX ORDER — ASCORBIC ACID 500 MG
500 TABLET ORAL DAILY
COMMUNITY

## 2023-11-14 NOTE — PROGRESS NOTES
Subjective   Patient ID: Debra Marroquin is a 63 y.o. female who presents for Diabetes (4 month follow up ).  HPI  34/36/ins  63-year-old female with history of lupus syndrome on Plaquenil twice a day.  Done fairly well and her white count 2800 stable platelet count up to 91,000 which is good for her but her any anti-DNA still remains positive gratefully her complements are normal as well as her C-reactive protein  Does take 34 units of 7525 homolog insulin in the morning and 30 6 in the afternoon sugars range from 110s to 150 depend on diet gratefully recent A1c is less than 7 and sugar is 1/31/1935.  Otherwise kidney function and liver function potassium are normal hemoglobin is now stable and she does take over-the-counter vitamin D and B12 and iron form is ferrous gluconate.  Denies any recent chest pain short of breath palpitations or new GI- issues.  Does have Charcot deformity of both feet and does wear cock-up splint at the ankle on the left.  Rheumatology.  Also no change in visual acuity with and gets every 6-month checkup.  No change otherwise in visual acuity or foot exam  Review of Systems   Constitutional:  Negative for fatigue and fever.   Eyes:  Negative for visual disturbance.   Respiratory:  Negative for cough, chest tightness and shortness of breath.    Cardiovascular:  Negative for chest pain, palpitations and leg swelling.   Gastrointestinal:  Negative for abdominal pain, blood in stool and vomiting.   Genitourinary:  Positive for frequency. Negative for dysuria and hematuria.   Musculoskeletal:  Positive for arthralgias, back pain and myalgias.   Skin:  Negative for rash.   Neurological:  Positive for numbness. Negative for weakness, light-headedness and headaches.   Psychiatric/Behavioral:  Negative for behavioral problems, confusion, sleep disturbance and suicidal ideas.        Objective   /68 (BP Location: Right arm, Patient Position: Sitting, BP Cuff Size: Large adult)   Pulse 63    "Temp 35.9 °C (96.6 °F) (Temporal)   Resp 16   Ht 1.727 m (5' 8\")   Wt 117 kg (258 lb)   SpO2 98%   BMI 39.23 kg/m²         Lab Results Since Your Last Visit  as of 11/14/2023, sorted by update time  Updated   Procedure   10/25/23 1931  Anti-DNA Antibody, Double-Stranded   Collected: 10/25/23 0752  Final result  Specimen: Blood, Venous    Anti-DNA (DS) 17.0 High  IU/mL             10/25/23 1817  Hemoglobin A1C   Collected: 10/25/23 0752  Final result  Specimen: Blood, Venous    Hemoglobin A1C 6.1 High  % Estimated Average Glucose 128 mg/dL          10/25/23 1811  C4 Complement   Collected: 10/25/23 0752  Final result  Specimen: Blood, Venous    C4 Complement 11 mg/dL            10/25/23 1811  C3 Complement   Collected: 10/25/23 0752  Final result  Specimen: Blood, Venous    C3 Complement 138 mg/dL            10/25/23 1342  CBC   Collected: 10/25/23 0752  Final result  Specimen: Blood, Venous    WBC 2.8 Low  x10*3/uL MCH 31.4 pg   nRBC 0.0 /100 WBCs MCHC 33.1 g/dL   RBC 3.88 Low  x10*6/uL RDW 15.1 High  %   Hemoglobin 12.2 g/dL Platelets 91 Low  x10*3/uL    Hematocrit 36.9 % MPV 10.1 fL   MCV 95 fL            10/25/23 1335  Basic metabolic panel   Collected: 10/25/23 0752  Final result  Specimen: Blood, Venous    Glucose 131 High  mg/dL Anion Gap 11 mmol/L   Sodium 140 mmol/L Urea Nitrogen 11 mg/dL   Potassium 4.2 mmol/L Creatinine 0.94 mg/dL   Chloride 103 mmol/L eGFR 69 mL/min/1.73m*2    Bicarbonate 30 mmol/L Calcium 9.2 mg/dL          10/25/23 1335  C-Reactive Protein   Collected: 10/25/23 0752  Final result  Specimen: Blood, Venous    C-Reactive Protein 0.54 mg/dL              Physical Exam  All signs reviewed and normal pulse ox good at 98  General inspection of the pleasant obese individual in no acute distress  Neck neck is all without mass adenopathy bruits rigidity thyroid is normal no JVD  Cardiovascular RSR without significant murmurs gallop or ectopy  Pulmonary-chest clear without " wheezing rales or rhonchi  Peripheral vascular no symmetric or asymmetric edema mildly reduced sensation to touch more so with vibration  Mood mood is stable anxiety depressive or cognitive issues  Skin no new rashes petechiae or jaundice  Above lab reviewed stable white count stable platelet count and normal C-reactive protein      Assessment/Plan   Problem List Items Addressed This Visit       DM type 2 with diabetic peripheral neuropathy (CMS/HCC)   May switch to take 36 units of insulin the morning of 30 4 in the evening with good morning sugars.  Continue her Lasix for blood pressure and edema and also her Plaquenil for arthritis.  Continue healthy routines good low-salt low-fat low carbohydrate diet.  Statins in the past otherwise follow-up with a rheumatologist follow-up with us in 3 to 4 months recheck A1c.  SBE as well as self foot examination.  Questions regarding above lab reviewed  continue current medications~  diet- decrease carbohydrates and sugar intake~  increase exercise as tolerated~  stay up to date with eye and foot exams~  call if any questions or concerns~follow up in 3-6 months~

## 2023-11-19 PROBLEM — H73.22 MYRINGITIS OF LEFT EAR: Status: RESOLVED | Noted: 2023-10-19 | Resolved: 2023-11-19

## 2023-11-19 ASSESSMENT — ENCOUNTER SYMPTOMS
LIGHT-HEADEDNESS: 0
HEADACHES: 0
WEAKNESS: 0
SHORTNESS OF BREATH: 0
CONFUSION: 0
DYSURIA: 0
PALPITATIONS: 0
BLOOD IN STOOL: 0
MYALGIAS: 1
HEMATURIA: 0
FEVER: 0
SLEEP DISTURBANCE: 0
COUGH: 0
BACK PAIN: 1
FREQUENCY: 1
FATIGUE: 0
NUMBNESS: 1
VOMITING: 0
ABDOMINAL PAIN: 0
CHEST TIGHTNESS: 0
ARTHRALGIAS: 1

## 2024-01-15 ENCOUNTER — OFFICE VISIT (OUTPATIENT)
Dept: PRIMARY CARE | Facility: CLINIC | Age: 64
End: 2024-01-15
Payer: MEDICARE

## 2024-01-15 VITALS
SYSTOLIC BLOOD PRESSURE: 118 MMHG | RESPIRATION RATE: 16 BRPM | WEIGHT: 252 LBS | HEIGHT: 68 IN | TEMPERATURE: 96 F | BODY MASS INDEX: 38.19 KG/M2 | HEART RATE: 96 BPM | DIASTOLIC BLOOD PRESSURE: 76 MMHG | OXYGEN SATURATION: 97 %

## 2024-01-15 DIAGNOSIS — E11.42 DM TYPE 2 WITH DIABETIC PERIPHERAL NEUROPATHY (MULTI): Primary | ICD-10-CM

## 2024-01-15 DIAGNOSIS — L97.524 NON-PRESSURE CHRONIC ULCER OF OTHER PART OF LEFT FOOT WITH NECROSIS OF BONE (MULTI): ICD-10-CM

## 2024-01-15 DIAGNOSIS — D72.819 SYSTEMIC LUPUS ERYTHEMATOSUS (SLE) WITH LEUKOPENIA (MULTI): ICD-10-CM

## 2024-01-15 DIAGNOSIS — M32.19 SYSTEMIC LUPUS ERYTHEMATOSUS (SLE) WITH LEUKOPENIA (MULTI): ICD-10-CM

## 2024-01-15 DIAGNOSIS — E66.01 OBESITY, MORBID (MULTI): ICD-10-CM

## 2024-01-15 DIAGNOSIS — D72.819 CHRONIC LEUKOPENIA: ICD-10-CM

## 2024-01-15 PROCEDURE — 3078F DIAST BP <80 MM HG: CPT | Performed by: FAMILY MEDICINE

## 2024-01-15 PROCEDURE — 3074F SYST BP LT 130 MM HG: CPT | Performed by: FAMILY MEDICINE

## 2024-01-15 PROCEDURE — 99213 OFFICE O/P EST LOW 20 MIN: CPT | Performed by: FAMILY MEDICINE

## 2024-01-15 PROCEDURE — 1036F TOBACCO NON-USER: CPT | Performed by: FAMILY MEDICINE

## 2024-01-15 RX ORDER — INSULIN LISPRO 100 [IU]/ML
INJECTION, SUSPENSION SUBCUTANEOUS
Qty: 15 ML | Refills: 3 | Status: SHIPPED | OUTPATIENT
Start: 2024-01-15 | End: 2024-04-17 | Stop reason: SDUPTHER

## 2024-01-15 NOTE — PROGRESS NOTES
"Subjective   Patient ID: Debra Marroquin is a 63 y.o. female who presents for Diabetes (2 month follow up ).  HPIpt w/ charcot foot/htn/iddm to recheck  Pt on 75/25 humalog bid at 30-40 units; am accuchecks at 120-155 in am/pm  No change in left foot brace; no new arthralgia--on plaquenil 200 bid/sle; followed by rheum;   Lasix for legs at 20mg  Oral iron  Otc/b12  Breathing -stable  Leukopenia--stable  Followed by Podiatry reg-no new ulcers  Review of Systems   Constitutional:  Negative for fatigue, fever and unexpected weight change.   Eyes:  Negative for visual disturbance.   Respiratory:  Negative for cough, chest tightness, shortness of breath and wheezing.    Cardiovascular:  Negative for chest pain, palpitations and leg swelling.   Gastrointestinal:  Positive for diarrhea. Negative for abdominal distention, abdominal pain and blood in stool.   Genitourinary:  Positive for frequency. Negative for dysuria and hematuria.   Musculoskeletal:  Positive for arthralgias, back pain and myalgias.   Skin:  Negative for rash.   Neurological:  Positive for numbness. Negative for weakness, light-headedness and headaches.   Hematological:  Negative for adenopathy.   Psychiatric/Behavioral:  Negative for behavioral problems, dysphoric mood, sleep disturbance and suicidal ideas.        Objective   /76 (BP Location: Right arm, Patient Position: Sitting, BP Cuff Size: Adult)   Pulse 96   Temp 35.6 °C (96 °F) (Temporal)   Resp 16   Ht 1.727 m (5' 8\")   Wt 114 kg (252 lb)   SpO2 97%   BMI 38.32 kg/m²   tains abnormal data Hemoglobin A1C  Order: [id number]  Status: Final result       Visible to patient: Yes (seen)       Dx: DM type 2 with diabetic peripheral ne...    2 Result Notes       1 HM Topic            Component  Ref Range & Units 2 mo ago  (10/25/23) 6 mo ago  (7/3/23) 10 mo ago  (3/14/23) 1 yr ago  (8/16/22) 1 yr ago  (5/3/22) 1 yr ago  (4/1/22) 1 yr ago  (3/30/22)   Hemoglobin A1C  see below % 6.1 High  5.8 " Abnormal  R, CM 5.9 Abnormal  R, CM 5.8 Abnormal  R, CM 6.4 High  R, CM 6.2 Abnormal  R, CM 6.5 High  R, CM   Estimated Average Glucose  Not Established mg/dL 128 120 R 123 R 120 R 137 R,  R 140 R, CM   Resulting Agency [facility]             Contains abnormal data Basic metabolic panel  Order: [id number]  Status: Final result       Visible to patient: Yes (seen)       Dx: DM type 2 with diabetic peripheral ne...    2 Result Notes            Component  Ref Range & Units 2 mo ago  (10/25/23) 6 mo ago  (7/3/23) 10 mo ago  (3/14/23) 1 yr ago  (11/21/22) 1 yr ago  (8/16/22) 1 yr ago  (4/1/22) 2 yr ago  (11/18/21)   Glucose  74 - 99 mg/dL 131 High  158 High  181 High  112 High  151 High  141 High  130 High    Sodium  136 - 145 mmol/L 140 138 138 138 140 140 139   Potassium  3.5 - 5.3 mmol/L 4.2 3.9 4.0 4.2 4.0 4.1 3.9   Chloride  98 - 107 mmol/L 103 101 99 100 101 102 102   Bicarbonate  21 - 32 mmol/L 30 26 31 31 31 30 26   Anion Gap  10 - 20 mmol/L 11 15 12 11 12 12 15   Urea Nitrogen  6 - 23 mg/dL 11 15 13 18 11 13 13   Creatinine  0.50 - 1.05 mg/dL 0.94 1.01 1.00 1.03 0.92 0.86 0.86   eGFR  >60 mL/min/1.73m*2 69         Comment: Calculations of estimated GFR are performed using the 2021 CKD-EPI Study Refit equation without the race variable for the IDMS-Traceable creatinine methods.  https://jasn.asnjournals.org/content/early/2021/09/22/ASN.3744501532   Calcium  8.6 - 10.3 mg/dL 9.2 9.5 9.7 9.5 9.3 9.3 9.5   Resulting [facility]              Contains abnormal data CBC  Order: [id number]  Status: Final result       Visible to patient: Yes (seen)       Dx: Systemic lupus erythematosus, unspeci...    0 Result Notes            Component  Ref Range & Units 2 mo ago  (10/25/23) 10 mo ago  (3/14/23) 1 yr ago  (11/21/22) 1 yr ago  (8/16/22) 2 yr ago  (11/12/21) 2 yr ago  (7/15/21) 2 yr ago  (5/10/21)   WBC  4.4 - 11.3 x10*3/uL 2.8 Low  3.7 Low  R 3.3 Low  R 2.4 Low  R 5.0 R 3.0 Low  R 2.9 Low  R   nRBC  0.0 - 0.0 /100 WBCs 0.0          RBC  4.00 - 5.20 x10*6/uL 3.88 Low  4.28 R 4.04 R 3.93 Low  R 4.18 R 4.26 R 3.94 Low  R   Hemoglobin  12.0 - 16.0 g/dL 12.2 13.2 12.4 11.6 Low  13.1 13.6 12.6   Hematocrit  36.0 - 46.0 % 36.9 39.0 38.3 36.8 40.3 40.4 37.8   MCV  80 - 100 fL 95 91 95 94 96 95 96   MCH  26.0 - 34.0 pg 31.4         MCHC  32.0 - 36.0 g/dL 33.1 33.8 32.4 31.5 Low  32.5 33.7 33.3   RDW  11.5 - 14.5 % 15.1 High  14.6 High  14.7 High  14.4 14.5 14.3 14.6 High    Platelets  150 - 450 x10*3/uL 91 Low  93 Low  R 95 Low  R 71 Low  R 128 Low  R 112 Low  R 89 Low  R   Comment: Platelet count verified by smear review.   MPV  7.5 - 11.5 fL 10.1         Resulting Agency [facility]                      C4 Complement  Order: [id number]  Status: Final result       Visible to patient: Yes (seen)       Dx: Systemic lupus erythematosus, unspeci...    0 Result Notes      Component  Ref Range & Units 2 mo ago   C4 Complement  10 - 50 mg/dL 11   Resulting Agency [facility]                 C-Reactive Protein  Order: [id number]  Status: Final result       Visible to patient: Yes (seen)       Dx: Systemic lupus erythematosus, unspeci...    0 Result Notes        Component  Ref Range & Units 2 mo ago 10 mo ago 3 yr ago   C-Reactive Protein  <1.00 mg/dL 0.54 0.19 R, CM 0.64 R, CM   Resulting Agency [facility]                             Physical Exam  VSS  Neck-no masses,nodes/jvd or thyromegaly  Pulm-clear w/o rales/rhonchi  Cor--rsr w/o m, gallop,ectopy  Pv-left brace on and no edema reduced touch/vib distal to ankle  Skin-no rashes/petechiae  Mood-stable; cognition-nl  Accuchecks 115-150 bid      Assessment/Plan   Problem List Items Addressed This Visit       DM type 2 with diabetic peripheral neuropathy (CMS/HCC)   Same humalog and metformin;same low carb/salt diet  Follow w/ Podiatry  And rheum  Needs repeat cbc/cmp/ldl  A1c/cbc 3 mos  No s/e on current rxs  continue current medications~  diet- decrease carbohydrates and sugar intake~  increase exercise as tolerated~  stay up to date with eye and foot exams~  call if any questions or concerns~follow up in 3-6 months~

## 2024-01-18 PROBLEM — E66.01 OBESITY, MORBID (MULTI): Status: ACTIVE | Noted: 2024-01-18

## 2024-01-18 PROBLEM — L97.524 NON-PRESSURE CHRONIC ULCER OF OTHER PART OF LEFT FOOT WITH NECROSIS OF BONE (MULTI): Status: ACTIVE | Noted: 2024-01-18

## 2024-01-18 ASSESSMENT — ENCOUNTER SYMPTOMS
NUMBNESS: 1
LIGHT-HEADEDNESS: 0
SHORTNESS OF BREATH: 0
DYSURIA: 0
FATIGUE: 0
FREQUENCY: 1
DIARRHEA: 1
ABDOMINAL PAIN: 0
WEAKNESS: 0
WHEEZING: 0
SLEEP DISTURBANCE: 0
DYSPHORIC MOOD: 0
FEVER: 0
UNEXPECTED WEIGHT CHANGE: 0
PALPITATIONS: 0
ABDOMINAL DISTENTION: 0
COUGH: 0
MYALGIAS: 1
BACK PAIN: 1
HEADACHES: 0
HEMATURIA: 0
ADENOPATHY: 0
ARTHRALGIAS: 1
CHEST TIGHTNESS: 0
BLOOD IN STOOL: 0

## 2024-02-09 ENCOUNTER — LAB (OUTPATIENT)
Dept: LAB | Facility: LAB | Age: 64
End: 2024-02-09
Payer: MEDICARE

## 2024-02-09 DIAGNOSIS — D72.819 SYSTEMIC LUPUS ERYTHEMATOSUS (SLE) WITH LEUKOPENIA (MULTI): ICD-10-CM

## 2024-02-09 DIAGNOSIS — D72.819 CHRONIC LEUKOPENIA: ICD-10-CM

## 2024-02-09 DIAGNOSIS — E11.42 DM TYPE 2 WITH DIABETIC PERIPHERAL NEUROPATHY (MULTI): ICD-10-CM

## 2024-02-09 DIAGNOSIS — M32.9 SYSTEMIC LUPUS ERYTHEMATOSUS, UNSPECIFIED SLE TYPE, UNSPECIFIED ORGAN INVOLVEMENT STATUS (MULTI): ICD-10-CM

## 2024-02-09 DIAGNOSIS — M32.19 SYSTEMIC LUPUS ERYTHEMATOSUS (SLE) WITH LEUKOPENIA (MULTI): ICD-10-CM

## 2024-02-09 LAB
ALBUMIN SERPL BCP-MCNC: 4.1 G/DL (ref 3.4–5)
ALP SERPL-CCNC: 76 U/L (ref 33–136)
ALT SERPL W P-5'-P-CCNC: 20 U/L (ref 7–45)
ANION GAP SERPL CALC-SCNC: 14 MMOL/L (ref 10–20)
AST SERPL W P-5'-P-CCNC: 28 U/L (ref 9–39)
BASOPHILS # BLD AUTO: 0.02 X10*3/UL (ref 0–0.1)
BASOPHILS NFR BLD AUTO: 0.5 %
BILIRUB SERPL-MCNC: 0.9 MG/DL (ref 0–1.2)
BUN SERPL-MCNC: 13 MG/DL (ref 6–23)
CALCIUM SERPL-MCNC: 9.1 MG/DL (ref 8.6–10.3)
CHLORIDE SERPL-SCNC: 101 MMOL/L (ref 98–107)
CO2 SERPL-SCNC: 29 MMOL/L (ref 21–32)
CREAT SERPL-MCNC: 0.94 MG/DL (ref 0.5–1.05)
EGFRCR SERPLBLD CKD-EPI 2021: 69 ML/MIN/1.73M*2
EOSINOPHIL # BLD AUTO: 0.08 X10*3/UL (ref 0–0.7)
EOSINOPHIL NFR BLD AUTO: 2.1 %
ERYTHROCYTE [DISTWIDTH] IN BLOOD BY AUTOMATED COUNT: 14.8 % (ref 11.5–14.5)
EST. AVERAGE GLUCOSE BLD GHB EST-MCNC: 114 MG/DL
GLUCOSE SERPL-MCNC: 131 MG/DL (ref 74–99)
HBA1C MFR BLD: 5.6 %
HCT VFR BLD AUTO: 39.4 % (ref 36–46)
HGB BLD-MCNC: 13.3 G/DL (ref 12–16)
IMM GRANULOCYTES # BLD AUTO: 0.01 X10*3/UL (ref 0–0.7)
IMM GRANULOCYTES NFR BLD AUTO: 0.3 % (ref 0–0.9)
LDLC SERPL DIRECT ASSAY-MCNC: 77 MG/DL (ref 0–129)
LYMPHOCYTES # BLD AUTO: 0.93 X10*3/UL (ref 1.2–4.8)
LYMPHOCYTES NFR BLD AUTO: 24.3 %
MCH RBC QN AUTO: 31.7 PG (ref 26–34)
MCHC RBC AUTO-ENTMCNC: 33.8 G/DL (ref 32–36)
MCV RBC AUTO: 94 FL (ref 80–100)
MONOCYTES # BLD AUTO: 0.27 X10*3/UL (ref 0.1–1)
MONOCYTES NFR BLD AUTO: 7 %
NEUTROPHILS # BLD AUTO: 2.52 X10*3/UL (ref 1.2–7.7)
NEUTROPHILS NFR BLD AUTO: 65.8 %
NRBC BLD-RTO: 0 /100 WBCS (ref 0–0)
PLATELET # BLD AUTO: 110 X10*3/UL (ref 150–450)
POTASSIUM SERPL-SCNC: 4.1 MMOL/L (ref 3.5–5.3)
PROT SERPL-MCNC: 7 G/DL (ref 6.4–8.2)
RBC # BLD AUTO: 4.19 X10*6/UL (ref 4–5.2)
SODIUM SERPL-SCNC: 140 MMOL/L (ref 136–145)
WBC # BLD AUTO: 3.8 X10*3/UL (ref 4.4–11.3)

## 2024-02-09 PROCEDURE — 36415 COLL VENOUS BLD VENIPUNCTURE: CPT

## 2024-02-09 PROCEDURE — 83721 ASSAY OF BLOOD LIPOPROTEIN: CPT

## 2024-02-09 PROCEDURE — 85025 COMPLETE CBC W/AUTO DIFF WBC: CPT

## 2024-02-09 PROCEDURE — 80053 COMPREHEN METABOLIC PANEL: CPT

## 2024-02-09 PROCEDURE — 83036 HEMOGLOBIN GLYCOSYLATED A1C: CPT

## 2024-04-02 ENCOUNTER — OFFICE VISIT (OUTPATIENT)
Dept: PRIMARY CARE | Facility: CLINIC | Age: 64
End: 2024-04-02
Payer: MEDICARE

## 2024-04-02 VITALS
SYSTOLIC BLOOD PRESSURE: 126 MMHG | OXYGEN SATURATION: 97 % | HEIGHT: 68 IN | BODY MASS INDEX: 37.89 KG/M2 | HEART RATE: 83 BPM | DIASTOLIC BLOOD PRESSURE: 74 MMHG | RESPIRATION RATE: 16 BRPM | WEIGHT: 250 LBS | TEMPERATURE: 96 F

## 2024-04-02 DIAGNOSIS — J01.00 ACUTE NON-RECURRENT MAXILLARY SINUSITIS: Primary | ICD-10-CM

## 2024-04-02 PROCEDURE — 3078F DIAST BP <80 MM HG: CPT | Performed by: FAMILY MEDICINE

## 2024-04-02 PROCEDURE — 3044F HG A1C LEVEL LT 7.0%: CPT | Performed by: FAMILY MEDICINE

## 2024-04-02 PROCEDURE — 3048F LDL-C <100 MG/DL: CPT | Performed by: FAMILY MEDICINE

## 2024-04-02 PROCEDURE — 99213 OFFICE O/P EST LOW 20 MIN: CPT | Performed by: FAMILY MEDICINE

## 2024-04-02 PROCEDURE — 3074F SYST BP LT 130 MM HG: CPT | Performed by: FAMILY MEDICINE

## 2024-04-02 RX ORDER — AMOXICILLIN 500 MG/1
500 CAPSULE ORAL 3 TIMES DAILY
Qty: 30 CAPSULE | Refills: 0 | Status: SHIPPED | OUTPATIENT
Start: 2024-04-02 | End: 2024-04-17 | Stop reason: ALTCHOICE

## 2024-04-02 NOTE — PROGRESS NOTES
"Subjective   Patient ID: Debra Marroquin is a 63 y.o. female who presents for Earache (Left ear) and Sore Throat (Left sided sore throat).  HPI  63-year-old female is here because of a 3-day history of progressive sore throat especially in the left deep sore throat throat area as well as deep left ear area.  Had accompanying rhinorrhea for about 1 week gratefully no significant chest pain short of breath cough.  No actual rash noted.  Otherwise remains on her anti-inflammatory medicines for her lupus-like syndrome.  Gratefully no high fevers striking headache taste or smell disturbance COVID test was negative no nausea vomiting diarrhea or rash related.  Chronic meds reviewed.  No reported side effects  Review of Systems   Constitutional:  Positive for fatigue. Negative for fever and unexpected weight change.   HENT:  Positive for ear pain, mouth sores, rhinorrhea, sinus pain and sore throat.    Eyes:  Negative for visual disturbance.   Respiratory:  Negative for cough, chest tightness, shortness of breath and wheezing.    Cardiovascular:  Negative for chest pain, palpitations and leg swelling.   Gastrointestinal:  Negative for abdominal pain and blood in stool.   Genitourinary:  Negative for dysuria, frequency and hematuria.   Musculoskeletal:  Positive for arthralgias and gait problem. Negative for myalgias, neck pain and neck stiffness.   Skin:  Negative for rash.   Neurological:  Negative for weakness and headaches.   Psychiatric/Behavioral:  Negative for behavioral problems and sleep disturbance.        Objective   /74 (BP Location: Right arm, Patient Position: Sitting, BP Cuff Size: Large adult)   Pulse 83   Temp 35.6 °C (96 °F) (Temporal)   Resp 16   Ht 1.727 m (5' 8\")   Wt 113 kg (250 lb)   SpO2 97%   BMI 38.01 kg/m²           Physical Exam  Vital signs reviewed and normal patient afebrile and pulse ox is good.  ENT eyes are clear PERRLA bilaterally nose shows some deviation with yellow rhinorrhea " left greater than right minimal tenderness left maxillary sinus left TM does show mild hyperemia superiorly with retraction air-fluid level canals patent right TM is retracted only oral exam shows mild adenopathy as well as injection of the left palate and posterior throat and lingual tonsil no exudate noted no masses  Neck neck is supple with very minimal anterior adenopathy otherwise no worrisome masses adenopathy or rigidity  Chest chest is clear without wheezing rales or rhonchi  Cardiovascular RSR without murmurs gallop or ectopy  Skin no rashes petechiae or jaundice vascular no striking edema symmetrically or asymmetrically.      Assessment/Plan   Problem List Items Addressed This Visit    None    Otitis media sinusitis and deep pharyngitis will use salt water gargle 2-3 times a day.  May use throat lozenges.  Will place on Amoxil at her request for 10 days use side effects reviewed May continue nasal steroids and alternating with nasal saline.  Continue above medicines chronically  Call if interval worsening in the next 7 to 10 days otherwise follow-up in 3 months.  Patient will continue follow-up with her rheumatologist regards to her lupus syndrome  @discharge  The above diagnosis and treatment plan was discussed with the patient patient will continue appropriate diet and exercise as reviewed  Patient will recheck earlier if any interval problems of significance or clinical worsening of the above problems.  Agrees above surveillance.  All question were addressed regarding above meds

## 2024-04-07 ASSESSMENT — ENCOUNTER SYMPTOMS
FEVER: 0
SINUS PAIN: 1
RHINORRHEA: 1
NECK STIFFNESS: 0
WHEEZING: 0
PALPITATIONS: 0
SORE THROAT: 1
WEAKNESS: 0
FATIGUE: 1
UNEXPECTED WEIGHT CHANGE: 0
DYSURIA: 0
HEMATURIA: 0
FREQUENCY: 0
COUGH: 0
ARTHRALGIAS: 1
HEADACHES: 0
ABDOMINAL PAIN: 0
MYALGIAS: 0
BLOOD IN STOOL: 0
CHEST TIGHTNESS: 0
NECK PAIN: 0
SLEEP DISTURBANCE: 0
SHORTNESS OF BREATH: 0

## 2024-04-17 ENCOUNTER — OFFICE VISIT (OUTPATIENT)
Dept: PRIMARY CARE | Facility: CLINIC | Age: 64
End: 2024-04-17
Payer: MEDICARE

## 2024-04-17 VITALS
RESPIRATION RATE: 16 BRPM | TEMPERATURE: 96.4 F | OXYGEN SATURATION: 96 % | WEIGHT: 247 LBS | HEIGHT: 68 IN | DIASTOLIC BLOOD PRESSURE: 80 MMHG | HEART RATE: 74 BPM | SYSTOLIC BLOOD PRESSURE: 118 MMHG | BODY MASS INDEX: 37.44 KG/M2

## 2024-04-17 DIAGNOSIS — D72.819 SYSTEMIC LUPUS ERYTHEMATOSUS (SLE) WITH LEUKOPENIA (MULTI): ICD-10-CM

## 2024-04-17 DIAGNOSIS — M32.19 SYSTEMIC LUPUS ERYTHEMATOSUS (SLE) WITH LEUKOPENIA (MULTI): ICD-10-CM

## 2024-04-17 DIAGNOSIS — M14.679 CHARCOT'S JOINT, UNSPECIFIED ANKLE AND FOOT: ICD-10-CM

## 2024-04-17 DIAGNOSIS — I10 BENIGN HYPERTENSION: ICD-10-CM

## 2024-04-17 DIAGNOSIS — Z00.00 ROUTINE GENERAL MEDICAL EXAMINATION AT HEALTH CARE FACILITY: Primary | ICD-10-CM

## 2024-04-17 DIAGNOSIS — E11.42 DM TYPE 2 WITH DIABETIC PERIPHERAL NEUROPATHY (MULTI): ICD-10-CM

## 2024-04-17 DIAGNOSIS — Z00.00 MEDICARE ANNUAL WELLNESS VISIT, SUBSEQUENT: ICD-10-CM

## 2024-04-17 PROCEDURE — 3048F LDL-C <100 MG/DL: CPT | Performed by: FAMILY MEDICINE

## 2024-04-17 PROCEDURE — 3074F SYST BP LT 130 MM HG: CPT | Performed by: FAMILY MEDICINE

## 2024-04-17 PROCEDURE — 3079F DIAST BP 80-89 MM HG: CPT | Performed by: FAMILY MEDICINE

## 2024-04-17 PROCEDURE — 99212 OFFICE O/P EST SF 10 MIN: CPT | Performed by: FAMILY MEDICINE

## 2024-04-17 PROCEDURE — G0439 PPPS, SUBSEQ VISIT: HCPCS | Performed by: FAMILY MEDICINE

## 2024-04-17 PROCEDURE — 3044F HG A1C LEVEL LT 7.0%: CPT | Performed by: FAMILY MEDICINE

## 2024-04-17 PROCEDURE — 1036F TOBACCO NON-USER: CPT | Performed by: FAMILY MEDICINE

## 2024-04-17 RX ORDER — INSULIN LISPRO 100 [IU]/ML
INJECTION, SUSPENSION SUBCUTANEOUS
Qty: 15 ML | Refills: 3 | Status: SHIPPED | OUTPATIENT
Start: 2024-04-17

## 2024-04-17 ASSESSMENT — ACTIVITIES OF DAILY LIVING (ADL)
MANAGING_FINANCES: INDEPENDENT
DRESSING: INDEPENDENT
GROCERY_SHOPPING: INDEPENDENT
BATHING: INDEPENDENT
TAKING_MEDICATION: INDEPENDENT
DOING_HOUSEWORK: INDEPENDENT

## 2024-04-17 ASSESSMENT — ENCOUNTER SYMPTOMS
DEPRESSION: 0
OCCASIONAL FEELINGS OF UNSTEADINESS: 0
LOSS OF SENSATION IN FEET: 0

## 2024-04-17 NOTE — PROGRESS NOTES
Subjective   Reason for Visit: Debra Marroquin is an 63 y.o. female here for a Medicare Wellness visit.               HPI  63-year-old lady with a history of Charcot disease of the left ankle as well as type 2 diabetes on insulin and also lupus-like syndrome the patient takes 30 to 36 units twice a day of 75/25 Humalog insulin.  Otherwise morning sugars range between 95 and 130 depending on diet  The patient also takes metformin twice a day without GI side effects.  Does take her Plaquenil twice a day and is followed by rheumatology for her lupus-like syndrome CBC is improved with her chronic leukopenia with no evidence of anemia.  She does take supplemental vitamin B12, C, D, and iron.  Is a cock-up splint for Charcot disease of the left ankle.  Is followed by podiatry as well  Lasix for hypertension peripheral edema declined statins in the past.  Patient denies any chest pain short of breath or palpitations.  Mood is stable.  36/36  Patient Care Team:  Michael Burnette DO as PCP - General  Michael Burnette DO as PCP - [employer] PCP     Review of Systems   Constitutional:  Negative for fatigue, fever and unexpected weight change.   HENT:  Positive for rhinorrhea. Negative for sinus pressure, sinus pain and voice change.    Eyes:  Negative for visual disturbance.   Respiratory:  Negative for cough, chest tightness, shortness of breath and wheezing.    Cardiovascular:  Negative for chest pain, palpitations and leg swelling.   Gastrointestinal:  Negative for abdominal pain, blood in stool and vomiting.   Genitourinary:  Positive for frequency. Negative for dysuria and hematuria.   Musculoskeletal:  Positive for arthralgias, gait problem and myalgias.   Skin:  Negative for rash.   Neurological:  Positive for weakness and numbness. Negative for tremors, light-headedness and headaches.   Hematological:  Negative for adenopathy.   Psychiatric/Behavioral:  Negative for behavioral problems, dysphoric mood, sleep  "disturbance and suicidal ideas.        Objective   Vitals:  /80 (BP Location: Right arm, Patient Position: Sitting, BP Cuff Size: Large adult)   Pulse 74   Temp 35.8 °C (96.4 °F) (Temporal)   Resp 16   Ht 1.727 m (5' 8\")   Wt 112 kg (247 lb)   SpO2 96%   BMI 37.56 kg/m²     LDL cholesterol, direct  Order: [id number]   Status: Final result       Visible to patient: Yes (seen)       Dx: DM type 2 with diabetic peripheral ne...    2 Result Notes       1  Topic      Component  Ref Range & Units 2 mo ago   LDL, Direct  0 - 129 mg/dL 77   Resulting Agency [facility]              Narrative         Contains abnormal data Comprehensive metabolic panel  Order: [id number]   Status: Final result       Visible to patient: Yes (seen)       Dx: DM type 2 with diabetic peripheral ne...    2 Result Notes            Component  Ref Range & Units 2 mo ago  (2/9/24) 6 mo ago  (10/25/23) 9 mo ago  (7/3/23) 1 yr ago  (3/14/23) 1 yr ago  (11/21/22) 1 yr ago  (8/16/22) 2 yr ago  (4/1/22)   Glucose  74 - 99 mg/dL 131 High  131 High  158 High  181 High  112 High  151 High  141 High    Sodium  136 - 145 mmol/L 140 140 138 138 138 140 140   Potassium  3.5 - 5.3 mmol/L 4.1 4.2 3.9 4.0 4.2 4.0 4.1   Chloride  98 - 107 mmol/L 101 103 101 99 100 101 102   Bicarbonate  21 - 32 mmol/L 29 30 26 31 31 31 30   Anion Gap  10 - 20 mmol/L 14 11 15 12 11 12 12   Urea Nitrogen  6 - 23 mg/dL 13 11 15 13 18 11 13   Creatinine  0.50 - 1.05 mg/dL 0.94 0.94 1.01 1.00 1.03 0.92 0.86   eGFR  >60 mL/min/1.73m*2 69 69 CM        Comment: Calculations of estimated GFR are performed using the 2021 CKD-EPI Study Refit equation without the race variable for the IDMS-Traceable creatinine methods.  https://jasn.asnjournals.org/content/early/2021/09/22/ASN.2996342308   Calcium  8.6 - 10.3 mg/dL 9.1 9.2 9.5 9.7 9.5 9.3 9.3   Albumin  3.4 - 5.0 g/dL 4.1  4.4 4.3  4.1 3.5   Alkaline Phosphatase  33 - 136 U/L 76  83 66  103 73   Total Protein  6.4 - 8.2 g/dL 7.0  7.6 7.9 "  7.4 7.6   AST  9 - 39 U/L 28  30 30  41 High  21   Bilirubin, Total  0.0 - 1.2 mg/dL 0.9  0.9 1.1  0.6 0.7   ALT  7 - 45 U/L 20            oglobin A1c  Order: [id number]   Status: Final result       Visible to patient: Yes (seen)       Dx: DM type 2 with diabetic peripheral ne...    2 Result Notes       1 HM Topic            Component  Ref Range & Units 2 mo ago  (2/9/24) 6 mo ago  (10/25/23) 9 mo ago  (7/3/23) 1 yr ago  (3/14/23) 1 yr ago  (8/16/22) 1 yr ago  (5/3/22) 2 yr ago  (4/1/22)   Hemoglobin A1C  see below % 5.6 6.1 High  5.8 Abnormal  R, CM 5.9 Abnormal  R, CM 5.8 Abnormal  R, CM 6.4 High  R, CM 6.2 Abnormal  R, CM   Estimated Average Glucose  Not Established mg/dL 11            rential  Order: [id number]   Status: Final result       Visible to patient: Yes (seen)       Dx: Systemic lupus erythematosus, unspeci...    2 Result Notes              Component  Ref Range & Units 2 mo ago  (2/9/24) 6 mo ago  (10/25/23) 1 yr ago  (3/14/23) 1 yr ago  (11/21/22) 1 yr ago  (8/16/22) 2 yr ago  (11/12/21) 2 yr ago  (7/15/21)   WBC  4.4 - 11.3 x10*3/uL 3.8 Low  2.8 Low  3.7 Low  R 3.3 Low  R 2.4 Low  R 5.0 R 3.0 Low  R   nRBC  0.0 - 0.0 /100 WBCs 0.0 0.0        RBC  4.00 - 5.20 x10*6/uL 4.19 3.88 Low  4.28 R 4.04 R 3.93 Low  R 4.18 R 4.26 R   Hemoglobin  12.0 - 16.0 g/dL 13.3 12.2 13.2 12.4 11.6 Low  13.1 13.6   Hematocrit  36.0 - 46.0 % 39.4 36.9 39.0 38.3 36.8 40.3 40.4   MCV  80 - 100 fL 94 95 91 95 94 96 95   MCH  26.0 - 34.0 pg 31.7 31.4        MCHC  32.0 - 36.0 g/dL 33.8 33.1 33.8 32.4 31.5 Low  32.5 33.7   RDW  11.5 - 14.5 % 14.8 High  15.1 High  14.6 High  14.7 High  14.4 14.5 14.3   Platelets  150 - 450 x10*3/uL 110 Low  91 Low  CM 93 Low  R 95 Low  R 71 Low  R 128 Low  R 112 Low  R   Neutrophils %  40.0 - 80.0 % 65.8  70.5 54.8 66.0 72.9 63.2   Immature Granulocytes %, Automated  0.0 - 0.9 % 0.3              Physical Exam  Vital signs reviewed and normal  Neck neck is all without mass adenopathy bruits or  rigidity  Pulmonary chest is clear anteriorly and posteriorly  Cardiovascular RSR without significant murmurs gallop or ectopy  Ent-much less turbinate swelling today and only clear rhinorrhea no thick exudate sinuses nontender TMs scarred retracted without redness oral exam is benign  Assessment/Plan   Problem List Items Addressed This Visit       DM type 2 with diabetic peripheral neuropathy (Multi)   A1c at 5.6 and sugars are 130 otherwise we will continue to follow a low-salt low carbohydrate and low-fat diet LDL is normal has declined statins in the past  Will continue albuterol if needed but otherwise continue her chronic Plaquenil the direction of her rheumatologist.  Nice to see your white counts went from [id number] with improved platelet count of 110,000 otherwise kidney functions liver functions have been stable  Patient declines shingles vaccine at this time but up-to-date on pneumococcus  After careful review declines all colon cancer screening and also mammography screening will continue SBE and notify of any change.  Also notify of any change in appearance or frequency of stool.  Follow-up in 3 months we will recheck CBC later but otherwise continue her chronic medicines as above stable exam follow-up with a podiatrist with her Pap cock up splint for her left foot Charcot disease we will check a BMP and A1c before visit in 3 to 4 months  @discharge  The above diagnosis and treatment plan was discussed with the patient patient will continue appropriate diet and exercise as reviewed  Patient will recheck earlier if any interval problems of significance or clinical worsening of the above problems.  Agrees above surveillance.  All question were addressed regarding above meds

## 2024-04-22 PROBLEM — L97.524 NON-PRESSURE CHRONIC ULCER OF OTHER PART OF LEFT FOOT WITH NECROSIS OF BONE (MULTI): Status: RESOLVED | Noted: 2024-01-18 | Resolved: 2024-04-22

## 2024-04-22 PROBLEM — Z00.00 MEDICARE ANNUAL WELLNESS VISIT, SUBSEQUENT: Status: ACTIVE | Noted: 2024-04-22

## 2024-04-22 ASSESSMENT — ENCOUNTER SYMPTOMS
FATIGUE: 0
VOICE CHANGE: 0
WHEEZING: 0
SHORTNESS OF BREATH: 0
CHEST TIGHTNESS: 0
ADENOPATHY: 0
HEADACHES: 0
WEAKNESS: 1
TREMORS: 0
VOMITING: 0
FEVER: 0
NUMBNESS: 1
PALPITATIONS: 0
ABDOMINAL PAIN: 0
SLEEP DISTURBANCE: 0
HEMATURIA: 0
SINUS PRESSURE: 0
FREQUENCY: 1
ARTHRALGIAS: 1
LIGHT-HEADEDNESS: 0
RHINORRHEA: 1
SINUS PAIN: 0
UNEXPECTED WEIGHT CHANGE: 0
MYALGIAS: 1
COUGH: 0
DYSPHORIC MOOD: 0
BLOOD IN STOOL: 0
DYSURIA: 0

## 2024-06-04 ENCOUNTER — LAB (OUTPATIENT)
Dept: LAB | Facility: LAB | Age: 64
End: 2024-06-04
Payer: MEDICARE

## 2024-06-04 ENCOUNTER — OFFICE VISIT (OUTPATIENT)
Dept: RHEUMATOLOGY | Facility: CLINIC | Age: 64
End: 2024-06-04
Payer: MEDICARE

## 2024-06-04 VITALS
BODY MASS INDEX: 37.13 KG/M2 | DIASTOLIC BLOOD PRESSURE: 67 MMHG | WEIGHT: 245 LBS | HEART RATE: 77 BPM | SYSTOLIC BLOOD PRESSURE: 119 MMHG | HEIGHT: 68 IN

## 2024-06-04 DIAGNOSIS — M32.9 SYSTEMIC LUPUS ERYTHEMATOSUS, UNSPECIFIED SLE TYPE, UNSPECIFIED ORGAN INVOLVEMENT STATUS (MULTI): ICD-10-CM

## 2024-06-04 DIAGNOSIS — M32.9 SYSTEMIC LUPUS ERYTHEMATOSUS, UNSPECIFIED SLE TYPE, UNSPECIFIED ORGAN INVOLVEMENT STATUS (MULTI): Primary | ICD-10-CM

## 2024-06-04 LAB
ALBUMIN SERPL BCP-MCNC: 4.3 G/DL (ref 3.4–5)
ALP SERPL-CCNC: 74 U/L (ref 33–136)
ALT SERPL W P-5'-P-CCNC: 20 U/L (ref 7–45)
ANION GAP SERPL CALC-SCNC: 11 MMOL/L (ref 10–20)
APPEARANCE UR: CLEAR
AST SERPL W P-5'-P-CCNC: 29 U/L (ref 9–39)
BASOPHILS # BLD AUTO: 0.03 X10*3/UL (ref 0–0.1)
BASOPHILS NFR BLD AUTO: 0.8 %
BILIRUB SERPL-MCNC: 1.2 MG/DL (ref 0–1.2)
BILIRUB UR STRIP.AUTO-MCNC: NEGATIVE MG/DL
BUN SERPL-MCNC: 12 MG/DL (ref 6–23)
C3 SERPL-MCNC: 136 MG/DL (ref 87–200)
C4 SERPL-MCNC: 14 MG/DL (ref 10–50)
CALCIUM SERPL-MCNC: 9.4 MG/DL (ref 8.6–10.3)
CHLORIDE SERPL-SCNC: 102 MMOL/L (ref 98–107)
CO2 SERPL-SCNC: 27 MMOL/L (ref 21–32)
COLOR UR: YELLOW
CREAT SERPL-MCNC: 0.91 MG/DL (ref 0.5–1.05)
CREAT UR-MCNC: 37.9 MG/DL (ref 20–320)
CRP SERPL-MCNC: 0.19 MG/DL
DSDNA AB SER-ACNC: 9 IU/ML
EGFRCR SERPLBLD CKD-EPI 2021: 71 ML/MIN/1.73M*2
EOSINOPHIL # BLD AUTO: 0.07 X10*3/UL (ref 0–0.7)
EOSINOPHIL NFR BLD AUTO: 1.8 %
ERYTHROCYTE [DISTWIDTH] IN BLOOD BY AUTOMATED COUNT: 14.4 % (ref 11.5–14.5)
ERYTHROCYTE [SEDIMENTATION RATE] IN BLOOD BY WESTERGREN METHOD: 19 MM/H (ref 0–30)
GLUCOSE SERPL-MCNC: 135 MG/DL (ref 74–99)
GLUCOSE UR STRIP.AUTO-MCNC: NEGATIVE MG/DL
HCT VFR BLD AUTO: 37.1 % (ref 36–46)
HGB BLD-MCNC: 13 G/DL (ref 12–16)
IMM GRANULOCYTES # BLD AUTO: 0.02 X10*3/UL (ref 0–0.7)
IMM GRANULOCYTES NFR BLD AUTO: 0.5 % (ref 0–0.9)
KETONES UR STRIP.AUTO-MCNC: NEGATIVE MG/DL
LEUKOCYTE ESTERASE UR QL STRIP.AUTO: NEGATIVE
LYMPHOCYTES # BLD AUTO: 0.98 X10*3/UL (ref 1.2–4.8)
LYMPHOCYTES NFR BLD AUTO: 25.8 %
MCH RBC QN AUTO: 32.1 PG (ref 26–34)
MCHC RBC AUTO-ENTMCNC: 35 G/DL (ref 32–36)
MCV RBC AUTO: 92 FL (ref 80–100)
MONOCYTES # BLD AUTO: 0.25 X10*3/UL (ref 0.1–1)
MONOCYTES NFR BLD AUTO: 6.6 %
NEUTROPHILS # BLD AUTO: 2.45 X10*3/UL (ref 1.2–7.7)
NEUTROPHILS NFR BLD AUTO: 64.5 %
NITRITE UR QL STRIP.AUTO: NEGATIVE
NRBC BLD-RTO: 0 /100 WBCS (ref 0–0)
PH UR STRIP.AUTO: 7 [PH]
PLATELET # BLD AUTO: 112 X10*3/UL (ref 150–450)
POTASSIUM SERPL-SCNC: 3.9 MMOL/L (ref 3.5–5.3)
PROT SERPL-MCNC: 7.2 G/DL (ref 6.4–8.2)
PROT UR STRIP.AUTO-MCNC: NEGATIVE MG/DL
PROT UR-ACNC: <4 MG/DL (ref 5–24)
PROT/CREAT UR: ABNORMAL MG/G{CREAT}
RBC # BLD AUTO: 4.05 X10*6/UL (ref 4–5.2)
RBC # UR STRIP.AUTO: NEGATIVE /UL
SODIUM SERPL-SCNC: 136 MMOL/L (ref 136–145)
SP GR UR STRIP.AUTO: 1
UROBILINOGEN UR STRIP.AUTO-MCNC: <2 MG/DL
WBC # BLD AUTO: 3.8 X10*3/UL (ref 4.4–11.3)

## 2024-06-04 PROCEDURE — 80053 COMPREHEN METABOLIC PANEL: CPT

## 2024-06-04 PROCEDURE — 3078F DIAST BP <80 MM HG: CPT | Performed by: INTERNAL MEDICINE

## 2024-06-04 PROCEDURE — 81003 URINALYSIS AUTO W/O SCOPE: CPT

## 2024-06-04 PROCEDURE — 86140 C-REACTIVE PROTEIN: CPT

## 2024-06-04 PROCEDURE — 36415 COLL VENOUS BLD VENIPUNCTURE: CPT

## 2024-06-04 PROCEDURE — 3048F LDL-C <100 MG/DL: CPT | Performed by: INTERNAL MEDICINE

## 2024-06-04 PROCEDURE — 1036F TOBACCO NON-USER: CPT | Performed by: INTERNAL MEDICINE

## 2024-06-04 PROCEDURE — 99213 OFFICE O/P EST LOW 20 MIN: CPT | Performed by: INTERNAL MEDICINE

## 2024-06-04 PROCEDURE — 86160 COMPLEMENT ANTIGEN: CPT

## 2024-06-04 PROCEDURE — 3074F SYST BP LT 130 MM HG: CPT | Performed by: INTERNAL MEDICINE

## 2024-06-04 PROCEDURE — 84156 ASSAY OF PROTEIN URINE: CPT

## 2024-06-04 PROCEDURE — 86225 DNA ANTIBODY NATIVE: CPT

## 2024-06-04 PROCEDURE — 82570 ASSAY OF URINE CREATININE: CPT

## 2024-06-04 PROCEDURE — 3044F HG A1C LEVEL LT 7.0%: CPT | Performed by: INTERNAL MEDICINE

## 2024-06-04 PROCEDURE — 85652 RBC SED RATE AUTOMATED: CPT

## 2024-06-04 PROCEDURE — 85025 COMPLETE CBC W/AUTO DIFF WBC: CPT

## 2024-06-04 RX ORDER — HYDROXYCHLOROQUINE SULFATE 200 MG/1
200 TABLET, FILM COATED ORAL
Qty: 60 TABLET | Refills: 11 | Status: SHIPPED | OUTPATIENT
Start: 2024-06-04 | End: 2025-05-30

## 2024-06-04 ASSESSMENT — PAIN SCALES - GENERAL: PAINLEVEL: 0-NO PAIN

## 2024-06-04 NOTE — PROGRESS NOTES
Subjective   Patient ID: Debra Marroquin is a 64 y.o. female who presents for Follow-up (Follow up visit. Pain in both hands).    HPI  64 yr old WF with H/O SLE. She has leukopenia/lymphopenia, anti-DNA antibody positive and low C4. She has been on Plaquenil for several years.  She reports bilateral shoulder pain with L> R. Hurts to lay on them at night. No morning stiffness. Knees get stiff with prolonged sitting. She has OA in the knees.   She is able to make a fist.   No mouth sores, rash or photosensitivity, chest pain, Raynaud's or dry mouth.   Has dry eyes.      DEXA: 05/23:  Total femur T score: 1.8  Femur neck T score: 0.6  Spine L1-4 T score: 2.3  AN 1/160  dsDNA 37, C3, C4 NL, TRESA neg    Interval history:  She reports intermittent hand joint pain, stiffness, but denies joint swelling.  She has also intermittent neck and shoulder pain at right site  She reports dry eyes and dry skin, dry mouth  Her recent eye exam for HCQ was in 09/23     ROS  Joint pain in hands: negative   Joint swelling: negative  Morning stiffness and duration: negative   strength: normal  Oral ulcer: negative  Genital ulcer: negative  Raynaud phenomenon: negative  Chest pain/dyspnea: negative  Low back pain: negative  Visual problem: negative  Dry eyes/dry mouth: negative  Skin rash/scaling/psoriasis: negative       Objective     PEXAM  VS reviewed, WNL  General: Alert, no distress   HEENT: Normocephalic/atraumatic, No alopecia. PERRLA. Sclera white, conjunctiva pink, no malar rash. no oral or nasal ulcer. Oral cavity pink and moist, no erythema or exudate, dentition good.   Neck: supple  Respiratory: CTA B, no adventitious breath sounds  Cardiac: RRR, no murmurs, carotid, or bruits  Abdominal: symmetrical, soft, non-tender, non-distended, normoactive BSx4 quadrants, no CVA tenderness or suprapubic tenderness  MSK: Joints of upper and lower extremities were assessed for synovitis and ROM.    Today she has no evidence of synovitis in  the joints of her hands or wrists, tender joint count 0, swollen joint count 0   Extremities: no clubbing, no cyanosis, no edema  Skin: Skin warm and moist.   Neuro: non-focal, Strength 5/5 throughout. Normal gait. No cerebellar pathologic exam     Assessment/Plan   64 yr old with SLE which is in remission.   Continue Plaquenil. 400  Recent eyexam in 09/23, NL     Right shoulder subacromial bursitis: Her pain is intermittent and not severe now      Knee OA: Continue Tylenol arthritis.      DXA came NL     -will check her lupus markers  -will see her in 8-9 months

## 2024-06-12 ENCOUNTER — TELEPHONE (OUTPATIENT)
Dept: PRIMARY CARE | Facility: CLINIC | Age: 64
End: 2024-06-12
Payer: MEDICARE

## 2024-06-12 DIAGNOSIS — J01.00 ACUTE NON-RECURRENT MAXILLARY SINUSITIS: Primary | ICD-10-CM

## 2024-06-12 RX ORDER — DOXYCYCLINE 100 MG/1
100 CAPSULE ORAL
Qty: 20 CAPSULE | Refills: 0 | Status: SHIPPED | OUTPATIENT
Start: 2024-06-12 | End: 2024-06-22

## 2024-06-12 NOTE — TELEPHONE ENCOUNTER
Patient called stating that she has and ear ache and is wanting a medication called in   Please advise

## 2024-06-20 DIAGNOSIS — I10 BENIGN HYPERTENSION: ICD-10-CM

## 2024-06-20 RX ORDER — FUROSEMIDE 20 MG/1
20 TABLET ORAL DAILY
Qty: 90 TABLET | Refills: 0 | Status: SHIPPED | OUTPATIENT
Start: 2024-06-20

## 2024-06-25 ENCOUNTER — APPOINTMENT (OUTPATIENT)
Dept: RHEUMATOLOGY | Facility: CLINIC | Age: 64
End: 2024-06-25
Payer: MEDICARE

## 2024-07-23 ENCOUNTER — LAB (OUTPATIENT)
Dept: LAB | Facility: LAB | Age: 64
End: 2024-07-23
Payer: MEDICARE

## 2024-07-23 DIAGNOSIS — E11.42 DM TYPE 2 WITH DIABETIC PERIPHERAL NEUROPATHY (MULTI): ICD-10-CM

## 2024-07-23 LAB
ANION GAP SERPL CALC-SCNC: 12 MMOL/L (ref 10–20)
BUN SERPL-MCNC: 15 MG/DL (ref 6–23)
CALCIUM SERPL-MCNC: 9 MG/DL (ref 8.6–10.3)
CHLORIDE SERPL-SCNC: 103 MMOL/L (ref 98–107)
CO2 SERPL-SCNC: 28 MMOL/L (ref 21–32)
CREAT SERPL-MCNC: 0.93 MG/DL (ref 0.5–1.05)
EGFRCR SERPLBLD CKD-EPI 2021: 69 ML/MIN/1.73M*2
EST. AVERAGE GLUCOSE BLD GHB EST-MCNC: 111 MG/DL
GLUCOSE SERPL-MCNC: 141 MG/DL (ref 74–99)
HBA1C MFR BLD: 5.5 %
POTASSIUM SERPL-SCNC: 4 MMOL/L (ref 3.5–5.3)
SODIUM SERPL-SCNC: 139 MMOL/L (ref 136–145)

## 2024-07-23 PROCEDURE — 80048 BASIC METABOLIC PNL TOTAL CA: CPT

## 2024-07-23 PROCEDURE — 36415 COLL VENOUS BLD VENIPUNCTURE: CPT

## 2024-07-23 PROCEDURE — 83036 HEMOGLOBIN GLYCOSYLATED A1C: CPT

## 2024-08-05 ENCOUNTER — APPOINTMENT (OUTPATIENT)
Dept: PRIMARY CARE | Facility: CLINIC | Age: 64
End: 2024-08-05
Payer: MEDICARE

## 2024-08-05 VITALS
HEIGHT: 68 IN | TEMPERATURE: 96.8 F | WEIGHT: 247 LBS | OXYGEN SATURATION: 97 % | BODY MASS INDEX: 37.44 KG/M2 | RESPIRATION RATE: 16 BRPM | HEART RATE: 68 BPM | DIASTOLIC BLOOD PRESSURE: 78 MMHG | SYSTOLIC BLOOD PRESSURE: 122 MMHG

## 2024-08-05 DIAGNOSIS — E11.42 DM TYPE 2 WITH DIABETIC PERIPHERAL NEUROPATHY (MULTI): Primary | ICD-10-CM

## 2024-08-05 DIAGNOSIS — M32.19 SYSTEMIC LUPUS ERYTHEMATOSUS (SLE) WITH LEUKOPENIA (MULTI): ICD-10-CM

## 2024-08-05 DIAGNOSIS — D72.819 SYSTEMIC LUPUS ERYTHEMATOSUS (SLE) WITH LEUKOPENIA (MULTI): ICD-10-CM

## 2024-08-05 DIAGNOSIS — I10 BENIGN HYPERTENSION: ICD-10-CM

## 2024-08-05 DIAGNOSIS — D72.819 CHRONIC LEUKOPENIA: ICD-10-CM

## 2024-08-05 PROCEDURE — 99214 OFFICE O/P EST MOD 30 MIN: CPT | Performed by: FAMILY MEDICINE

## 2024-08-05 PROCEDURE — 3062F POS MACROALBUMINURIA REV: CPT | Performed by: FAMILY MEDICINE

## 2024-08-05 PROCEDURE — 3044F HG A1C LEVEL LT 7.0%: CPT | Performed by: FAMILY MEDICINE

## 2024-08-05 PROCEDURE — 3078F DIAST BP <80 MM HG: CPT | Performed by: FAMILY MEDICINE

## 2024-08-05 PROCEDURE — 3074F SYST BP LT 130 MM HG: CPT | Performed by: FAMILY MEDICINE

## 2024-08-05 PROCEDURE — 3048F LDL-C <100 MG/DL: CPT | Performed by: FAMILY MEDICINE

## 2024-08-05 PROCEDURE — 3008F BODY MASS INDEX DOCD: CPT | Performed by: FAMILY MEDICINE

## 2024-08-05 RX ORDER — INSULIN LISPRO 100 [IU]/ML
INJECTION, SUSPENSION SUBCUTANEOUS
Qty: 15 ML | Refills: 3 | Status: SHIPPED | OUTPATIENT
Start: 2024-08-05

## 2024-08-05 NOTE — PROGRESS NOTES
Subjective   Patient ID: Debra Marroquin is a 64 y.o. female who presents for Diabetes Mellitus (3 month follow up).  HPI  64-year-old female history of insulin diabetes chronic leukopenia from her systemic lupus with stable arthralgias stable Charcot joint of the left foot and stable hypertension.  Her cholesterols been normal and remains on her now 30 units instead of 40 units of 75/25 insulin twice a day in addition to her metformin Home sugars range from .  Attention to diet i.e. low-salt low carbohydrate low-fat diet is good  Is followed by podiatry for Charcot joint and does use her cock up splint for her left foot but no increase in edema in this area.  No new fracture.  Denies any chest pain short of breath or palpitations either at rest or with exertion no new GI/ issues.  Follows ophthalmology in regular basis because of Plaquenil that she takes 200 mg twice a day for lupus.  White count is actually improved and platelet count is stable.  1C is excellent at 5.5 Oarrs sugars like 1 34-1 40 stable kidney functions urine protein is normal gratefully anti-DNA is reducing and recent C-reactive protein was also normal.  Complement was also normal is followed regularly by rheumatology  Does take Lasix 20 mg for low-grade hypertension well as peripheral edema not tolerate ACE inhibitors earlier and declined statin therapy at this time because always normal cholesterol.  Review of Systems   Constitutional:  Negative for fatigue, fever and unexpected weight change.   Eyes:  Negative for visual disturbance.   Respiratory:  Negative for cough, chest tightness, shortness of breath and wheezing.    Cardiovascular:  Negative for chest pain, palpitations and leg swelling.   Gastrointestinal:  Negative for abdominal pain, blood in stool and vomiting.   Genitourinary:  Positive for frequency. Negative for dysuria, flank pain and hematuria.   Musculoskeletal:  Positive for arthralgias and myalgias.   Skin:  Negative  "for rash.   Neurological:  Positive for speech difficulty, weakness (Ankle and foot because of Charcot disease of the left foot) and numbness. Negative for syncope, light-headedness and headaches.   Hematological:  Negative for adenopathy.   Psychiatric/Behavioral:  Negative for behavioral problems, confusion, sleep disturbance and suicidal ideas.        Objective   /78 (BP Location: Right arm, Patient Position: Sitting, BP Cuff Size: Large adult)   Pulse 68   Temp 36 °C (96.8 °F) (Temporal)   Resp 16   Ht 1.727 m (5' 8\")   Wt 112 kg (247 lb)   SpO2 97%   BMI 37.56 kg/m²   Hemoglobin A1c  Order: [id number]   Status: Final result       Visible to patient: Yes (seen)       Dx: DM type 2 with diabetic peripheral ne...    2 Result Notes       1  Topic            Component  Ref Range & Units 13 d ago  (7/23/24) 5 mo ago  (2/9/24) 9 mo ago  (10/25/23) 1 yr ago  (7/3/23) 1 yr ago  (3/14/23) 1 yr ago  (8/16/22) 2 yr ago  (5/3/22)   Hemoglobin A1C  see below % 5.5 5.6 6.1 High  5.8 Abnormal  R, CM 5.9 Abnormal  R, CM 5.8 Abnormal  R, CM 6.4 High  R, CM   Estimated Average Glucose  Not Established mg/dL 111 114 128 120 R 123 R 120 R 137 R, CM   Resulting [facility]                    Contains abnormal data Basic metabolic panel  Order: [id number]   Status: Final result       Visible to patient: Yes (seen)       Dx: DM type 2 with diabetic peripheral ne...    2 Result Notes            Component  Ref Range & Units 2 wk ago  (7/23/24) 2 mo ago  (6/4/24) 6 mo ago  (2/9/24) 9 mo ago  (10/25/23) 1 yr ago  (7/3/23) 1 yr ago  (3/14/23) 1 yr ago  (11/21/22)   Glucose  74 - 99 mg/dL 141 High  135 High  131 High  131 High  158 High  181 High  112 High    Sodium  136 - 145 mmol/L 139 136 140 140 138 138 138   Potassium  3.5 - 5.3 mmol/L 4.0 3.9 4.1 4.2 3.9 4.0 4.2   Chloride  98 - 107 mmol/L 103 102 101 103 101 99 100   Bicarbonate  21 - 32 mmol/L 28 27 29 30 26 31 31   Anion Gap  10 - 20 mmol/L 12 11 14 11 15 12 11   Urea " Nitrogen  6 - 23 mg/dL 15 12 13 11 15 13 18   Creatinine  0.50 - 1.05 mg/dL 0.93 0.91 0.94 0.94 1.01 1.00 1.03   eGFR  >60 mL/min/1.73m*2 69 71 CM 69 CM 69 CM      Comment: Calculations of estimated GFR are performed using the 2021 CKD-EPI Study Refit equation without the race variable for the IDMS-Traceable creatinine methods.  https://jasn.asnjournals.org/content/early/2021/09/22/ASN.1579248651   Calcium  8.6 - 10.3 mg/dL 9.0             Urine Random  Order: [id number]   Status: Final result       Visible to patient: Yes (seen)       Dx: Systemic lupus erythematosus, unspeci...    0 Result Notes          Component  Ref Range & Units 2 mo ago 1 yr ago 3 yr ago 4 yr ago 5 yr ago   Total Protein, Urine Random  5 - 24 mg/dL <4 Low  <4 Low   <4 Low  7   Creatinine, Urine Random  20.0 - 320.0 mg/dL 37.9 24.4 19.1 Low  32.3 60.6   T. Protein/Creatinine Ratio  SEE COMMENT R, CM  SEE COMMENT R, CM 0.12 R   Comment: One or      ontains abnormal data Anti-DNA Antibody, Double-Stranded  Order: [id number]   Status: Final result       Visible to patient: Yes (seen)       Dx: Systemic lupus erythematosus, unspeci...    0 Result Notes           Component  Ref Range & Units 2 mo ago  (6/4/24) 9 mo ago  (10/25/23) 1 yr ago  (3/14/23) 1 yr ago  (3/14/23) 4 yr ago  (10/8/19) 5 yr ago  (6/4/19)   Anti-DNA (DS)  <5.0 IU/mL 9.0 High  17.0 High  CM 37.0 Abnormal  R, CM 37.0 Abnormal  R, CM 15.0 Abnormal  R, CM 18.0 Abnormal  R, CM   Comment: NEGATIVE: <= 4 IU/ML  EQUIVOCAL: 5- 9 IU/ML  POSITIVE:       ntial  Order: [id number]   Status: Final result       Visible to patient: Yes (seen)       Dx: Systemic lupus erythematosus, unspeci...    0 Result Notes              Component  Ref Range & Units 2 mo ago  (6/4/24) 6 mo ago  (2/9/24) 9 mo ago  (10/25/23) 1 yr ago  (3/14/23) 1 yr ago  (11/21/22) 1 yr ago  (8/16/22) 2 yr ago  (11/12/21)   WBC  4.4 - 11.3 x10*3/uL 3.8 Low  3.8 Low  2.8 Low  3.7 Low  R 3.3 Low  R 2.4 Low  R 5.0 R   nRBC  0.0 - 0.0  /100 WBCs 0.0 0.0 0.0       RBC  4.00 - 5.20 x10*6/uL 4.05 4.19 3.88 Low  4.28 R 4.04 R 3.93 Low  R 4.18 R   Hemoglobin  12.0 - 16.0 g/dL 13.0 13.3 12.2 13.2 12.4 11.6 Low  13.1   Hematocrit  36.0 - 46.0 % 37.1 39.4 36.9 39.0 38.3 36.8 40.3   MCV  80 - 100 fL 92 94 95 91 95 94 96   MCH  26.0 - 34.0 pg 32.1 31.7 31.4       MCHC  32.0 - 36.0 g/dL 35.0 33.8 33.1 33.8 32.4 31.5 Low  32.5   RDW  11.5 - 14.5 % 14.4 14.8 High  15.1 High  14.6 High  14.7 High  14.4 14.5   Platelets  150 - 450 x10*3/uL 112 Low  110 Low  91 Low  CM 93 Low  R 95 Low  R 71 Low  R 128 Low  R   Neutrophils %  40.0 - 80.0 % 64.5 65.8  70.5 54.8 66.0 72.9   Immature Granulocytes %, Automated  0.0 - 0.9 % 0.5 0.3 CM           C-Reactive Protein  Order: [id number]   Status: Final result       Visible to patient: Yes (seen)       Dx: Systemic lupus erythematosus, unspeci...    0 Result Notes         Component  Ref Range & Units 2 mo ago 9 mo ago 1 yr ago 4 yr ago   C-Reactive Protein  <1.00 mg/dL 0.19 0.54 0.19 R, CM 0.64 R, CM   Resulting Agency          LDL cholesterol, direct  Order: [id number]   Status: Final result       Visible to patient: Yes (seen)       Dx: DM type 2 with diabetic peripheral ne...    2 Result Notes       1  Topic      Component  Ref Range & Units 6 mo ago   LDL, Direct  0 - 129 mg/dL 77   Resulting Agency [facility]              Narrative       Physical Exam  Vital sign review normal normal blood pressure normal pulse ox  General Inspection reveals a pleasant obese white female in no acute distress  ENT eyes clear PERRL bilaterally no inflammation or exudate of ENT  Neck neck is all without mass adenopathy bruits rigidity thyroid is normal no JVD  Pulmonary chest clear without wheezing rales or rhonchi  Pulm cardiology  Regular sinus rhythm with very soft grade 1/2 over 6 systolic murmur otherwise no diastolic murmurs gallop or ectopy  Abdominal-no organomegaly mass rebound or mid upper abdomen no CVA tenderness  Musculoskeletal  minimal tenderness the medial SI joints and generalized weakness upon dorsal flexion of the left foot chronically mild edema but otherwise no discrete tenderness  Peripheral vascular no symmetric or asymmetric edema decree sensation touch vibration below the left ankle otherwise no new focal deficit upper lower extremities  Skin-no rashes petechiae or jaundice  Mood mood is stable on anxiety depression or cognitive issues  Her labs reviewed in detail with her gratefully is stable A1c and improving no platelet count and white count as well as reducing anti-DNA      Assessment/Plan   Problem List Items Addressed This Visit    None  Will continue her low carbohydrate low-salt low-fat diet  Continue 30 to 35 units of 75/25 insulin based on her morning sugars continue metformin  Continue Lasix low-salt diet  Prefers to remain off statin therapy with normal LDL  Continue follow-up with rheumatologist her urine protein is stable serum creatinine is stable and C-reactive protein is normal with reducing anti-DNA  Gratefully of white count platelet count is stable  Annual every 6-month eye exam  Will recheck in about 3 to 4 months and A1c at that time but otherwise no change in SBE no GI red flags  Continue supportive therapy with a cock-up splint for Charcot disease of the left foot continue follow-up with podiatry as well  @discharge  The above diagnosis and treatment plan was discussed with the patient patient will continue appropriate diet and exercise as reviewed  Patient will recheck earlier if any interval problems of significance or clinical worsening of the above problems.  Agrees above surveillance.  All question were addressed regarding above meds

## 2024-08-08 ASSESSMENT — ENCOUNTER SYMPTOMS
FLANK PAIN: 0
WEAKNESS: 1
PALPITATIONS: 0
ADENOPATHY: 0
UNEXPECTED WEIGHT CHANGE: 0
SPEECH DIFFICULTY: 1
WHEEZING: 0
BLOOD IN STOOL: 0
LIGHT-HEADEDNESS: 0
FEVER: 0
SLEEP DISTURBANCE: 0
CHEST TIGHTNESS: 0
VOMITING: 0
CONFUSION: 0
MYALGIAS: 1
ABDOMINAL PAIN: 0
COUGH: 0
SHORTNESS OF BREATH: 0
NUMBNESS: 1
FATIGUE: 0
ARTHRALGIAS: 1
HEADACHES: 0
FREQUENCY: 1
HEMATURIA: 0
DYSURIA: 0

## 2024-08-19 ENCOUNTER — APPOINTMENT (OUTPATIENT)
Dept: PRIMARY CARE | Facility: CLINIC | Age: 64
End: 2024-08-19
Payer: MEDICARE

## 2024-08-27 ENCOUNTER — HOSPITAL ENCOUNTER (OUTPATIENT)
Dept: RADIOLOGY | Facility: CLINIC | Age: 64
Discharge: HOME | End: 2024-08-27
Payer: MEDICARE

## 2024-08-27 ENCOUNTER — OFFICE VISIT (OUTPATIENT)
Dept: ORTHOPEDIC SURGERY | Facility: CLINIC | Age: 64
End: 2024-08-27
Payer: MEDICARE

## 2024-08-27 VITALS — HEIGHT: 68 IN | BODY MASS INDEX: 37.13 KG/M2 | WEIGHT: 245 LBS

## 2024-08-27 DIAGNOSIS — M67.432 GANGLION OF LEFT WRIST: ICD-10-CM

## 2024-08-27 DIAGNOSIS — M19.049 CMC ARTHRITIS: ICD-10-CM

## 2024-08-27 DIAGNOSIS — M79.645 THUMB PAIN, LEFT: ICD-10-CM

## 2024-08-27 PROCEDURE — 1036F TOBACCO NON-USER: CPT | Performed by: ORTHOPAEDIC SURGERY

## 2024-08-27 PROCEDURE — 20600 DRAIN/INJ JOINT/BURSA W/O US: CPT | Mod: LT | Performed by: ORTHOPAEDIC SURGERY

## 2024-08-27 PROCEDURE — 3044F HG A1C LEVEL LT 7.0%: CPT | Performed by: ORTHOPAEDIC SURGERY

## 2024-08-27 PROCEDURE — 73130 X-RAY EXAM OF HAND: CPT | Mod: LEFT SIDE | Performed by: ORTHOPAEDIC SURGERY

## 2024-08-27 PROCEDURE — 99214 OFFICE O/P EST MOD 30 MIN: CPT | Performed by: ORTHOPAEDIC SURGERY

## 2024-08-27 PROCEDURE — 99214 OFFICE O/P EST MOD 30 MIN: CPT | Mod: 25 | Performed by: ORTHOPAEDIC SURGERY

## 2024-08-27 PROCEDURE — 2500000005 HC RX 250 GENERAL PHARMACY W/O HCPCS: Performed by: ORTHOPAEDIC SURGERY

## 2024-08-27 PROCEDURE — 3062F POS MACROALBUMINURIA REV: CPT | Performed by: ORTHOPAEDIC SURGERY

## 2024-08-27 PROCEDURE — L3924 HFO WITHOUT JOINTS PRE OTS: HCPCS | Performed by: ORTHOPAEDIC SURGERY

## 2024-08-27 PROCEDURE — 2500000004 HC RX 250 GENERAL PHARMACY W/ HCPCS (ALT 636 FOR OP/ED): Performed by: ORTHOPAEDIC SURGERY

## 2024-08-27 PROCEDURE — 73130 X-RAY EXAM OF HAND: CPT | Mod: LT

## 2024-08-27 PROCEDURE — 3048F LDL-C <100 MG/DL: CPT | Performed by: ORTHOPAEDIC SURGERY

## 2024-08-27 PROCEDURE — 3008F BODY MASS INDEX DOCD: CPT | Performed by: ORTHOPAEDIC SURGERY

## 2024-08-27 RX ORDER — LIDOCAINE HYDROCHLORIDE 10 MG/ML
0.5 INJECTION INFILTRATION; PERINEURAL
Status: COMPLETED | OUTPATIENT
Start: 2024-08-27 | End: 2024-08-27

## 2024-08-27 NOTE — PROGRESS NOTES
8/27/2024    Chief Complaint   Patient presents with    Left Wrist - Pain, New Patient Visit, Cyst     Thumb cmc pain  Xrays today        History of Present Illness:  Patient Debra Marroquin , 64 y.o. female, presents today, 8/27/2024, for evaluation of left wrist and thumb pain and mass formation .  She states that over the last year there has been steadily enlarging mass of the volar radial aspect of the wrist.  She has history of multiple ganglion cyst on the right before.  She feels this may be related to that.  The mass itself is not painful but she is having pain she localizes to the base of the thumb and difficulties with pinch and .  She feels that this pain radiates into the wrist and she is not sure if it is related.  She states there was no injury associated with onset of symptoms.  She is right-hand dominant dividual.  She has history of lupus.       Review of Systems:   GENERAL: Negative  GI: Negative  MUSCULOSKELETAL: See HPI  SKIN: Negative  NEURO:  Negative     Physical Exam:  GENERAL:  Alert and oriented to person, place, and time.  No acute distress and breathing comfortably; pleasant and cooperative with the examination.  HEENT:  Head is normocephalic and atraumatic.  NECK:  Supple, no visible swelling.  CARDIOVASCULAR:  No palpable tachycardia.  LUNGS:  No audible wheezing or labored breathing.  ABDOMEN:  Nondistended.  Extremities: Evaluation of left upper extremity finds the patient to have a palpable radial artery at the wrist with brisk capillary refill to all digits. The patient has intact sensorium to axillary, radial, median and ulnar nerves. There are no open wounds. There are no signs of infection. There is no evidence of lymphedema or lymphatic streaking. The patient has supple compartments of the left arm, forearm and hand.  She is tender palpation over left thumb CMC articulation with positive basilar grind test.  Of the volar radial aspect of the wrist there is evidence for  boggy soft fluid collection with no surrounding erythema or warmth.  No signs of infection.  It is moderately mobile and consistent with ganglion cyst.  It is nontender to palpation.  Is about 3 cm at largest diameter.     Imaging/Test Results:  3 views of the hand include the wrist taken in the office today show no acute fracture or dislocation.  There is evidence of significant osteoarthritic change to the left thumb CMC articulation noted loss of joint space height, narrowing, osteophyte formation.  No evidence of radiopaque mass, but there is evidence of soft tissue swelling overlying the region of volar ganglion.     Assessment:  #1: Left thumb CMC arthritis.  #2: Left volar ganglion of the wrist.     Plan:  Operative and nonoperative treatment strategies were discussed.  Recommendations were made for initial nonoperative management through Kenalog injection into the CMC joint.  This was performed in office today by Dr. Harden and tolerated by patient.  In regards to the left wrist mass we did discuss possibility of further imaging with MRI, possibility of aspiration and injection, or simple observation for now.  Patient ultimately elects to observe the mass for now.  We will give them a brace to wear for comfort and for activities as needed.  Follow-up again in 6 weeks for repeat clinical exam.  No x-rays necessary upon return.  Continue with all activities as tolerated.  All questions answered at today's visit.    Hand / UE Inj/Asp: L thumb CMC for osteoarthritis on 8/27/2024 1:50 PM  Indications: pain  Details: 25 G needle, dorsal approach  Medications: 5 mg triamcinolone acetonide 10 mg/mL; 0.5 mL lidocaine 10 mg/mL (1 %)  Outcome: tolerated well, no immediate complications    Left Thumb Carpometacarpal Joint Injection: It was explained to the patient that the risks of a steroid injection include but are not limited to infection, local skin irritation, skin atrophy, calcification, continued pain and  discomfort, elevated blood sugar, burning, failure to relieve pain, and possible late infection. The patient verbalized good insight and verbalized consent for the injection. It was further explained that the postinjection discomfort can be alleviated with additional medications, ice, elevation, and rest over the first 24 hours, and that these modalities are recommended.    Using aseptic technique, a solution containing 0.5 cc of 5 mg of Kenalog and 0.5 mL of 1% lidocaine without epinephrine was injected intraarticularly to the left thumb carpometacarpal joint. Digital palpation was used to localize the CMC articulation about the dorsal radial aspect of the joint. Gentle traction was then imparted to the thumb distally and a 25-gauge needle was advanced through the skin, subcutaneous tissue and capsule. The injection was then administered and the patient tolerated the injection well. A band-aid was then placed. It should be noted that ethyl chloride spray was used to make the injection delivery more comfortable for the patient.  Procedure, treatment alternatives, risks and benefits explained, specific risks discussed. Consent was given by the patient. Immediately prior to procedure a time out was called to verify the correct patient, procedure, equipment, support staff and site/side marked as required. Patient was prepped and draped in the usual sterile fashion.         In a face to face encounter, I performed a history and physical examination, discussed pertinent diagnostic studies if indicated, and discussed diagnosis and management strategies with both the patient and the mid-level provider. I reviewed the mid-level's note and agree with the documented findings and plan of care.  Patient presents today for evaluation of the left wrist and thumb.  She localizes pain to CMC articulation.  Positive basilar grind test.  Nontender volar wrist mass consistent with loculated ganglion.  Treatment options were discussed.   We talked about operative and nonoperative strategies for ganglion cyst and thumb CMC arthritis.  Patient elects for observation of the ganglion and for steroid injection and splinting to left thumb CMC arthritis.  Follow-up in 6 weeks.  No x-rays upon return.

## 2024-08-29 DIAGNOSIS — I10 BENIGN HYPERTENSION: ICD-10-CM

## 2024-08-29 RX ORDER — FUROSEMIDE 20 MG/1
20 TABLET ORAL DAILY
Qty: 90 TABLET | Refills: 3 | Status: SHIPPED | OUTPATIENT
Start: 2024-08-29

## 2024-10-04 ENCOUNTER — APPOINTMENT (OUTPATIENT)
Dept: PRIMARY CARE | Facility: CLINIC | Age: 64
End: 2024-10-04
Payer: MEDICARE

## 2024-10-04 DIAGNOSIS — Z23 NEED FOR INFLUENZA VACCINATION: ICD-10-CM

## 2024-10-04 PROCEDURE — G0008 ADMIN INFLUENZA VIRUS VAC: HCPCS | Performed by: FAMILY MEDICINE

## 2024-10-04 PROCEDURE — 90673 RIV3 VACCINE NO PRESERV IM: CPT | Performed by: FAMILY MEDICINE

## 2024-10-04 NOTE — PROGRESS NOTES
Subjective   Patient ID: Debra Marroquin is a 64 y.o. female who presents for Immunizations (Flublok).    HPI     Review of Systems    Objective   There were no vitals taken for this visit.    Physical Exam    Assessment/Plan        Flu vaccine given left deltoid. Patient tolerated well. VIS given to patient.

## 2024-10-07 ENCOUNTER — OFFICE VISIT (OUTPATIENT)
Dept: ORTHOPEDIC SURGERY | Facility: CLINIC | Age: 64
End: 2024-10-07
Payer: MEDICARE

## 2024-10-07 DIAGNOSIS — M19.049 CMC ARTHRITIS: Primary | ICD-10-CM

## 2024-10-07 PROCEDURE — 99214 OFFICE O/P EST MOD 30 MIN: CPT | Performed by: ORTHOPAEDIC SURGERY

## 2024-10-07 PROCEDURE — 3044F HG A1C LEVEL LT 7.0%: CPT | Performed by: ORTHOPAEDIC SURGERY

## 2024-10-07 PROCEDURE — 1036F TOBACCO NON-USER: CPT | Performed by: ORTHOPAEDIC SURGERY

## 2024-10-07 PROCEDURE — 3062F POS MACROALBUMINURIA REV: CPT | Performed by: ORTHOPAEDIC SURGERY

## 2024-10-07 PROCEDURE — 3048F LDL-C <100 MG/DL: CPT | Performed by: ORTHOPAEDIC SURGERY

## 2024-10-07 PROCEDURE — 99213 OFFICE O/P EST LOW 20 MIN: CPT | Performed by: ORTHOPAEDIC SURGERY

## 2024-10-07 NOTE — PROGRESS NOTES
History present illness: Patient presents today for evaluation of painful left thumb CMC arthritis and left wrist volar ganglion.  She status post steroid injection to left thumb CMC joint.  The patient reports that while not entirely resolved her pain is substantially improved and she continues to have relief.  She describes a mild blunt force injury to the left volar wrist and a sensation that the cyst ruptured.  Is gotten smaller.  The cyst is not painful in any way.      Past medical history: The patient's past medical history, family history, social history, and review of systems were documented on the patient medical intake.  The updated data was reviewed in the electronic medical record.  History is negative except otherwise stated in history of present illness.        Physical examination:  General: Alert and oriented to person, place, and time.  No acute distress and breathing comfortably: Pleasant and cooperative with examination.  HEENT: Head is normocephalic and atraumatic.  Neck: Supple, no visible swelling.  Cardiovascular: No palpable tachycardia  Lungs: No audible wheezing or labored breathing  Abdomen: Nondistended.  Extremities: Evaluation of the left upper extremity finds the patient had palpable radial artery at the wrist with brisk capillary refill to all digits.  Patient has intact sensation to axillary radial median and ulnar nerves.  There are no open wounds.  There are no signs of infection.  There is no evidence of lymphedema or lymphatic streaking.  The patient has supple compartments to left arm forearm and hand.      Radiology:      Assessment: Left thumb CMC arthritis responding well to nonoperative measures.  Left wrist volar ganglion, asymptomatic.      Plan: Recommendations were made for continuance of intermittent splinting for the left thumb and for activities to tolerance.  Follow-up with me in 6 weeks to discuss whether or not she needs repeat steroid injection.  No x-rays upon  return.  Recommend for simple observation of left wrist volar ganglion.  Patient is agreeable.        Procedure:

## 2024-11-18 ENCOUNTER — APPOINTMENT (OUTPATIENT)
Dept: ORTHOPEDIC SURGERY | Facility: CLINIC | Age: 64
End: 2024-11-18
Payer: MEDICARE

## 2024-12-02 ENCOUNTER — APPOINTMENT (OUTPATIENT)
Dept: PRIMARY CARE | Facility: CLINIC | Age: 64
End: 2024-12-02
Payer: MEDICARE

## 2024-12-02 VITALS
TEMPERATURE: 95.9 F | SYSTOLIC BLOOD PRESSURE: 122 MMHG | HEART RATE: 71 BPM | HEIGHT: 68 IN | WEIGHT: 250 LBS | OXYGEN SATURATION: 97 % | RESPIRATION RATE: 16 BRPM | DIASTOLIC BLOOD PRESSURE: 74 MMHG | BODY MASS INDEX: 37.89 KG/M2

## 2024-12-02 DIAGNOSIS — M14.679 CHARCOT'S JOINT, UNSPECIFIED ANKLE AND FOOT: ICD-10-CM

## 2024-12-02 DIAGNOSIS — E11.42 DM TYPE 2 WITH DIABETIC PERIPHERAL NEUROPATHY: ICD-10-CM

## 2024-12-02 DIAGNOSIS — D72.819 SYSTEMIC LUPUS ERYTHEMATOSUS (SLE) WITH LEUKOPENIA (MULTI): ICD-10-CM

## 2024-12-02 DIAGNOSIS — M32.19 SYSTEMIC LUPUS ERYTHEMATOSUS (SLE) WITH LEUKOPENIA (MULTI): ICD-10-CM

## 2024-12-02 DIAGNOSIS — E66.9 OBESITY (BMI 35.0-39.9 WITHOUT COMORBIDITY): ICD-10-CM

## 2024-12-02 DIAGNOSIS — I10 BENIGN HYPERTENSION: Primary | ICD-10-CM

## 2024-12-02 PROCEDURE — 3078F DIAST BP <80 MM HG: CPT | Performed by: FAMILY MEDICINE

## 2024-12-02 PROCEDURE — 3008F BODY MASS INDEX DOCD: CPT | Performed by: FAMILY MEDICINE

## 2024-12-02 PROCEDURE — 3048F LDL-C <100 MG/DL: CPT | Performed by: FAMILY MEDICINE

## 2024-12-02 PROCEDURE — 99213 OFFICE O/P EST LOW 20 MIN: CPT | Performed by: FAMILY MEDICINE

## 2024-12-02 PROCEDURE — 1036F TOBACCO NON-USER: CPT | Performed by: FAMILY MEDICINE

## 2024-12-02 PROCEDURE — 3044F HG A1C LEVEL LT 7.0%: CPT | Performed by: FAMILY MEDICINE

## 2024-12-02 PROCEDURE — 3062F POS MACROALBUMINURIA REV: CPT | Performed by: FAMILY MEDICINE

## 2024-12-02 PROCEDURE — 3074F SYST BP LT 130 MM HG: CPT | Performed by: FAMILY MEDICINE

## 2024-12-02 RX ORDER — INSULIN LISPRO 100 [IU]/ML
INJECTION, SUSPENSION SUBCUTANEOUS
Qty: 15 ML | Refills: 3 | Status: SHIPPED | OUTPATIENT
Start: 2024-12-02

## 2024-12-02 RX ORDER — CYCLOBENZAPRINE HCL 10 MG
10 TABLET ORAL EVERY 12 HOURS PRN
COMMUNITY
Start: 2024-11-06 | End: 2024-12-06

## 2024-12-02 RX ORDER — METFORMIN HYDROCHLORIDE 1000 MG/1
1000 TABLET ORAL 2 TIMES DAILY
Qty: 180 TABLET | Refills: 3 | Status: SHIPPED | OUTPATIENT
Start: 2024-12-02 | End: 2025-11-27

## 2024-12-02 NOTE — PROGRESS NOTES
Subjective   Patient ID: Debra Marroquin is a 64 y.o. female who presents for Diabetes Mellitus (3 month follow up ).  HPI  Pleasant 64-year-old patient with history of hypertension Charcodote joint of the left ankle type 2 diabetes here to recheck overalls been taking 7525 50 units in the morning and about 25-30 in the evening after review of her home Accu-Cheks her highest sugars are actually in the late afternoon and we will switch her insulin around i.e. 15 to 20 units before supper but 25 to 30 units before breakfast.  Remains on Lasix 20 mg daily Plaquenil twice a day for lupus syndrome is followed by rheumatology.  Gratefully her white count is been stable  Denies any recent chest pain shortness or palpitations.  Does use cock-up splint on her left ankle but otherwise no new edema of the lower extremities tries watch a low-salt low carbohydrate and low-fat diet  Multi vitamin D vitamin C vitamin D and multivitamins with iron takes metformin also 1000 mg twice a day as well as albuterol only if needed also takes over-the-counter B12 daily  Review of Systems   Constitutional:  Negative for fatigue, fever and unexpected weight change.   Eyes:  Negative for visual disturbance.   Respiratory:  Negative for cough, chest tightness and shortness of breath.    Cardiovascular:  Negative for chest pain, palpitations and leg swelling.   Gastrointestinal:  Negative for abdominal pain, blood in stool and vomiting.   Genitourinary:  Positive for frequency. Negative for dysuria, flank pain, hematuria and urgency.   Musculoskeletal:  Positive for arthralgias, gait problem and myalgias.   Skin:  Negative for rash.   Neurological:  Positive for weakness and numbness. Negative for syncope, light-headedness and headaches.   Psychiatric/Behavioral:  Negative for behavioral problems, confusion, sleep disturbance and suicidal ideas.        Objective   /74 (BP Location: Left arm, Patient Position: Sitting, BP Cuff Size: Large  "adult)   Pulse 71   Temp 35.5 °C (95.9 °F) (Temporal)   Resp 16   Ht 1.727 m (5' 8\")   Wt 113 kg (250 lb)   SpO2 97%   BMI 38.01 kg/m²    Contains abnormal data Basic metabolic panel  Order: [id number]   Status: Final result       Visible to patient: Yes (seen)       Dx: DM type 2 with diabetic peripheral ne...    2 Result Notes            Component  Ref Range & Units 4 mo ago  (7/23/24) 6 mo ago  (6/4/24) 9 mo ago  (2/9/24) 1 yr ago  (10/25/23) 1 yr ago  (7/3/23) 1 yr ago  (3/14/23) 2 yr ago  (11/21/22)   Glucose  74 - 99 mg/dL 141 High  135 High  131 High  131 High  158 High  181 High  112 High    Sodium  136 - 145 mmol/L 139 136 140 140 138 138 138   Potassium  3.5 - 5.3 mmol/L 4.0 3.9 4.1 4.2 3.9 4.0 4.2   Chloride  98 - 107 mmol/L 103 102 101 103 101 99 100   Bicarbonate  21 - 32 mmol/L 28 27 29 30 26 31 31   Anion Gap  10 - 20 mmol/L 12 11 14 11 15 12 11   Urea Nitrogen  6 - 23 mg/dL 15 12 13 11 15 13 18   Creatinine  0.50 - 1.05 mg/dL 0.93 0.91 0.94 0.94 1.01 1.00 1.03   eGFR  >60 mL/min/1.73m*2 69 71 CM 69 CM 69 CM      Comment: Calculations of estimated GFR are performed using the 2021 CKD-EPI Study Refit equation without the race variable for the IDMS-Traceable creatinine methods.  https://jasn.asnjournals.org/content/early/2021/09/22/ASN.4773695115   Calcium  8.6 - 10.3 mg/dL 9.0 9.4 9.1 9.2 9.5 9.7 9.5   Resulting Agency [facility]           moglobin A1c  Order: [id number]   Status: Final result       Visible to patient: Yes (seen)       Dx: DM type 2 with diabetic peripheral ne...    2 Result Notes       1  Topic            Component  Ref Range & Units 4 mo ago  (7/23/24) 9 mo ago  (2/9/24) 1 yr ago  (10/25/23) 1 yr ago  (7/3/23) 1 yr ago  (3/14/23) 2 yr ago  (8/16/22) 2 yr ago  (5/3/22)   Hemoglobin A1C  see below % 5.5 5.6 6.1 High  5.8 Abnormal  R, CM 5.9 Abnormal  R, CM 5.8 Abnormal  R, CM 6.4 High  R, CM   Estimated Average Glucose  Not " Established mg/dL 111 114 128 120 R 123 R 120 R 137 R, CM   Resulting Agency [facility]              Narrative    LDL cholesterol, direct  Order: [id number]   Status: Final result       Visible to patient: Yes (seen)       Dx: DM type 2 with diabetic peripheral ne...    2 Result Notes       1  Topic      Component  Ref Range & Units 9 mo ago   LDL, Direct  0 - 129 mg/dL 77   Resulting Agency [facility]              Narrative  Performed by: [facility]  Elevated levels of LDL cholesterol are recognized as a key factor in the development of atherosclerosis and CHD. The direct LDL cholesterol test can be used to assess cardiovascular risk and monitor therapy as a follow up to  a lipid profile when t   C-Reactive Protein  Order: [id number]   Status: Final result       Visible to patient: Yes (seen)       Dx: Systemic lupus erythematosus, unspeci...    0 Result Notes         Component  Ref Range & Units 6 mo ago  (6/4/24) 1 yr ago  (10/25/23) 1 yr ago  (3/14/23) 4 yr ago  (1/24/20)   C-Reactive Protein  <1.00 mg/dL 0.19 0.54 0.19 R, CM 0.64 R, CM   [place] [facility]                 Physical Exam  Vitals reviewed and normal pulse ox good for 97  Neck neck is without mass adenopathy rigidity no stridor no JVD thyroid is normal  Cardiovascular regular sinus rhythm without significant murmurs gallop or ectopy  Pulmonary bronchial breathing noted otherwise no wheezing rales or rhonchi  GI no organomegaly mass or rebound in mid upper abdomen.  No CVA tenderness  Peripheral vascular slightly reduced pulses but otherwise reduced dorsiflexion of the left foot with ongoing splinting from Charcot joint.  Edema of the left ankle unchanged but no pretibial edema-decreased sensation to touch and vibration of the left foot more than right foot  Skin no new rash petechiae or jaundice  Mood mood is stable anxiety depressive or cognitive issue      Assessment/Plan   Problem List Items  Addressed This Visit       DM type 2 with diabetic peripheral neuropathy   Earlier A1c roughly 3 to 4 months ago at 5.5 sugar about 1 4146 with higher Accu-Cheks at home in the evening hence we will switch to a higher dose of 75/25 Humalog in the morning and lesser amount in the evening.  Control blood pressure no change in Charcot joint  No change in arthralgia and continue follow-up with rheumatology with her attendant Plaquenil.  No change in visual acuity liver functions been stable her LDL is 75 with good control and she declines any statin therapy after multiple reviews  Will continue low-salt low carbohydrate low-fat diet continue counter vitamin as well as Lasix  Recheck in 2 to 3 months at which time we will get updated BMP A1c.  Above diet medicines and labs reviewed in detail with her  No change in Sbe; deClines mammography at this time no GI red flags  @discharge  The above diagnosis and treatment plan was discussed with the patient patient will continue appropriate diet and exercise as reviewed  Patient will recheck earlier if any interval problems of significance or clinical worsening of the above problems.  Agrees above surveillance.  All question were addressed regarding above meds

## 2024-12-05 ASSESSMENT — ENCOUNTER SYMPTOMS
SLEEP DISTURBANCE: 0
FREQUENCY: 1
HEADACHES: 0
PALPITATIONS: 0
CHEST TIGHTNESS: 0
ABDOMINAL PAIN: 0
ARTHRALGIAS: 1
NUMBNESS: 1
FLANK PAIN: 0
COUGH: 0
VOMITING: 0
CONFUSION: 0
WEAKNESS: 1
SHORTNESS OF BREATH: 0
FATIGUE: 0
MYALGIAS: 1
UNEXPECTED WEIGHT CHANGE: 0
DYSURIA: 0
FEVER: 0
LIGHT-HEADEDNESS: 0
BLOOD IN STOOL: 0
HEMATURIA: 0

## 2025-03-03 ENCOUNTER — APPOINTMENT (OUTPATIENT)
Dept: PRIMARY CARE | Facility: CLINIC | Age: 65
End: 2025-03-03
Payer: MEDICARE

## 2025-03-03 VITALS
DIASTOLIC BLOOD PRESSURE: 78 MMHG | WEIGHT: 247 LBS | BODY MASS INDEX: 37.44 KG/M2 | HEIGHT: 68 IN | TEMPERATURE: 97.2 F | RESPIRATION RATE: 16 BRPM | SYSTOLIC BLOOD PRESSURE: 126 MMHG | OXYGEN SATURATION: 97 % | HEART RATE: 68 BPM

## 2025-03-03 DIAGNOSIS — M32.19 SYSTEMIC LUPUS ERYTHEMATOSUS (SLE) WITH LEUKOPENIA (MULTI): ICD-10-CM

## 2025-03-03 DIAGNOSIS — E66.01 OBESITY, MORBID (MULTI): ICD-10-CM

## 2025-03-03 DIAGNOSIS — L97.512 ULCER OF TOE OF RIGHT FOOT, WITH FAT LAYER EXPOSED (MULTI): ICD-10-CM

## 2025-03-03 DIAGNOSIS — E11.42 DM TYPE 2 WITH DIABETIC PERIPHERAL NEUROPATHY: Primary | ICD-10-CM

## 2025-03-03 DIAGNOSIS — Z53.20 STATIN DECLINED: ICD-10-CM

## 2025-03-03 DIAGNOSIS — M14.679 CHARCOT'S JOINT, UNSPECIFIED ANKLE AND FOOT: ICD-10-CM

## 2025-03-03 DIAGNOSIS — I10 BENIGN HYPERTENSION: ICD-10-CM

## 2025-03-03 DIAGNOSIS — D72.819 SYSTEMIC LUPUS ERYTHEMATOSUS (SLE) WITH LEUKOPENIA (MULTI): ICD-10-CM

## 2025-03-03 PROCEDURE — 3008F BODY MASS INDEX DOCD: CPT | Performed by: FAMILY MEDICINE

## 2025-03-03 PROCEDURE — 99213 OFFICE O/P EST LOW 20 MIN: CPT | Performed by: FAMILY MEDICINE

## 2025-03-03 PROCEDURE — 3074F SYST BP LT 130 MM HG: CPT | Performed by: FAMILY MEDICINE

## 2025-03-03 PROCEDURE — 1036F TOBACCO NON-USER: CPT | Performed by: FAMILY MEDICINE

## 2025-03-03 PROCEDURE — 3078F DIAST BP <80 MM HG: CPT | Performed by: FAMILY MEDICINE

## 2025-03-03 ASSESSMENT — PATIENT HEALTH QUESTIONNAIRE - PHQ9
2. FEELING DOWN, DEPRESSED OR HOPELESS: NOT AT ALL
SUM OF ALL RESPONSES TO PHQ9 QUESTIONS 1 AND 2: 0
1. LITTLE INTEREST OR PLEASURE IN DOING THINGS: NOT AT ALL

## 2025-03-03 NOTE — PROGRESS NOTES
Subjective   Patient ID: Debra Marroquin is a 64 y.o. female who presents for Diabetes Mellitus (3 month follow up ).  HPI  Pleasant 64-year-old female with a history of Charcot joint of the feet systemic lupus type 2 diabetes mild hypertension is here to recheck overall we did check her A1c recently 5.7 sugars range from  as she is now down to about 25 units of 70/30 insulin twice a day metformin twice a day with good kidney functions  Follow regularly by rheumatology and her white count is improved  Denies any chest pain short of breath or palpitations otherwise hand arthrotomy fairly stable complement C3-C4 has been normal as well as recent C-reactive protein and actually improving anti-DNA antibody per rheumatology  Chronic meds reviewed no reported side effects  Does take 20 mg of Lasix for mild hypertension peripheral edema which is done well    Statin therapy and her LDLs remain less than 100  Iron multivitamins B12 as well as supplemental iron takes Plaquenil 200 mg twice a day at the direction of her rheumatologist.  Rarely uses her albuterol perhaps once a month.  Review of Systems   Constitutional:  Negative for fatigue, fever and unexpected weight change.   HENT:  Positive for rhinorrhea. Negative for sinus pressure, sinus pain and sore throat.    Eyes:  Negative for visual disturbance.   Respiratory:  Negative for cough, chest tightness, shortness of breath and wheezing.    Cardiovascular:  Negative for chest pain, palpitations and leg swelling.   Gastrointestinal:  Negative for abdominal pain, blood in stool and vomiting.   Genitourinary:  Positive for frequency. Negative for dysuria, flank pain and hematuria.   Musculoskeletal:  Positive for arthralgias, back pain and gait problem. Negative for myalgias.   Skin:  Negative for rash.   Neurological:  Positive for numbness. Negative for tremors, weakness, light-headedness and headaches.   Psychiatric/Behavioral:  Negative for behavioral problems,  "confusion, decreased concentration, sleep disturbance and suicidal ideas.        Objective   /78 (BP Location: Left arm, Patient Position: Sitting, BP Cuff Size: Large adult)   Pulse 68   Temp 36.2 °C (97.2 °F) (Temporal)   Resp 16   Ht 1.727 m (5' 8\")   Wt 112 kg (247 lb)   SpO2 97%   BMI 37.56 kg/m²   olic panel  Order: [id number]   Status: Final result       Visible to patient: Yes (seen)       Dx: DM type 2 with diabetic peripheral ne...    2 Result Notes            Component  Ref Range & Units 7 mo ago  (7/23/24) 9 mo ago  (6/4/24) 1 yr ago  (2/9/24) 1 yr ago  (10/25/23) 1 yr ago  (7/3/23) 1 yr ago  (3/14/23) 2 yr ago  (11/21/22)   Glucose  74 - 99 mg/dL 141 High  135 High  131 High  131 High  158 High  181 High  112 High    Sodium  136 - 145 mmol/L 139 136 140 140 138 138 138   Potassium  3.5 - 5.3 mmol/L 4.0 3.9 4.1 4.2 3.9 4.0 4.2   Chloride  98 - 107 mmol/L 103 102 101 103 101 99 100   Bicarbonate  21 - 32 mmol/L 28 27 29 30 26 31 31   Anion Gap  10 - 20 mmol/L 12 11 14 11 15 12 11   Urea Nitrogen  6 - 23 mg/dL 15 12 13 11 15 13 18   Creatinine  0.50 - 1.05 mg/dL 0.93 0.91 0.94 0.94 1.01 1.00 1.03   eGFR  >60 mL/min/1.73m*2 69 71 CM 69 CM 69 CM      Comment: Calculations of estimated GFR are performed using the 2021 CKD-EPI Study Refit equation without the race variable for the IDMS-Traceable creatinine methods.  https://jasn.asnjournals.org/content/early/2021/09/22/ASN.2548476464   Calcium  8.6 - 10.3 mg/dL 9.0 9.4 9.1 9.2 9.5 9.7 9.5   Resulting Agency [facility]            ins abnormal data Protein, Urine Random  Order: [id number]   Status: Final result       Visible to patient: Yes (seen)       Dx: Systemic lupus erythematosus, unspeci...    0 Result Notes          Component  Ref Range & Units 9 mo ago  (6/4/24) 1 yr ago  (3/14/23) 3 yr ago  (7/15/21) 5 yr ago  (10/8/19) 5 yr ago  (6/4/19)   Total Protein, Urine Random  5 - 24 mg/dL <4 Low  <4 Low   <4 Low  7   Creatinine, Urine Random  20.0 - 320.0 " mg/dL 37.9 24.4 19.1 Low  32.3 60.6   T. Protein/Creatinine Ratio           C4 Complement  Order: [id number]   Status: Final result       Visible to patient: Yes (seen)       Dx: Systemic lupus erythematosus, unspeci...    0 Result Notes       Component  Ref Range & Units 9 mo ago 1 yr ago   C4 Complement  10 - 50 mg/dL 14 11   Resulting Agency [facility]             tains abnormal data Anti-DNA Antibody, Double-Stranded  Order: [id number]   Status: Final result       Visible to patient: Yes (seen)       Dx: Systemic lupus erythematosus, unspeci...    0 Result Notes           Component  Ref Range & Units 9 mo ago  (6/4/24) 1 yr ago  (10/25/23) 1 yr ago  (3/14/23) 1 yr ago  (3/14/23) 5 yr ago  (10/8/19) 5 yr ago  (6/4/19)   Anti-DNA (DS)  <5.0 IU/mL 9.0 High  17.0 High  CM 37.0 Abnormal  R, CM 37.0 Abnormal  R, CM 15.0 Abnormal  R, CM 18.0 Abnormal  R, CM   Comment: NEGATIVE: <= 4 IU/ML  EQUIVOCAL: 5- 9 IU/ML      eactive Protein  Order: [id number]   Status: Final result       Visible to patient: Yes (seen)       Dx: Systemic lupus erythematosus, unspeci...    0 Result Notes         Component  Ref Range & Units 9 mo ago  (6/4/24) 1 yr ago  (10/25/23) 1 yr ago  (3/14/23) 5 yr ago  (1/24/20)   C-Reactive Protein  <1.00 mg/dL 0.19 0.54 0.19 R, CM 0.64 R, CM   Resulting Agency [facility] [place]                 Physical Exam  Sign reviewed and normal blood pressure good and pulse ox good  General Inspection reveals a pleasant or active individual in no acute distress  Neck neck is without mass adenopathy bruits rigidity  Cardiovascular soft grade 1/2 of a 6 systolic extra murmur otherwise no diastolic murmurs gallop or ectopy  Pulmonary chest is clear  Peripheral vascular no tenderness of the foot but otherwise reduced sensation to vibration and touch no pretibial edema today.  Pulses are intact  Mood mood is stable anxiety depressive or cognitive issues  Skin no rashes petechiae or jaundice      Assessment/Plan   Problem List Items  Addressed This Visit    None  Contains abnormal data Hemoglobin A1c  Order: [id number]   Status: Final result       Visible to patient: Yes (seen)       Dx: DM type 2 with diabetic peripheral ne...    0 Result Notes       1  Topic      Component  Ref Range & Units 1 d ago   HEMOGLOBIN A1c  <5.7 % of total Hgb 5.7 High    Comment: For someone without known diabetes,       cholesterol, direct  Order: [id number]   Status: Final result       Visible to patient: Yes (seen)       Dx: DM type 2 with diabetic peripheral ne...    0 Result Notes       1 HM Topic      Component  Ref Range & Units 1 d ago   DIRECT LDL  <100 mg/dL 78   Comment: Greatly elevated Triglycerides values (>1200 mg/dL)  interfere with the dLDL assay.         Adjust her morning insulin to either 10 to 15 units depending on her morning sugar otherwise remains on 25 units before supper otherwise continue low-salt low carbohydrate low-fat diet good LDL today and good hemoglobin A1c continue follow-up with podiatry as she uses her cock up ankle foot splint because of Charcot joint otherwise at least her hemoglobin and platelet count and white count is improved and stable on her chronic Plaquenil she will continue her B12 multiple B vitamins iron and otherwise we will recheck in about 3 months to home sugars.  Follow-up with rheumatology in regards to her SLE questions were addressed declined statin therapy but otherwise will remain on her metformin insulin twice a day as well as Lasix in the morning  @discharge  The above diagnosis and treatment plan was discussed with the patient patient will continue appropriate diet and exercise as reviewed  Patient will recheck earlier if any interval problems of significance or clinical worsening of the above problems.  Agrees above surveillance.  All question were addressed regarding above meds

## 2025-03-04 ENCOUNTER — APPOINTMENT (OUTPATIENT)
Dept: RHEUMATOLOGY | Facility: CLINIC | Age: 65
End: 2025-03-04
Payer: MEDICARE

## 2025-03-06 PROBLEM — Z53.20 STATIN DECLINED: Status: ACTIVE | Noted: 2025-03-06

## 2025-03-06 PROBLEM — L97.512 ULCER OF TOE OF RIGHT FOOT, WITH FAT LAYER EXPOSED (MULTI): Status: ACTIVE | Noted: 2025-03-06

## 2025-03-06 LAB
EST. AVERAGE GLUCOSE BLD GHB EST-MCNC: 117 MG/DL
EST. AVERAGE GLUCOSE BLD GHB EST-SCNC: 6.5 MMOL/L
HBA1C MFR BLD: 5.7 % OF TOTAL HGB
LDLC SERPL DIRECT ASSAY-MCNC: 78 MG/DL

## 2025-03-06 ASSESSMENT — ENCOUNTER SYMPTOMS
BACK PAIN: 1
ABDOMINAL PAIN: 0
HEADACHES: 0
SINUS PRESSURE: 0
NUMBNESS: 1
FEVER: 0
DECREASED CONCENTRATION: 0
CHEST TIGHTNESS: 0
DYSURIA: 0
FLANK PAIN: 0
HEMATURIA: 0
BLOOD IN STOOL: 0
TREMORS: 0
FATIGUE: 0
SINUS PAIN: 0
SORE THROAT: 0
VOMITING: 0
WEAKNESS: 0
SHORTNESS OF BREATH: 0
LIGHT-HEADEDNESS: 0
UNEXPECTED WEIGHT CHANGE: 0
ARTHRALGIAS: 1
COUGH: 0
RHINORRHEA: 1
MYALGIAS: 0
FREQUENCY: 1
CONFUSION: 0
WHEEZING: 0
SLEEP DISTURBANCE: 0
PALPITATIONS: 0

## 2025-03-07 NOTE — ASSESSMENT & PLAN NOTE
Parents of ulcerative involvement of either foot is followed by podiatry regularly for Charcot process but otherwise no ischemic ulcers or break of the skin recently

## 2025-03-11 ENCOUNTER — APPOINTMENT (OUTPATIENT)
Dept: RHEUMATOLOGY | Facility: CLINIC | Age: 65
End: 2025-03-11
Payer: MEDICARE

## 2025-03-11 VITALS
DIASTOLIC BLOOD PRESSURE: 67 MMHG | TEMPERATURE: 97 F | HEART RATE: 78 BPM | WEIGHT: 249 LBS | HEIGHT: 68 IN | BODY MASS INDEX: 37.74 KG/M2 | SYSTOLIC BLOOD PRESSURE: 126 MMHG

## 2025-03-11 DIAGNOSIS — M32.9 SYSTEMIC LUPUS ERYTHEMATOSUS, UNSPECIFIED SLE TYPE, UNSPECIFIED ORGAN INVOLVEMENT STATUS (MULTI): ICD-10-CM

## 2025-03-11 DIAGNOSIS — M32.9 SYSTEMIC LUPUS ERYTHEMATOSUS, UNSPECIFIED SLE TYPE, UNSPECIFIED ORGAN INVOLVEMENT STATUS (MULTI): Primary | ICD-10-CM

## 2025-03-11 PROCEDURE — 3008F BODY MASS INDEX DOCD: CPT | Performed by: INTERNAL MEDICINE

## 2025-03-11 PROCEDURE — 99213 OFFICE O/P EST LOW 20 MIN: CPT | Performed by: INTERNAL MEDICINE

## 2025-03-11 PROCEDURE — 3078F DIAST BP <80 MM HG: CPT | Performed by: INTERNAL MEDICINE

## 2025-03-11 PROCEDURE — 3074F SYST BP LT 130 MM HG: CPT | Performed by: INTERNAL MEDICINE

## 2025-03-11 RX ORDER — HYDROXYCHLOROQUINE SULFATE 200 MG/1
200 TABLET, FILM COATED ORAL
Qty: 180 TABLET | Refills: 3 | Status: SHIPPED | OUTPATIENT
Start: 2025-03-11 | End: 2026-03-06

## 2025-03-11 ASSESSMENT — PAIN SCALES - GENERAL: PAINLEVEL_OUTOF10: 2

## 2025-03-11 NOTE — PROGRESS NOTES
Subjective   Patient ID: Debra Marroquin is a 64 y.o. female who presents for Follow-up (9 month follow up. C/O Both hands stiff.).    HPI  64 yr old WF with H/O SLE. She has leukopenia/lymphopenia, anti-DNA antibody positive and low C4. She has been on Plaquenil for several years.  She reports bilateral shoulder pain with L> R. Hurts to lay on them at night. No morning stiffness. Knees get stiff with prolonged sitting. She has OA in the knees.   She is able to make a fist.   No mouth sores, rash or photosensitivity, chest pain, Raynaud's or dry mouth.   Has dry eyes.      DEXA: 05/23:  Total femur T score: 1.8  Femur neck T score: 0.6  Spine L1-4 T score: 2.3  AN 1/160  dsDNA 37, C3, C4 NL, TRESA neg    Interval history:  She reports intermittent hand joint pain, stiffness, but denies joint swelling.  She has also intermittent neck and shoulder pain at right site  She reports dry eyes and dry skin, dry mouth  Her recent eye exam for HCQ was in 09/23 03/25:  She reports hand joint pain and stiffness, but her pain is stable    Current meds:   mg daily     ROS  Joint pain in hands: negative   Joint swelling: negative  Morning stiffness and duration: negative   strength: normal  Oral ulcer: negative  Genital ulcer: negative  Raynaud phenomenon: negative  Chest pain/dyspnea: negative  Low back pain: negative  Visual problem: negative  Dry eyes/dry mouth: negative  Skin rash/scaling/psoriasis: negative       Objective     PEXAM  VS reviewed, WNL  General: Alert, no distress   HEENT: Normocephalic/atraumatic, No alopecia. PERRLA. Sclera white, conjunctiva pink, no malar rash. no oral or nasal ulcer. Oral cavity pink and moist, no erythema or exudate, dentition good.   Neck: supple  Respiratory: CTA B, no adventitious breath sounds  Cardiac: RRR, no murmurs, carotid, or bruits  Abdominal: symmetrical, soft, non-tender, non-distended, normoactive BSx4 quadrants, no CVA tenderness or suprapubic tenderness  MSK:  Joints of upper and lower extremities were assessed for synovitis and ROM.    Today she has no evidence of synovitis in the joints of her hands or wrists, tender joint count 0, swollen joint count 0   Extremities: no clubbing, no cyanosis, no edema  Skin: Skin warm and moist.   Neuro: non-focal, Strength 5/5 throughout. Normal gait. No cerebellar pathologic exam     Assessment/Plan   64 yr old with SLE which is in remission.   Continue Plaquenil. 400  Recent eyexam in 09/23, NL     Right shoulder subacromial bursitis: Her pain is intermittent and not severe now      Knee OA: Continue Tylenol arthritis.      DXA came back NL     -will check her lupus markers  -will see her in 8-9 months

## 2025-03-14 LAB
ALBUMIN SERPL-MCNC: 4.1 G/DL (ref 3.6–5.1)
ALP SERPL-CCNC: 75 U/L (ref 37–153)
ALT SERPL-CCNC: 19 U/L (ref 6–29)
ANION GAP SERPL CALCULATED.4IONS-SCNC: 11 MMOL/L (CALC) (ref 7–17)
AST SERPL-CCNC: 26 U/L (ref 10–35)
BASOPHILS # BLD AUTO: 30 CELLS/UL (ref 0–200)
BASOPHILS NFR BLD AUTO: 0.9 %
BILIRUB SERPL-MCNC: 0.7 MG/DL (ref 0.2–1.2)
BUN SERPL-MCNC: 15 MG/DL (ref 7–25)
C3 SERPL-MCNC: 121 MG/DL (ref 83–193)
C4 SERPL-MCNC: 11 MG/DL (ref 15–57)
CALCIUM SERPL-MCNC: 9.3 MG/DL (ref 8.6–10.4)
CHLORIDE SERPL-SCNC: 100 MMOL/L (ref 98–110)
CO2 SERPL-SCNC: 27 MMOL/L (ref 20–32)
CREAT SERPL-MCNC: 0.91 MG/DL (ref 0.5–1.05)
CRP SERPL-MCNC: <3 MG/L
DSDNA AB SER-ACNC: 5 IU/ML
EGFRCR SERPLBLD CKD-EPI 2021: 71 ML/MIN/1.73M2
EOSINOPHIL # BLD AUTO: 59 CELLS/UL (ref 15–500)
EOSINOPHIL NFR BLD AUTO: 1.8 %
ERYTHROCYTE [DISTWIDTH] IN BLOOD BY AUTOMATED COUNT: 14.2 % (ref 11–15)
ERYTHROCYTE [SEDIMENTATION RATE] IN BLOOD BY WESTERGREN METHOD: 22 MM/H
GLUCOSE SERPL-MCNC: 151 MG/DL (ref 65–99)
HCT VFR BLD AUTO: 36.3 % (ref 35–45)
HGB BLD-MCNC: 12.5 G/DL (ref 11.7–15.5)
LYMPHOCYTES # BLD AUTO: 762 CELLS/UL (ref 850–3900)
LYMPHOCYTES NFR BLD AUTO: 23.1 %
MCH RBC QN AUTO: 31.6 PG (ref 27–33)
MCHC RBC AUTO-ENTMCNC: 34.4 G/DL (ref 32–36)
MCV RBC AUTO: 91.9 FL (ref 80–100)
MONOCYTES # BLD AUTO: 241 CELLS/UL (ref 200–950)
MONOCYTES NFR BLD AUTO: 7.3 %
NEUTROPHILS # BLD AUTO: 2208 CELLS/UL (ref 1500–7800)
NEUTROPHILS NFR BLD AUTO: 66.9 %
PLATELET # BLD AUTO: 94 THOUSAND/UL (ref 140–400)
PMV BLD REES-ECKER: 10.8 FL (ref 7.5–12.5)
POTASSIUM SERPL-SCNC: 4.1 MMOL/L (ref 3.5–5.3)
PROT SERPL-MCNC: 6.7 G/DL (ref 6.1–8.1)
RBC # BLD AUTO: 3.95 MILLION/UL (ref 3.8–5.1)
SODIUM SERPL-SCNC: 138 MMOL/L (ref 135–146)
WBC # BLD AUTO: 3.3 THOUSAND/UL (ref 3.8–10.8)

## 2025-04-04 ENCOUNTER — APPOINTMENT (OUTPATIENT)
Dept: PRIMARY CARE | Facility: CLINIC | Age: 65
End: 2025-04-04
Payer: MEDICARE

## 2025-04-04 VITALS
TEMPERATURE: 96.8 F | RESPIRATION RATE: 16 BRPM | BODY MASS INDEX: 37.44 KG/M2 | HEART RATE: 84 BPM | SYSTOLIC BLOOD PRESSURE: 110 MMHG | WEIGHT: 247 LBS | DIASTOLIC BLOOD PRESSURE: 74 MMHG | HEIGHT: 68 IN | OXYGEN SATURATION: 96 %

## 2025-04-04 DIAGNOSIS — M32.19 SYSTEMIC LUPUS ERYTHEMATOSUS (SLE) WITH LEUKOPENIA (MULTI): ICD-10-CM

## 2025-04-04 DIAGNOSIS — Z53.20 STATIN DECLINED: ICD-10-CM

## 2025-04-04 DIAGNOSIS — I10 BENIGN HYPERTENSION: ICD-10-CM

## 2025-04-04 DIAGNOSIS — E11.42 DM TYPE 2 WITH DIABETIC PERIPHERAL NEUROPATHY: Primary | ICD-10-CM

## 2025-04-04 DIAGNOSIS — D72.819 SYSTEMIC LUPUS ERYTHEMATOSUS (SLE) WITH LEUKOPENIA (MULTI): ICD-10-CM

## 2025-04-04 PROCEDURE — 3008F BODY MASS INDEX DOCD: CPT | Performed by: FAMILY MEDICINE

## 2025-04-04 PROCEDURE — 3074F SYST BP LT 130 MM HG: CPT | Performed by: FAMILY MEDICINE

## 2025-04-04 PROCEDURE — 1036F TOBACCO NON-USER: CPT | Performed by: FAMILY MEDICINE

## 2025-04-04 PROCEDURE — 99213 OFFICE O/P EST LOW 20 MIN: CPT | Performed by: FAMILY MEDICINE

## 2025-04-04 PROCEDURE — 3078F DIAST BP <80 MM HG: CPT | Performed by: FAMILY MEDICINE

## 2025-04-04 NOTE — PROGRESS NOTES
"Subjective   Patient ID: Debra Marroquin is a 64 y.o. female who presents for Diabetes Mellitus (1 month follow up ).  HPI    Review of Systems    Objective   /74 (BP Location: Right arm, Patient Position: Sitting, BP Cuff Size: Large adult)   Pulse 84   Temp 36 °C (96.8 °F) (Temporal)   Resp 16   Ht 1.727 m (5' 8\")   Wt 112 kg (247 lb)   SpO2 96%   BMI 37.56 kg/m²           Physical Exam        Assessment/Plan   Problem List Items Addressed This Visit       DM type 2 with diabetic peripheral neuropathy    Relevant Orders    Albumin-Creatinine Ratio, Urine Random     " "confusion, decreased concentration, sleep disturbance and suicidal ideas.        Objective   /74 (BP Location: Right arm, Patient Position: Sitting, BP Cuff Size: Large adult)   Pulse 84   Temp 36 °C (96.8 °F) (Temporal)   Resp 16   Ht 1.727 m (5' 8\")   Wt 112 kg (247 lb)   SpO2 96%   BMI 37.56 kg/m²     ains abnormal data Anti-DNA Antibody, Double-Stranded  Order: [id number]   Status: Final result       Visible to patient: Yes (seen)       Dx: Systemic lupus erythematosus, unspeci...    0 Result Notes       1 Follow-up Encounter      Component  Ref Range & Units 3 wk ago   DNA (DS) ANTIBODY  IU/mL 5 High    Comment:                            IU/mL       Interpretation                             < or = 4    Negative                              Complement  Order: [id number]   Status: Final result       Visible to patient: Yes (seen)       Dx: Systemic lupus erythematosus, unspeci...    0 Result Notes       1 Follow-up Encounter      Component  Ref Range & Units 3 wk ago   COMPLEMENT COMPONENT C3C  83 - 193 mg/dL 121   Resulting        ntains abnormal data C4 Complement  Order: [id number]   Status: Final result       Visible to patient: Yes (seen)       Dx: Systemic lupus erythematosus, unspeci...    0 Result Notes       1 Follow-up Encounter      Component  Ref Range & Units 3 wk ago   COMPLEMENT COMPONENT C4C  15 - 57 mg/dL 11 Low    Resulting Agency         Visible to patient: Yes (seen)       Dx: Systemic lupus erythematosus, unspeci...    0 Result Notes       1 Follow-up Encounter      Component  Ref Range & Units 3 wk ago   GLUCOSE  65 - 99 mg/dL 151 High    Comment:               Fasting reference interval     For someone without known diabetes, a glucose  value >125 mg/dL indicates that they may have  diabetes and this should be confirmed with a  follow-up test.      UREA NITROGEN (BUN)  7 - 25 mg/dL 15   CREATININE  0.50 - 1.05 mg/dL 0.91   EGFR  > OR = 60 mL/min/1.73m2 71   SODIUM  135 - 146 " mmol/L 138   POTASSIUM  3.5 - 5.3 mmol/L 4.1   CHLORIDE  98 - 110 mmol/L 100   CARBON DIOXIDE  20 - 32 mmol/L 27   ELECTROLYTE BALANCE  7 - 17 mmol/L (calc) 11   CALCIUM  8.6 - 10.4 mg/dL 9.3   PROTEIN, TOTAL  6.1 - 8.1 g/dL 6.7   ALBUMIN  3.6 - 5.1 g/dL 4.1   BILIRUBIN, TOTAL  0.2 - 1.2 mg/dL 0.7   ALKALINE PHOSPHATASE  37 - 153 U/L 75   AST  10 - 35 U/L 26   ALT  6 - 29 U/L 19      Differential  Order: [id number]   Status: Final result       Visible to patient: Yes (seen)       Dx: Systemic lupus erythematosus, unspeci...    0 Result Notes       1 Follow-up Encounter      Component  Ref Range & Units 3 wk ago   WHITE BLOOD CELL COUNT  3.8 - 10.8 Thousand/uL 3.3 Low    RED BLOOD CELL COUNT  3.80 - 5.10 Million/uL 3.95   HEMOGLOBIN  11.7 - 15.5 g/dL 12.5   HEMATOCRIT  35.0 - 45.0 % 36.3   MCV  80.0 - 100.0 fL 91.9   MCH  27.0 - 33.0 pg 31.6   MCHC  32.0 - 36.0 g/dL 34.4   Comment: For adults, a slight decrease in the calculated MCHC  value (in the range of 30 to 32 g/dL) is most likely  not clinically significant; however, it should be  interpreted with caution in correlation with other  red cell parameters and the patient's clinical  condition.   RDW  11.0 - 15.0 % 14.2   PLATELET COUNT  140 - 400 Thousand/uL 94 Low    MPV  7.5 - 12.5 fL 10.8   ABSOLUTE NEUTROPHILS  1,500 - 7,800 cells/uL 2,208   ABSOLUTE LYMPHOCYTES  850 - 3,900 cells/uL 762 Low    ABSOLUTE MONOCYTES  200 - 950 cells/uL 241       Contains abnormal data Hemoglobin A1c  Order: [id number]   Status: Final result       Visible to patient: Yes (seen)       Dx: DM type 2 with diabetic peripheral ne...    2 Result Notes       1 HM Topic      Component  Ref Range & Units 1 mo ago   HEMOGLOBIN A1c  <5.7 % of total Hgb 5.7 High    Comment: For someone without known diabetes, a hemoglobin  A1c value between 5.7% and 6.4% is consistent with  prediabetes and should be confirmed with a  follow-up test.     For someone with known diabetes, a value <7%  indicates  that their diabetes is well controlled. A      LDL cholesterol, direct  Order: [id number]   Status: Final result       Visible to patient: Yes (seen)       Dx: DM type 2 with diabetic peripheral ne...    2 Result Notes       1  Topic      Component  Ref Range & Units 1 mo ago   DIRECT LDL  <100 mg/dL 78   Comment: Greatly elevated Triglycerides values (>1200 mg/dL)  interfere with the dLDL assay.     Desirable range <100 mg/dL for primary prevention;            Physical Exam  Vital signs reviewed and normal  General inspection reveals a pleasant interactive obese female in no acute distress  Neck neck is all without mass adenopathy bruits rigidity no JVD thyroid is normal  Chest chest is clear anteriorly and posteriorly  Cardiovascular soft grade 1/2 over 6 systolic ejection murmur but otherwise no diastolic murmurs gallop or ectopy  Abdominal no organomegaly mass or rebound no CVA tenderness  Peripheral vascular mildly reduced sensation from ankle down bilaterally with no pretibial edema today.  Skin no rash petechiae or jaundice muscular no new acute synovitis the upper and lower extremities      Assessment/Plan   Problem List Items Addressed This Visit       DM type 2 with diabetic peripheral neuropathy    Relevant Orders    Albumin-Creatinine Ratio, Urine Random   With lupus induced anemia leukopenia her and thrombocytopenia her platelet count and also white count is improved also her C3-C4 has been normalized as well as her anti-DNA antibody is improving dramatically over the last year  Follow-up with rheumatology and maintained Plaquenil as well as her every 6-month eye exam.  Continue follow-up with podiatry with her of foot stabilizer for the left ankle with greater Charcot involvement there  Continue to try to reduce her evening insulin since her A1c is good at 5.7 and maintain the same insulin in the morning  Maintain your Lasix in the morning with low-salt diet and low-fat diet  Continues declining  statin therapy aware the above  Recent lab see the above but otherwise stable sugar at 150 for her and good A1c  3 months we will check urine protein otherwise urine protein is good serum creatinine urine creatinine is slightly low but otherwise no significant elevation of albumin  @discharge  The above diagnosis and treatment plan was discussed with the patient patient will continue appropriate diet and exercise as reviewed  Patient will recheck earlier if any interval problems of significance or clinical worsening of the above problems.  Agrees above surveillance.  All question were addressed regarding above meds

## 2025-04-07 LAB
ALBUMIN/CREAT UR: ABNORMAL MG/G CREAT
CREAT UR-MCNC: 14 MG/DL (ref 20–275)
MICROALBUMIN UR-MCNC: <0.2 MG/DL

## 2025-04-08 PROBLEM — L97.512 ULCER OF TOE OF RIGHT FOOT, WITH FAT LAYER EXPOSED (MULTI): Status: RESOLVED | Noted: 2025-03-06 | Resolved: 2025-04-08

## 2025-04-08 ASSESSMENT — ENCOUNTER SYMPTOMS
SHORTNESS OF BREATH: 0
UNEXPECTED WEIGHT CHANGE: 0
FEVER: 0
BLOOD IN STOOL: 0
ARTHRALGIAS: 1
CONFUSION: 0
HEMATURIA: 0
VOMITING: 0
CHEST TIGHTNESS: 0
HEADACHES: 0
DECREASED CONCENTRATION: 0
MYALGIAS: 1
FLANK PAIN: 0
ABDOMINAL PAIN: 0
WHEEZING: 0
WEAKNESS: 1
PALPITATIONS: 0
SLEEP DISTURBANCE: 0
DYSURIA: 0
FATIGUE: 0
FREQUENCY: 1
LIGHT-HEADEDNESS: 0
COUGH: 0

## 2025-04-11 ENCOUNTER — APPOINTMENT (OUTPATIENT)
Dept: PRIMARY CARE | Facility: CLINIC | Age: 65
End: 2025-04-11
Payer: MEDICARE

## 2025-04-29 ENCOUNTER — APPOINTMENT (OUTPATIENT)
Dept: RADIOLOGY | Facility: HOSPITAL | Age: 65
End: 2025-04-29
Payer: MEDICARE

## 2025-04-29 ENCOUNTER — HOSPITAL ENCOUNTER (EMERGENCY)
Facility: HOSPITAL | Age: 65
Discharge: LEFT BEFORE DISCHARGE | End: 2025-04-30
Attending: EMERGENCY MEDICINE
Payer: MEDICARE

## 2025-04-29 ENCOUNTER — APPOINTMENT (OUTPATIENT)
Dept: CARDIOLOGY | Facility: HOSPITAL | Age: 65
End: 2025-04-29
Payer: MEDICARE

## 2025-04-29 DIAGNOSIS — G89.29 CHRONIC PAIN IN RIGHT SHOULDER: ICD-10-CM

## 2025-04-29 DIAGNOSIS — R07.9 ACUTE CHEST PAIN: Primary | ICD-10-CM

## 2025-04-29 DIAGNOSIS — Z53.29 LEFT AGAINST MEDICAL ADVICE: ICD-10-CM

## 2025-04-29 DIAGNOSIS — M25.511 CHRONIC PAIN IN RIGHT SHOULDER: ICD-10-CM

## 2025-04-29 LAB
ALBUMIN SERPL BCP-MCNC: 4.3 G/DL (ref 3.4–5)
ALP SERPL-CCNC: 87 U/L (ref 33–136)
ALT SERPL W P-5'-P-CCNC: 20 U/L (ref 7–45)
ANION GAP SERPL CALC-SCNC: 15 MMOL/L (ref 10–20)
AST SERPL W P-5'-P-CCNC: 32 U/L (ref 9–39)
BASOPHILS # BLD AUTO: 0.03 X10*3/UL (ref 0–0.1)
BASOPHILS NFR BLD AUTO: 0.5 %
BILIRUB SERPL-MCNC: 0.7 MG/DL (ref 0–1.2)
BNP SERPL-MCNC: 7 PG/ML (ref 0–99)
BUN SERPL-MCNC: 14 MG/DL (ref 6–23)
CALCIUM SERPL-MCNC: 9.3 MG/DL (ref 8.6–10.3)
CARDIAC TROPONIN I PNL SERPL HS: 4 NG/L (ref 0–13)
CHLORIDE SERPL-SCNC: 102 MMOL/L (ref 98–107)
CO2 SERPL-SCNC: 25 MMOL/L (ref 21–32)
CREAT SERPL-MCNC: 1.02 MG/DL (ref 0.5–1.05)
D DIMER PPP FEU-MCNC: 456 NG/ML FEU
EGFRCR SERPLBLD CKD-EPI 2021: 62 ML/MIN/1.73M*2
EOSINOPHIL # BLD AUTO: 0.08 X10*3/UL (ref 0–0.7)
EOSINOPHIL NFR BLD AUTO: 1.4 %
ERYTHROCYTE [DISTWIDTH] IN BLOOD BY AUTOMATED COUNT: 14.4 % (ref 11.5–14.5)
GLUCOSE SERPL-MCNC: 148 MG/DL (ref 74–99)
HCT VFR BLD AUTO: 37.3 % (ref 36–46)
HGB BLD-MCNC: 13.4 G/DL (ref 12–16)
IMM GRANULOCYTES # BLD AUTO: 0.02 X10*3/UL (ref 0–0.7)
IMM GRANULOCYTES NFR BLD AUTO: 0.4 % (ref 0–0.9)
LYMPHOCYTES # BLD AUTO: 0.84 X10*3/UL (ref 1.2–4.8)
LYMPHOCYTES NFR BLD AUTO: 14.9 %
MAGNESIUM SERPL-MCNC: 1.67 MG/DL (ref 1.6–2.4)
MCH RBC QN AUTO: 32.4 PG (ref 26–34)
MCHC RBC AUTO-ENTMCNC: 35.9 G/DL (ref 32–36)
MCV RBC AUTO: 90 FL (ref 80–100)
MONOCYTES # BLD AUTO: 0.43 X10*3/UL (ref 0.1–1)
MONOCYTES NFR BLD AUTO: 7.6 %
NEUTROPHILS # BLD AUTO: 4.23 X10*3/UL (ref 1.2–7.7)
NEUTROPHILS NFR BLD AUTO: 75.2 %
NRBC BLD-RTO: 0 /100 WBCS (ref 0–0)
PLATELET # BLD AUTO: 102 X10*3/UL (ref 150–450)
POTASSIUM SERPL-SCNC: 3.6 MMOL/L (ref 3.5–5.3)
PROT SERPL-MCNC: 7.3 G/DL (ref 6.4–8.2)
RBC # BLD AUTO: 4.13 X10*6/UL (ref 4–5.2)
SODIUM SERPL-SCNC: 138 MMOL/L (ref 136–145)
WBC # BLD AUTO: 5.6 X10*3/UL (ref 4.4–11.3)

## 2025-04-29 PROCEDURE — 85379 FIBRIN DEGRADATION QUANT: CPT | Performed by: PHYSICIAN ASSISTANT

## 2025-04-29 PROCEDURE — 83735 ASSAY OF MAGNESIUM: CPT | Performed by: PHYSICIAN ASSISTANT

## 2025-04-29 PROCEDURE — 73030 X-RAY EXAM OF SHOULDER: CPT | Mod: RT

## 2025-04-29 PROCEDURE — 99285 EMERGENCY DEPT VISIT HI MDM: CPT | Performed by: EMERGENCY MEDICINE

## 2025-04-29 PROCEDURE — 36415 COLL VENOUS BLD VENIPUNCTURE: CPT | Performed by: PHYSICIAN ASSISTANT

## 2025-04-29 PROCEDURE — 83880 ASSAY OF NATRIURETIC PEPTIDE: CPT | Performed by: PHYSICIAN ASSISTANT

## 2025-04-29 PROCEDURE — 80053 COMPREHEN METABOLIC PANEL: CPT | Performed by: PHYSICIAN ASSISTANT

## 2025-04-29 PROCEDURE — 93005 ELECTROCARDIOGRAM TRACING: CPT

## 2025-04-29 PROCEDURE — 85025 COMPLETE CBC W/AUTO DIFF WBC: CPT | Performed by: PHYSICIAN ASSISTANT

## 2025-04-29 PROCEDURE — 71045 X-RAY EXAM CHEST 1 VIEW: CPT

## 2025-04-29 PROCEDURE — 71045 X-RAY EXAM CHEST 1 VIEW: CPT | Performed by: STUDENT IN AN ORGANIZED HEALTH CARE EDUCATION/TRAINING PROGRAM

## 2025-04-29 PROCEDURE — 84484 ASSAY OF TROPONIN QUANT: CPT | Performed by: PHYSICIAN ASSISTANT

## 2025-04-29 RX ORDER — NITROGLYCERIN 0.4 MG/1
0.4 TABLET SUBLINGUAL EVERY 5 MIN PRN
Status: DISCONTINUED | OUTPATIENT
Start: 2025-04-29 | End: 2025-04-30 | Stop reason: HOSPADM

## 2025-04-29 RX ORDER — ONDANSETRON HYDROCHLORIDE 2 MG/ML
4 INJECTION, SOLUTION INTRAVENOUS ONCE
Status: DISCONTINUED | OUTPATIENT
Start: 2025-04-29 | End: 2025-04-30 | Stop reason: HOSPADM

## 2025-04-29 RX ORDER — NAPROXEN SODIUM 220 MG/1
324 TABLET, FILM COATED ORAL ONCE
Status: COMPLETED | OUTPATIENT
Start: 2025-04-29 | End: 2025-04-30

## 2025-04-29 RX ORDER — FENTANYL CITRATE 50 UG/ML
50 INJECTION, SOLUTION INTRAMUSCULAR; INTRAVENOUS ONCE
Status: DISCONTINUED | OUTPATIENT
Start: 2025-04-29 | End: 2025-04-30

## 2025-04-29 ASSESSMENT — PAIN - FUNCTIONAL ASSESSMENT: PAIN_FUNCTIONAL_ASSESSMENT: 0-10

## 2025-04-29 ASSESSMENT — COLUMBIA-SUICIDE SEVERITY RATING SCALE - C-SSRS
2. HAVE YOU ACTUALLY HAD ANY THOUGHTS OF KILLING YOURSELF?: NO
1. IN THE PAST MONTH, HAVE YOU WISHED YOU WERE DEAD OR WISHED YOU COULD GO TO SLEEP AND NOT WAKE UP?: NO
6. HAVE YOU EVER DONE ANYTHING, STARTED TO DO ANYTHING, OR PREPARED TO DO ANYTHING TO END YOUR LIFE?: NO

## 2025-04-29 ASSESSMENT — PAIN DESCRIPTION - DESCRIPTORS: DESCRIPTORS: HEAVINESS

## 2025-04-29 ASSESSMENT — PAIN DESCRIPTION - LOCATION: LOCATION: CHEST

## 2025-04-29 ASSESSMENT — PAIN SCALES - GENERAL: PAINLEVEL_OUTOF10: 8

## 2025-04-29 ASSESSMENT — PAIN DESCRIPTION - FREQUENCY: FREQUENCY: CONSTANT/CONTINUOUS

## 2025-04-30 ENCOUNTER — HOSPITAL ENCOUNTER (EMERGENCY)
Dept: CARDIOLOGY | Facility: HOSPITAL | Age: 65
Discharge: HOME | End: 2025-04-30
Payer: MEDICARE

## 2025-04-30 VITALS
BODY MASS INDEX: 37.03 KG/M2 | TEMPERATURE: 97.9 F | HEART RATE: 87 BPM | HEIGHT: 69 IN | OXYGEN SATURATION: 98 % | RESPIRATION RATE: 20 BRPM | DIASTOLIC BLOOD PRESSURE: 83 MMHG | SYSTOLIC BLOOD PRESSURE: 154 MMHG | WEIGHT: 250 LBS

## 2025-04-30 LAB
ATRIAL RATE: 80 BPM
CARDIAC TROPONIN I PNL SERPL HS: 4 NG/L (ref 0–13)
P AXIS: 44 DEGREES
P OFFSET: 200 MS
P ONSET: 151 MS
PR INTERVAL: 148 MS
Q ONSET: 225 MS
QRS COUNT: 13 BEATS
QRS DURATION: 86 MS
QT INTERVAL: 356 MS
QTC CALCULATION(BAZETT): 410 MS
QTC FREDERICIA: 392 MS
R AXIS: 58 DEGREES
T AXIS: 31 DEGREES
T OFFSET: 403 MS
VENTRICULAR RATE: 80 BPM

## 2025-04-30 PROCEDURE — 93005 ELECTROCARDIOGRAM TRACING: CPT

## 2025-04-30 PROCEDURE — 36415 COLL VENOUS BLD VENIPUNCTURE: CPT | Performed by: PHYSICIAN ASSISTANT

## 2025-04-30 PROCEDURE — 73030 X-RAY EXAM OF SHOULDER: CPT | Mod: RIGHT SIDE | Performed by: STUDENT IN AN ORGANIZED HEALTH CARE EDUCATION/TRAINING PROGRAM

## 2025-04-30 PROCEDURE — 2500000001 HC RX 250 WO HCPCS SELF ADMINISTERED DRUGS (ALT 637 FOR MEDICARE OP): Performed by: PHYSICIAN ASSISTANT

## 2025-04-30 PROCEDURE — 84484 ASSAY OF TROPONIN QUANT: CPT | Performed by: PHYSICIAN ASSISTANT

## 2025-04-30 PROCEDURE — 2500000004 HC RX 250 GENERAL PHARMACY W/ HCPCS (ALT 636 FOR OP/ED): Performed by: PHYSICIAN ASSISTANT

## 2025-04-30 RX ORDER — LIDOCAINE 50 MG/G
1 PATCH TOPICAL DAILY
Qty: 10 PATCH | Refills: 0 | Status: SHIPPED | OUTPATIENT
Start: 2025-04-30

## 2025-04-30 RX ORDER — ORPHENADRINE CITRATE 30 MG/ML
60 INJECTION INTRAMUSCULAR; INTRAVENOUS ONCE
Status: COMPLETED | OUTPATIENT
Start: 2025-04-30 | End: 2025-04-30

## 2025-04-30 RX ORDER — KETOROLAC TROMETHAMINE 30 MG/ML
15 INJECTION, SOLUTION INTRAMUSCULAR; INTRAVENOUS ONCE
Status: COMPLETED | OUTPATIENT
Start: 2025-04-30 | End: 2025-04-30

## 2025-04-30 RX ORDER — NAPROXEN 500 MG/1
500 TABLET ORAL
Qty: 30 TABLET | Refills: 0 | Status: SHIPPED | OUTPATIENT
Start: 2025-04-30 | End: 2025-05-15

## 2025-04-30 RX ORDER — METHOCARBAMOL 500 MG/1
500 TABLET, FILM COATED ORAL 3 TIMES DAILY
Qty: 21 TABLET | Refills: 0 | Status: SHIPPED | OUTPATIENT
Start: 2025-04-30 | End: 2025-05-07

## 2025-04-30 RX ADMIN — ASPIRIN 324 MG: 81 TABLET, CHEWABLE ORAL at 00:08

## 2025-04-30 RX ADMIN — KETOROLAC TROMETHAMINE 15 MG: 30 INJECTION, SOLUTION INTRAMUSCULAR at 01:28

## 2025-04-30 RX ADMIN — ORPHENADRINE CITRATE 60 MG: 60 INJECTION INTRAMUSCULAR; INTRAVENOUS at 01:27

## 2025-04-30 RX ADMIN — SODIUM CHLORIDE, SODIUM LACTATE, POTASSIUM CHLORIDE, AND CALCIUM CHLORIDE 1000 ML: .6; .31; .03; .02 INJECTION, SOLUTION INTRAVENOUS at 00:13

## 2025-04-30 ASSESSMENT — HEART SCORE
HEART SCORE: 4
HISTORY: MODERATELY SUSPICIOUS
TROPONIN: LESS THAN OR EQUAL TO NORMAL LIMIT
AGE: 45-64
ECG: NORMAL
RISK FACTORS: >2 RISK FACTORS OR HX OF ATHEROSCLEROTIC DISEASE

## 2025-04-30 ASSESSMENT — LIFESTYLE VARIABLES
EVER FELT BAD OR GUILTY ABOUT YOUR DRINKING: NO
HAVE PEOPLE ANNOYED YOU BY CRITICIZING YOUR DRINKING: NO
HAVE YOU EVER FELT YOU SHOULD CUT DOWN ON YOUR DRINKING: NO

## 2025-04-30 ASSESSMENT — PAIN SCALES - GENERAL: PAINLEVEL_OUTOF10: 5 - MODERATE PAIN

## 2025-04-30 ASSESSMENT — PAIN - FUNCTIONAL ASSESSMENT: PAIN_FUNCTIONAL_ASSESSMENT: 0-10

## 2025-04-30 NOTE — ED NOTES
Patient refused 2nd EKG, NTG , fentanyl and admission' patient signed out GILDA Reyes RN  04/30/25 0137

## 2025-04-30 NOTE — ED PROVIDER NOTES
HPI   Chief Complaint   Patient presents with    Chest Pain     Pt has had chest heaviness for 2 hours tonight. Pain radiates in right shoulder and ar thru to back (intermittent for 1 week). SOB on exertion. Pt a/ox4 from home       This is a 64-year-old female with a past medical history of hypertension, elevated BMI, chronic leukopenia, type 2 diabetes, impingement syndrome of the right shoulder, lupus presents emergency room with a chief complaint of chest tightness for the past 2 hours.  She states is midsternal and it feels like someone standing on her chest.  Nothing makes it worse or better, she took some Tylenol with no relief of symptoms.  She denies any history of cardiac disease, heart palpitations, cough or night sweats, denies any history of COPD CHF DVT or PE.  She also complains of right shoulder pain for the past week.  Patient states that the symptoms began after she was doing yard work, she thinks she may have overused her right shoulder.  She denies any history of falls or injuries.      History provided by:  Patient, medical records and spouse          Patient History   Medical History[1]  Surgical History[2]  Family History[3]  Social History[4]    Physical Exam   ED Triage Vitals [04/29/25 2223]   Temperature Heart Rate Respirations BP   36.6 °C (97.9 °F) 89 20 150/71      Pulse Ox Temp Source Heart Rate Source Patient Position   100 % Temporal Monitor --      BP Location FiO2 (%)     -- --       Physical Exam  Vitals and nursing note reviewed.   Constitutional:       General: She is not in acute distress.     Appearance: Normal appearance. She is obese.   HENT:      Head: Normocephalic and atraumatic.      Right Ear: Tympanic membrane, ear canal and external ear normal.      Left Ear: Tympanic membrane, ear canal and external ear normal.      Nose: Nose normal.      Mouth/Throat:      Mouth: Mucous membranes are moist.      Pharynx: Oropharynx is clear.   Eyes:      Extraocular Movements:  Extraocular movements intact.      Conjunctiva/sclera: Conjunctivae normal.      Pupils: Pupils are equal, round, and reactive to light.   Cardiovascular:      Rate and Rhythm: Normal rate and regular rhythm.      Pulses: Normal pulses.      Heart sounds: Normal heart sounds.   Pulmonary:      Effort: Pulmonary effort is normal.      Breath sounds: Normal breath sounds. No wheezing, rhonchi or rales.   Chest:      Chest wall: No tenderness.   Abdominal:      General: Bowel sounds are normal.      Palpations: Abdomen is soft. There is no mass.      Tenderness: There is no abdominal tenderness. There is no guarding.   Musculoskeletal:         General: Tenderness present. No swelling.      Right shoulder: Decreased range of motion.      Left shoulder: Normal.      Right upper arm: Normal.      Left upper arm: Normal.      Right elbow: Normal.      Left elbow: Normal.      Right forearm: Normal.      Left forearm: Normal.      Right wrist: Normal.      Left wrist: Normal.      Right hand: Normal.      Left hand: Normal.      Cervical back: Normal, normal range of motion and neck supple.      Thoracic back: Normal.      Lumbar back: Normal.   Skin:     General: Skin is warm and dry.      Capillary Refill: Capillary refill takes less than 2 seconds.      Findings: No rash.   Neurological:      General: No focal deficit present.      Mental Status: She is alert and oriented to person, place, and time. Mental status is at baseline.   Psychiatric:         Mood and Affect: Mood normal.         Behavior: Behavior normal.         Thought Content: Thought content normal.         Judgment: Judgment normal.           ED Course & MDM   Diagnoses as of 04/30/25 0123   Acute chest pain   Chronic pain in right shoulder   Left against medical advice                 No data recorded     Sari Coma Scale Score: 15 (04/29/25 2226 : Harika Whaley RN)                           Medical Decision Making  Temp 36.6, heart rate 89,  respirations 20, blood pressure 150/71, pulse ox is 100% on room air  EKG interpreted by me at 2230 was normal sinus rhythm rate 81 [place] was 168, QRS was 96,  QTc was 478 no STEMI  Patient was given a liter of LR, Zofran 4 mg IV push, fentanyl 50 mcg IV push, nitroglycerin sublingual 0.4 mg p.o., aspirin 324 mg p.o.  Patient's lab work was reviewed.  CBC shows a white count of 5.6, hemoglobin 13.4, hematocrit 37.3, 38 otherwise 67, BNP is 7, D-dimer was 56.  Chest x-ray was unremarkable for any acute cardiopulmonary process.  Repeat blood pressure was 128/59 heart rate 83 respirations 19 pulse ox is 97% on room air  Patient's heart score is a 4.  Patient refused the nitroglycerin, fentanyl.  I did offer to do Toradol and Robaxin for her shoulder pain as well as admitted for chest pain and shoulder pain.  After talking to patient and answering questions she does not want to stay.  Patient states she would prefer to go home and follow-up as an outpatient.  When asked the patient why she did not want to stay for workup the patient states that she does not want to stay she would not give any other answer.  I did explain to the patient and her  the reasoning for the reasoning for my recommendation for admission.  After answering all the questions including explaining the patient's heart score and risk factors the patient verbalizes understanding of my concerns and still wishes to leave AGAINST MEDICAL ADVICE.  The patient appears to have decision-making capacity and lyses understanding of her risks.  Family member who is present verbalizes agreement and support of the patient's decision.  We will establish follow-up with cardiology and her primary care as well as with orthopedics.  I will be prescribing naproxen, Robaxin and lidocaine patches for her shoulder pain, I encouraged her to return back to the ER should she change her mind have any concerns or worsening of any symptoms.  Patient verbalizes  understanding agreement with the plan and disposition.        Shared PARAS Attestation:    I personally saw the patient and made/approved the management plan and take responsibility for the patient management.    History: Patient presenting with chest pain.    Exam: Regular rate and rhythm cardiac exam with clear breath sounds bilaterally.  Abdomen is soft and nontender.  Neurological exam is grossly intact.  There is mild tenderness to palpation to the right shoulder.  Negative Homans' sign bilaterally.    MDM:     Differential Diagnosis: ACS, arrhythmia, PE, pneumonia, muscle strain    Labs Reviewed   CBC WITH AUTO DIFFERENTIAL - Abnormal       Result Value    WBC 5.6      nRBC 0.0      RBC 4.13      Hemoglobin 13.4      Hematocrit 37.3      MCV 90      MCH 32.4      MCHC 35.9      RDW 14.4      Platelets 102 (*)     Neutrophils % 75.2      Immature Granulocytes %, Automated 0.4      Lymphocytes % 14.9      Monocytes % 7.6      Eosinophils % 1.4      Basophils % 0.5      Neutrophils Absolute 4.23      Immature Granulocytes Absolute, Automated 0.02      Lymphocytes Absolute 0.84 (*)     Monocytes Absolute 0.43      Eosinophils Absolute 0.08      Basophils Absolute 0.03     COMPREHENSIVE METABOLIC PANEL - Abnormal    Glucose 148 (*)     Sodium 138      Potassium 3.6      Chloride 102      Bicarbonate 25      Anion Gap 15      Urea Nitrogen 14      Creatinine 1.02      eGFR 62      Calcium 9.3      Albumin 4.3      Alkaline Phosphatase 87      Total Protein 7.3      AST 32      Bilirubin, Total 0.7      ALT 20     MAGNESIUM - Normal    Magnesium 1.67     B-TYPE NATRIURETIC PEPTIDE - Normal    BNP 7      Narrative:        <100 pg/mL - Heart failure unlikely  100-299 pg/mL - Intermediate probability of acute heart                  failure exacerbation. Correlate with clinical                  context and patient history.    >=300 pg/mL - Heart Failure likely. Correlate with clinical                  context and patient  history.    BNP testing is performed using different testing methodology at [facility] than at other system hospitals. Direct result comparisons should only be made within the same method.      D-DIMER, VTE EXCLUSION - Normal    D-Dimer, Quantitative VTE Exclusion 456      Narrative:     The VTE Exclusion D-Dimer assay is reported in ng/mL Fibrinogen Equivalent Units (FEU).    Per 's instructions for use, a value of less than 500 ng/mL (FEU) may help to exclude DVT or PE in outpatients when the assay is used with a clinical pretest probability assessment.(AE must utilize and document eCalc 'Wells Score Deep Vein Thrombosis Risk' for DVT exclusion only. Emergency Department should utilize  Guidelines for Emergency Department Use of the VTE Exclusion D-Dimer and Clinical Pretest probability assessment model for DVT or PE exclusion.)   SERIAL TROPONIN-INITIAL - Normal    Troponin I, High Sensitivity 4      Narrative:     Less than 99th percentile of normal range cutoff-  Female and children under 18 years old <14 ng/L; Male <21 ng/L: Negative  Repeat testing should be performed if clinically indicated.     Female and children under 18 years old 14-50 ng/L; Male 21-50 ng/L:  Consistent with possible cardiac damage and possible increased clinical   risk. Serial measurements may help to assess extent of myocardial damage.     >50 ng/L: Consistent with cardiac damage, increased clinical risk and  myocardial infarction. Serial measurements may help assess extent of   myocardial damage.      NOTE: Children less than 1 year old may have higher baseline troponin   levels and results should be interpreted in conjunction with the overall   clinical context.     NOTE: Troponin I testing is performed using a different   testing methodology at [facility] than at other   [facility]. Direct result comparisons should only   be made within the same method.   TROPONIN SERIES- (INITIAL, 1  HR)    Narrative:     The following orders were created for panel order Troponin I Series, High Sensitivity (0, 1 HR).  Procedure                               Abnormality         Status                     ---------                               -----------         ------                     Troponin I, High Sensiti...[[id number]]  Normal              Final result               Troponin, High Sensitivi...[[id number]]                                                   Please view results for these tests on the individual orders.   SERIAL TROPONIN, 1 HOUR       XR chest 1 view    (Results Pending)           Te Pederson MD      Procedure  Procedures         [1]   Past Medical History:  Diagnosis Date    Chronic sinusitis, unspecified 10/05/2020    Sinobronchitis    Personal history of other specified conditions 12/04/2019    History of chronic cough    Ulcer of toe of right foot, with fat layer exposed ([facility]) 03/06/2025    Unspecified asthma with (acute) exacerbation ([facility]) 12/04/2019    Exacerbation of RAD (reactive airway disease)   [2]   Past Surgical History:  Procedure Laterality Date    OTHER SURGICAL HISTORY  09/04/2019    Tonsillectomy    OTHER SURGICAL HISTORY  09/04/2019    Hemorrhoidectomy    OTHER SURGICAL HISTORY  09/04/2019    Tubal ligation    OTHER SURGICAL HISTORY  09/04/2019    Ganglion cyst excision    OTHER SURGICAL HISTORY  09/04/2019    Carpal tunnel surgery    OTHER SURGICAL HISTORY  09/04/2019    Cholecystectomy    OTHER SURGICAL HISTORY  09/04/2019    Foot surgery   [3]   Family History  Problem Relation Name Age of Onset    Atrial fibrillation Mother      Heart attack Father      Pneumonia Sister      Other (meningitis) Brother      Heart attack Brother      Thyroid disease Brother     [4]   Social History  Tobacco Use    Smoking status: Former     Current packs/day: 0.00     Average packs/day: 2.0 packs/day for 20.0 years (40.0 ttl pk-yrs)     Types: Cigarettes     Start date: 1981      Quit date:      Years since quittin.3     Passive exposure: Past    Smokeless tobacco: Never   Substance Use Topics    Alcohol use: Not Currently    Drug use: Never        Deniz Jaeger PA-C  25 0123

## 2025-05-01 LAB
ATRIAL RATE: 80 BPM
P AXIS: 44 DEGREES
P OFFSET: 200 MS
P ONSET: 151 MS
PR INTERVAL: 148 MS
Q ONSET: 225 MS
QRS COUNT: 13 BEATS
QRS DURATION: 86 MS
QT INTERVAL: 356 MS
QTC CALCULATION(BAZETT): 410 MS
QTC FREDERICIA: 392 MS
R AXIS: 58 DEGREES
T AXIS: 31 DEGREES
T OFFSET: 403 MS
VENTRICULAR RATE: 80 BPM

## 2025-06-10 DIAGNOSIS — Z12.31 ENCOUNTER FOR SCREENING MAMMOGRAM FOR BREAST CANCER: ICD-10-CM

## 2025-07-07 ENCOUNTER — APPOINTMENT (OUTPATIENT)
Dept: PRIMARY CARE | Facility: CLINIC | Age: 65
End: 2025-07-07
Payer: MEDICARE

## 2025-07-09 ENCOUNTER — APPOINTMENT (OUTPATIENT)
Dept: PRIMARY CARE | Facility: CLINIC | Age: 65
End: 2025-07-09
Payer: MEDICARE

## 2025-07-09 VITALS
SYSTOLIC BLOOD PRESSURE: 128 MMHG | RESPIRATION RATE: 16 BRPM | OXYGEN SATURATION: 98 % | WEIGHT: 251 LBS | DIASTOLIC BLOOD PRESSURE: 82 MMHG | BODY MASS INDEX: 37.18 KG/M2 | TEMPERATURE: 96.8 F | HEIGHT: 69 IN | HEART RATE: 82 BPM

## 2025-07-09 DIAGNOSIS — M14.679 CHARCOT'S JOINT, UNSPECIFIED ANKLE AND FOOT: ICD-10-CM

## 2025-07-09 DIAGNOSIS — I10 BENIGN HYPERTENSION: ICD-10-CM

## 2025-07-09 DIAGNOSIS — E11.42 DM TYPE 2 WITH DIABETIC PERIPHERAL NEUROPATHY: ICD-10-CM

## 2025-07-09 DIAGNOSIS — D72.819 SYSTEMIC LUPUS ERYTHEMATOSUS (SLE) WITH LEUKOPENIA (MULTI): ICD-10-CM

## 2025-07-09 DIAGNOSIS — Z00.00 ROUTINE GENERAL MEDICAL EXAMINATION AT HEALTH CARE FACILITY: ICD-10-CM

## 2025-07-09 DIAGNOSIS — M32.19 SYSTEMIC LUPUS ERYTHEMATOSUS (SLE) WITH LEUKOPENIA (MULTI): ICD-10-CM

## 2025-07-09 DIAGNOSIS — Z00.00 MEDICARE ANNUAL WELLNESS VISIT, SUBSEQUENT: Primary | ICD-10-CM

## 2025-07-09 DIAGNOSIS — L08.9 SKIN PUSTULE: ICD-10-CM

## 2025-07-09 PROCEDURE — 1036F TOBACCO NON-USER: CPT | Performed by: FAMILY MEDICINE

## 2025-07-09 PROCEDURE — G0439 PPPS, SUBSEQ VISIT: HCPCS | Performed by: FAMILY MEDICINE

## 2025-07-09 PROCEDURE — 3079F DIAST BP 80-89 MM HG: CPT | Performed by: FAMILY MEDICINE

## 2025-07-09 PROCEDURE — 99212 OFFICE O/P EST SF 10 MIN: CPT | Performed by: FAMILY MEDICINE

## 2025-07-09 PROCEDURE — 3008F BODY MASS INDEX DOCD: CPT | Performed by: FAMILY MEDICINE

## 2025-07-09 PROCEDURE — 3074F SYST BP LT 130 MM HG: CPT | Performed by: FAMILY MEDICINE

## 2025-07-09 RX ORDER — INSULIN LISPRO 100 [IU]/ML
INJECTION, SUSPENSION SUBCUTANEOUS
Qty: 15 ML | Refills: 3 | Status: SHIPPED | OUTPATIENT
Start: 2025-07-09

## 2025-07-09 RX ORDER — FUROSEMIDE 20 MG/1
20 TABLET ORAL DAILY
Qty: 90 TABLET | Refills: 3 | Status: SHIPPED | OUTPATIENT
Start: 2025-07-09

## 2025-07-09 RX ORDER — SULFAMETHOXAZOLE AND TRIMETHOPRIM 800; 160 MG/1; MG/1
1 TABLET ORAL 2 TIMES DAILY
Qty: 20 TABLET | Refills: 0 | Status: SHIPPED | OUTPATIENT
Start: 2025-07-09 | End: 2025-07-19

## 2025-07-09 RX ORDER — SULFAMETHOXAZOLE AND TRIMETHOPRIM 800; 160 MG/1; MG/1
1 TABLET ORAL 2 TIMES DAILY
Qty: 20 TABLET | Refills: 0 | Status: SHIPPED | OUTPATIENT
Start: 2025-07-09 | End: 2025-07-09 | Stop reason: SDUPTHER

## 2025-07-09 ASSESSMENT — ACTIVITIES OF DAILY LIVING (ADL)
TAKING_MEDICATION: INDEPENDENT
BATHING: INDEPENDENT
DRESSING: INDEPENDENT
GROCERY_SHOPPING: INDEPENDENT
DOING_HOUSEWORK: INDEPENDENT
MANAGING_FINANCES: INDEPENDENT

## 2025-07-09 ASSESSMENT — ENCOUNTER SYMPTOMS
OCCASIONAL FEELINGS OF UNSTEADINESS: 0
LOSS OF SENSATION IN FEET: 0
DEPRESSION: 0

## 2025-07-09 NOTE — ASSESSMENT & PLAN NOTE
Orders:    insulin lispro protamin-lispro (HumaLOG Mix 75-25) 100 unit/mL (75-25) pen; INJECT 44 UNITS SUBCUTANEOUSLY TWICE DAILY    Hemoglobin A1c; Future    Comprehensive metabolic panel; Future    Follow Up In [facility] - PCP - Established; Future

## 2025-07-09 NOTE — PROGRESS NOTES
Subjective   Reason for Visit: Debra Marroquin is an 65 y.o. female here for a Medicare Wellness visit.          Reviewed all medications by prescribing practitioner or clinical pharmacist (such as prescriptions, OTCs, herbal therapies and supplements) and documented in the medical record.    HPI  Patient is here for Medicare annual well you.  Patient history of lupus followed by rheumatology and gratefully her white count of the 5000 the best it has been in about 3 to 4 years does take her Plaquenil twice a day.  Gets an every 6-month eye checks and CBC liver function been stable with history of diabetes takes 38 to 40 units of 7525 for meals.  Otherwise morning still runs the highest so we will stick about 3538 the morning and maybe 45 before supper.  Last A1c was normal  Urine albumin was also normal  Denies any chest pain short of the palpitations no new GI/ issues.  No change SBE and I did convince her to get a mammogram.  After review declines colon cancer screening in the form of scoping and stool sampling notify me if change in color or frequency  Of Charcot joints and does use her splint to her left ankle and foot no increasing edema no increasing pretibial edema.  No new rashes  Therapy tries to watch her low-fat diet with history of dyslipidemia  Takes 20 mg of Lasix for peripheral swelling as well as hypertension.  Counter B12 as well as iron and MND and vitamin C.  Uses her albuterol.  Review Zec[employer] her foot exam chronic meds reviewed no reported side effects  Patient Care Team:  Michael Burnette DO as PCP - General  Michael Burnette DO as PCP - [employer] PCP     Review of Systems   Constitutional:  Negative for fatigue, fever and unexpected weight change.   Eyes:  Negative for visual disturbance.   Respiratory:  Negative for cough, chest tightness and shortness of breath.    Cardiovascular:  Negative for chest pain, palpitations and leg swelling.   Gastrointestinal:  Negative for abdominal  "pain, blood in stool, constipation, nausea, rectal pain and vomiting.   Genitourinary:  Positive for frequency. Negative for dysuria and hematuria.   Musculoskeletal:  Positive for arthralgias, back pain, gait problem and joint swelling. Negative for myalgias.   Skin:  Positive for rash (Pustule with surrounding redness of the left fourth digit otherwise no petechiae urticaria).   Neurological:  Positive for numbness. Negative for syncope, weakness, light-headedness and headaches.   Psychiatric/Behavioral:  Negative for behavioral problems, confusion, decreased concentration, sleep disturbance and suicidal ideas.        Objective   Vitals:  /82 (BP Location: Right arm, Patient Position: Sitting, BP Cuff Size: Large adult)   Pulse 82   Temp 36 °C (96.8 °F) (Temporal)   Resp 16   Ht 1.753 m (5' 9\")   Wt 114 kg (251 lb)   SpO2 98%   BMI 37.07 kg/m²       Physical Exam  Reviewed and are normal.  Neck neck is supple no mass adenopathy bruits rigidity  Cardiovascular RSR without murmur gallop or ectopy-no new peripheral edema  Chest chest is clear  Skin the medial aspect of the middle phalanx the left fourth finger shows a very small pustule is able to cleanse with a sterile blade with mild debris and then cover with dry sterile dressing and bacitracin   Contains abnormal data Comprehensive Metabolic Panel  Order: [id number]   Status: Final result    Test Result Released: Yes (seen)    0 Result Notes            Component  Ref Range & Units 2 mo ago  (4/29/25) 11 mo ago  (7/23/24) 1 yr ago  (6/4/24) 1 yr ago  (2/9/24) 1 yr ago  (10/25/23) 2 yr ago  (7/3/23) 2 yr ago  (3/14/23)   Glucose  74 - 99 mg/dL 148 High  141 High  135 High  131 High  131 High  158 High  181 High    Sodium  136 - 145 mmol/L 138 139 136 140 140 138 138   Potassium  3.5 - 5.3 mmol/L 3.6 4.0 3.9 4.1 4.2 3.9 4.0   Chloride  98 - 107 mmol/L 102 103 102 101 103 101 99   Bicarbonate  21 - 32 mmol/L 25 28 27 29 30 26 31   Anion Gap  10 - 20 " mmol/L 15 12 11 14 11 15 12   Urea Nitrogen  6 - 23 mg/dL 14 15 12 13 11 15 13   Creatinine  0.50 - 1.05 mg/dL 1.02 0.93 0.91 0.94 0.94 1.01 1.00   eGFR  >60 mL/min/1.73m*2 62 69 CM 71 CM 69 CM 69 CM     Comment: Calculations of estimated GFR are performed using the 2021 CKD-EPI Study Refit equation without the race variable for the IDMS-Traceable creatinine methods.  https://jasn.asnjournals.org/content/early/2021/09/22/ASN.6485702704   Calcium  8.6 - 10.3 mg/dL 9.3 9.0 9.4 9.1 9.2 9.5 9.7   Albumin  3.4 - 5.0 g/dL 4.3  4.3 4.1  4.4 4.3   Alkaline Phosphatase  33 - 136 U/L 87  74 76  83 66   Total Protein  6.4 - 8.2 g/dL 7.3  7.2 7.0  7.6 7.9   AST  9 - 39 U/L 32  29 28  30 30   Bilirubin, Total  0.0 - 1.2 mg/dL 0.7  1.2 0.9  0.9 1.1   ALT  7 - 45 U/L 20             Contains abnormal data CBC and Auto Differential  Order: [id number]   Status: Final result    Test Result Released: Yes (seen)    0 Result Notes            Component  Ref Range & Units 2 mo ago  (4/29/25) 1 yr ago  (6/4/24) 1 yr ago  (2/9/24) 1 yr ago  (10/25/23) 2 yr ago  (3/14/23) 2 yr ago  (11/21/22) 2 yr ago  (8/16/22)   WBC  4.4 - 11.3 x10*3/uL 5.6 3.8 Low  3.8 Low  2.8 Low  3.7 Low  R 3.3 Low  R 2.4 Low  R   nRBC  0.0 - 0.0 /100 WBCs 0.0 0.0 0.0 0.0      RBC  4.00 - 5.20 x10*6/uL 4.13 4.05 4.19 3.88 Low  4.28 R 4.04 R 3.93 Low  R   Hemoglobin  12.0 - 16.0 g/dL 13.4 13.0 13.3 12.2 13.2 12.4 11.6 Low    Hematocrit  36.0 - 46.0 % 37.3 37.1 39.4 36.9 39.0 38.3 36.8   MCV  80 - 100 fL 90 92 94 95 91 95 94   MCH  26.0 - 34.0 pg 32.4 32.1 31.7 31.4      MCHC  32.0 - 36.0 g/dL 35.9 35.0 33.8 33.1 33.8 32.4 31.5 Low    RDW  11.5 - 14.5 % 14.4 14.4 14.8 High  15.1 High  14.6 High  14.7 High  14.4   Platelets  150 - 450 x10*3/uL 102 Low  112 Low  110 Low  91 Low  CM 93 Low  R 95 Low  R 71 Low  R   Neutrophils %  40.0 - 80.0 % 75.2 64.5 65.8  70.5 54.8 66.0   Immature Granulocytes %, Automated  0.0 - 0.9 % 0.4 0.5 CM 0.3 CM  0.3 CM 0.3 CM 0.4 CM   Comment:  Immature Granulocyte Count (IG) includes promyelocytes, myelocytes and metamyelocytes but does not include bands. Percent differential counts (%) should be interpreted in the context of the absolute cell counts (cells/UL).   Lymphocytes %  13.0 - 44.0 % 14.9 25.8 24.3  20.1 34.6 21.3   Monocytes %  2.0 - 10.0 % 7.6 6.6 7.0  6.7 7.6 9.0   Eosinophils %  0.0 - 6.0 % 1.4 1.8 2.1  1.6 2.1 2.5   Basophils %  0.0 - 2.0 % 0.5 0.8 0.5  0.8 0.6 0.8   Neutrophils Absolute  1.20 - 7.70 x10*3/uL 4.23 2.45 CM 2.52 CM  2.64 R 1.79 R 1.61 R   Comment: Percent differential counts (%) should be interpreted in the context of the absolute cell counts (cells/uL).   Immature Granulocytes Absolute, Automated  0.00 - 0.70 x10*3/uL 0.02 0.02 0.01       Lymphocytes Absolute  1.20 - 4.80 x10*3/uL 0.84 Low  0.98 Low  0.93 Low   0.75 Low  R 1.13 Low  R 0.52 Low  R   Monocytes Absolute  0.10 - 1.00 x10*3/uL 0.4            Contains abnormal data Albumin-Creatinine Ratio, Urine Random  Order: [id number]   Status: Final result       Dx: DM type 2 with diabetic peripheral ne...    Test Result Released: Yes (seen)    2 Result Notes       1 HM Topic      Component  Ref Range & Units 3 mo ago   CREATININE, RANDOM URINE  20 - 275 mg/dL 14 Low    ALBUMIN, URINE  See Note: mg/dL <0.2   Comment: Reference Range:    Reference Range  Not established   ALBUMIN/CREATININE RATIO, RANDOM URINE  <30 mg/g creat N      Anti-DNA Antibody, Double-Stranded  Order: [id number]   Status: Final result       Dx: Systemic lupus erythematosus, unspeci...    Test Result Released: Yes (seen)    0 Result Notes       View Follow-Up Encounter      Component  Ref Range & Units 4 mo ago   DNA (DS) ANTIBODY  IU/mL 5 High    Comment:                            IU/mL       Interpretation                             < or = 4    Negative                             5-9         Indeterminate                  Hemoglobin A1c  Order: [id number]   Status: Final result       Dx: DM type 2 with  diabetic peripheral ne...    Test Result Released: Yes (seen)    2 Result Notes       1  Topic      Component  Ref Range & Units 4 mo ago   HEMOGLOBIN A1c  <5.7 % of total Hgb 5.7 High    Comment: For someone without known diabetes, a hemoglobin  A1c value between 5.7% and 6.4% is consistent with  prediabetes and should be confirmed with a  follow-up test.     For someone with known diabetes, a value <7%  indicates that their di      LDL cholesterol, direct  Order: [id number]   Status: Final result       Dx: DM type 2 with diabetic peripheral ne...    Test Result Released: Yes (seen)    2 Result Notes       1  Topic      Component  Ref Range & Units 4 mo ago   DIRECT LDL  <100 mg/dL 78   Comment: Greatly elevated Triglycerides values (>1200 mg/dL)  interfere with the dLDL assay.     Desirable range <        Assessment & Plan  Benign hypertension    Orders:    furosemide (Lasix) 20 mg tablet; Take 1 tablet (20 mg) by mouth once daily.    DM type 2 with diabetic peripheral neuropathy    Orders:    insulin lispro protamin-lispro (HumaLOG Mix 75-25) 100 unit/mL (75-25) pen; INJECT 44 UNITS SUBCUTANEOUSLY TWICE DAILY    Hemoglobin A1c; Future    Comprehensive metabolic panel; Future    Follow Up In [facility] - PCP - Established; Future    Skin pustule    Orders:    sulfamethoxazole-trimethoprim (Bactrim DS) 800-160 mg tablet; Take 1 tablet by mouth 2 times a day for 10 days.    Medicare annual wellness visit, subsequent         Charcot's joint, unspecified ankle and foot         Systemic lupus erythematosus (SLE) with leukopenia (Multi)         Routine general medical examination at health care facility    Orders:    1 Year Follow Up In [facility] - PCP - Wellness Exam; Future            Lab x-ray reviewed  Diabetic goals diabetic diet diabetic meds reviewed get up for evening 75/25 insulin  Will place on Bactrim DS for 1 week because of the infected pustule left hand and the lower worsening  notify me  Recheck in 3 months with previsit CMP and A1c ---happy her white count is stabilized /her platelet count is improved  Follow-up with rheumatology on a regular basis  Diet tray because of her Charcot disease  deClines: all CA/colon screening after review but agrees to mammography which was ordered  @discharge  The above diagnosis and treatment plan was discussed with the patient patient will continue appropriate diet and exercise as reviewed  Patient will recheck earlier if any interval problems of significance or clinical worsening of the above problems.  Agrees above surveillance.  All question were addressed regarding above meds

## 2025-07-13 PROBLEM — L08.9 SKIN PUSTULE: Status: ACTIVE | Noted: 2025-07-13

## 2025-07-13 ASSESSMENT — ENCOUNTER SYMPTOMS
CONFUSION: 0
COUGH: 0
JOINT SWELLING: 1
DYSURIA: 0
FATIGUE: 0
RECTAL PAIN: 0
DECREASED CONCENTRATION: 0
MYALGIAS: 0
BACK PAIN: 1
HEADACHES: 0
HEMATURIA: 0
ARTHRALGIAS: 1
NAUSEA: 0
LIGHT-HEADEDNESS: 0
WEAKNESS: 0
PALPITATIONS: 0
ABDOMINAL PAIN: 0
CONSTIPATION: 0
SLEEP DISTURBANCE: 0
NUMBNESS: 1
BLOOD IN STOOL: 0
CHEST TIGHTNESS: 0
UNEXPECTED WEIGHT CHANGE: 0
FREQUENCY: 1
VOMITING: 0
SHORTNESS OF BREATH: 0
FEVER: 0

## 2025-07-13 NOTE — ASSESSMENT & PLAN NOTE
Orders:    sulfamethoxazole-trimethoprim (Bactrim DS) 800-160 mg tablet; Take 1 tablet by mouth 2 times a day for 10 days.

## 2025-09-03 LAB
ALBUMIN SERPL-MCNC: 4.2 G/DL (ref 3.6–5.1)
ALP SERPL-CCNC: 77 U/L (ref 37–153)
ALT SERPL-CCNC: 16 U/L (ref 6–29)
ANION GAP SERPL CALCULATED.4IONS-SCNC: 10 MMOL/L (CALC) (ref 7–17)
AST SERPL-CCNC: 27 U/L (ref 10–35)
BILIRUB SERPL-MCNC: 0.6 MG/DL (ref 0.2–1.2)
BUN SERPL-MCNC: 17 MG/DL (ref 7–25)
CALCIUM SERPL-MCNC: 8.8 MG/DL (ref 8.6–10.4)
CHLORIDE SERPL-SCNC: 102 MMOL/L (ref 98–110)
CO2 SERPL-SCNC: 26 MMOL/L (ref 20–32)
CREAT SERPL-MCNC: 0.89 MG/DL (ref 0.5–1.05)
EGFRCR SERPLBLD CKD-EPI 2021: 72 ML/MIN/1.73M2
EST. AVERAGE GLUCOSE BLD GHB EST-MCNC: 140 MG/DL
EST. AVERAGE GLUCOSE BLD GHB EST-SCNC: 7.7 MMOL/L
GLUCOSE SERPL-MCNC: 151 MG/DL (ref 65–99)
HBA1C MFR BLD: 6.5 %
POTASSIUM SERPL-SCNC: 4 MMOL/L (ref 3.5–5.3)
PROT SERPL-MCNC: 7.3 G/DL (ref 6.1–8.1)
SODIUM SERPL-SCNC: 138 MMOL/L (ref 135–146)

## 2025-09-08 ENCOUNTER — APPOINTMENT (OUTPATIENT)
Dept: PRIMARY CARE | Facility: CLINIC | Age: 65
End: 2025-09-08
Payer: MEDICARE

## 2025-12-16 ENCOUNTER — APPOINTMENT (OUTPATIENT)
Dept: RHEUMATOLOGY | Facility: CLINIC | Age: 65
End: 2025-12-16
Payer: MEDICARE